# Patient Record
Sex: FEMALE | Race: WHITE | HISPANIC OR LATINO | Employment: FULL TIME | URBAN - METROPOLITAN AREA
[De-identification: names, ages, dates, MRNs, and addresses within clinical notes are randomized per-mention and may not be internally consistent; named-entity substitution may affect disease eponyms.]

---

## 2017-02-05 ENCOUNTER — OFFICE VISIT (OUTPATIENT)
Dept: URGENT CARE | Facility: PHYSICIAN GROUP | Age: 57
End: 2017-02-05
Payer: COMMERCIAL

## 2017-02-05 VITALS
DIASTOLIC BLOOD PRESSURE: 78 MMHG | TEMPERATURE: 99.1 F | BODY MASS INDEX: 31.41 KG/M2 | SYSTOLIC BLOOD PRESSURE: 122 MMHG | WEIGHT: 160 LBS | HEART RATE: 90 BPM | HEIGHT: 60 IN | OXYGEN SATURATION: 95 % | RESPIRATION RATE: 18 BRPM

## 2017-02-05 DIAGNOSIS — J20.9 ACUTE BRONCHITIS WITH BRONCHOSPASM: ICD-10-CM

## 2017-02-05 PROCEDURE — 99214 OFFICE O/P EST MOD 30 MIN: CPT | Performed by: NURSE PRACTITIONER

## 2017-02-05 RX ORDER — METHYLPREDNISOLONE 4 MG/1
4 TABLET ORAL DAILY
Qty: 1 KIT | Refills: 0 | Status: SHIPPED | OUTPATIENT
Start: 2017-02-05 | End: 2019-05-10

## 2017-02-05 RX ORDER — ALBUTEROL SULFATE 90 UG/1
2 AEROSOL, METERED RESPIRATORY (INHALATION) EVERY 6 HOURS PRN
Qty: 8.5 G | Refills: 0 | Status: SHIPPED | OUTPATIENT
Start: 2017-02-05 | End: 2021-08-05

## 2017-02-05 RX ORDER — AZITHROMYCIN 250 MG/1
TABLET, FILM COATED ORAL
Qty: 6 TAB | Refills: 0 | Status: SHIPPED | OUTPATIENT
Start: 2017-02-05 | End: 2019-05-10

## 2017-02-05 RX ORDER — CODEINE PHOSPHATE AND GUAIFENESIN 10; 100 MG/5ML; MG/5ML
5 SOLUTION ORAL EVERY 6 HOURS PRN
Qty: 120 ML | Refills: 0 | Status: SHIPPED | OUTPATIENT
Start: 2017-02-05 | End: 2019-05-10

## 2017-02-05 NOTE — MR AVS SNAPSHOT
Lauren Mandel   2017 2:35 PM   Office Visit   MRN: [id number]    Department:  [facility]   Dept Phone:  [phone]    Description:  Female : 1960   Provider:  STAN Mallory           Reason for Visit     Cough had cough, fever, sore [id number] ago, now just has cough, wheezing, SOB, chest congestion and slight fever remaining      Allergies as of 2017     Allergen Noted Reactions    Pcn [Penicillins] 2010       Sulfa Drugs 2010         You were diagnosed with     Acute bronchitis with bronchospasm   [[id number]]         Vital Signs     Blood Pressure Pulse Temperature Respirations Height Weight    122/78 mmHg 90 37.3 °C (99.1 °F) 18 1.524 m (5') 72.576 kg (160 lb)    Body Mass Index Oxygen Saturation                31.25 kg/m2 95%          Basic Information     Date Of Birth Sex Race Ethnicity Preferred Language    1960 Female White Non- English      Health Maintenance        Date Due Completion Dates    IMM DTaP/Tdap/Td Vaccine (1 - Tdap) 1977 ---    PAP SMEAR 1979 ---    COLONOSCOPY 2008 ---    MAMMOGRAM 2016, 2013, 2012, 2011, 2010, 2010, 3/20/2009, 3/20/2009, 2006, 2004    IMM INFLUENZA (1) 2016 ---            Current Immunizations     No immunizations on file.      Below and/or attached are the medications your provider expects you to take. Review all of your home medications and newly ordered medications with your provider and/or pharmacist. Follow medication instructions as directed by your provider and/or pharmacist. Please keep your medication list with you and share with your provider. Update the information when medications are discontinued, doses are changed, or new medications (including over-the-counter products) are added; and carry medication information at all times in the event of emergency situations     Allergies:  PCN - (reactions not documented)     SULFA DRUGS -  (reactions not documented)               Medications  Valid as of: February 05, 2017 -  2:58 PM    Generic Name Brand Name Tablet Size Instructions for use    Albuterol Sulfate (Aero Soln) albuterol 108 (90 BASE) MCG/ACT Inhale 2 Puffs by mouth every 6 hours as needed for Shortness of Breath.        Azithromycin (Tab) ZITHROMAX 250 MG Take two tabs on day one followed by one tab on days 2-5.        CycloSPORINE (Emulsion) RESTASIS 0.05 % Place 1 Drop in both eyes 2 times a day.        Estradiol (PATCH BIWEEKLY) Estradiol 0.025 MG/24HR Apply 1 Patch to skin as directed 2 times a week.        Guaifenesin-Codeine (Solution) ROBITUSSIN -10 mg/5mL Take 5 mL by mouth every 6 hours as needed.        MethylPREDNISolone (Tablet Therapy Pack) MEDROL DOSEPAK 4 MG Take 1 Tab by mouth every day.        Omega-3 Fatty Acids   Take  by mouth.        .                 Medicines prescribed today were sent to:     [id number] - [place] - [address]    [address] [place] [address]    Phone: [phone] Fax: [phone]    Open 24 Hours?: No      Medication refill instructions:       If your prescription bottle indicates you have medication refills left, it is not necessary to call your provider’s office. Please contact your pharmacy and they will refill your medication.    If your prescription bottle indicates you do not have any refills left, you may request refills at any time through one of the following ways: The online Agitar system (except Urgent Care), by calling your provider’s office, or by asking your pharmacy to contact your provider’s office with a refill request. Medication refills are processed only during regular business hours and may not be available until the next business day. Your provider may request additional information or to have a follow-up visit with you prior to refilling your medication.   *Please Note: Medication refills are assigned a new Rx number when refilled electronically. Your  pharmacy may indicate that no refills were authorized even though a new prescription for the same medication is available at the pharmacy. Please request the medicine by name with the pharmacy before contacting your provider for a refill.           Babyage Access Code: [email or web address]  Expires: 3/7/2017  2:33 PM    Babyage  A secure, online tool to manage your health information     [employer]’s Babyage® is a secure, online tool that connects you to your personalized health information from the privacy of your home -- day or night - making it very easy for you to manage your healthcare. Once the activation process is completed, you can even access your medical information using the Babyage juan luis, which is available for free in the Apple Juan Luis store or Google Play store.     Babyage provides the following levels of access (as shown below):   My Chart Features   [facility]  Specialists [facility]  Urgent  Care [facility]  Primary Care  Doctor   Email your healthcare team securely and privately 24/7 X X X    Manage appointments: schedule your next appointment; view details of past/upcoming appointments X      Request prescription refills. X      View recent personal medical records, including lab and immunizations X X X X   View health record, including health history, allergies, medications X X X X   Read reports about your outpatient visits, procedures, consult and ER notes X X X X   See your discharge summary, which is a recap of your hospital and/or ER visit that includes your diagnosis, lab results, and care plan. X X       How to register for Babyage:  1. Go to  [email or web address].  2. Click on the Sign Up Now box, which takes you to the New Member Sign Up page. You will need to provide the following information:  a. Enter your Babyage Access Code exactly as it appears at the top of this page. (You will not need to use this code after you’ve completed the sign-up process. If you do not  sign up before the expiration date, you must request a new code.)   b. Enter your date of birth.   c. Enter your home email address.   d. Click Submit, and follow the next screen’s instructions.  3. Create a LABOMAR ID. This will be your LABOMAR login ID and cannot be changed, so think of one that is secure and easy to remember.  4. Create a Booster.[email or web address] password. You can change your password at any time.  5. Enter your Password Reset Question and Answer. This can be used at a later time if you forget your password.   6. Enter your e-mail address. This allows you to receive e-mail notifications when new information is available in LABOMAR.  7. Click Sign Up. You can now view your health information.    For assistance activating your LABOMAR account, call [phone]

## 2017-02-05 NOTE — PROGRESS NOTES
Chief Complaint   Patient presents with   • Cough     had cough, fever, sore [id number] ago, now just has cough, wheezing, SOB, chest congestion and slight fever remaining       HISTORY OF PRESENT ILLNESS: Patient is a 56 y.o. female who presents today due to symptoms that started six days ago. Pt reports a productive cough without blood in sputum. Reports associated mild sore throat, nasal congestion, sinus pressure, fever, wheezing, and body aches. Denies chest pain, shortness of breath. Denies h/o asthma/copd/CAP. No immunocompromise. Has tried OTC cold medications without significant relief of symptoms. No recent ABX use. No other aggravating or alleviating factors.     There are no active problems to display for this patient.      Allergies:Pcn and Sulfa drugs    Current Outpatient Prescriptions Ordered in [facility]   Medication Sig Dispense Refill   • cyclosporin (RESTASIS) 0.05 % ophthalmic emulsion Place 1 Drop in both eyes 2 times a day.     • Omega-3 Fatty Acids (OMEGA 3 PO) Take  by mouth.     • Estradiol (VIVELLE-DOT) 0.025 MG/24HR PTTW Apply 1 Patch to skin as directed 2 times a week.       No current Epic-ordered facility-administered medications on file.       No past medical history on file.    Social History   Substance Use Topics   • Smoking status: Never Smoker    • Smokeless tobacco: Not on file   • Alcohol Use: No       No family status information on file.   No family history on file.    ROS:  Review of Systems   Constitutional: Positive for subjective fever, chills, fatigue. Negative for weight loss and malaise.  HENT: Positive for congestion and sore throat. Negative for ear pain, nosebleeds, and neck pain.    Eyes: Negative for vision changes.   Cardiovascular: Negative for chest pain, palpitations, orthopnea and leg swelling.   Respiratory: Positive for cough and sputum production, wheezing. Negative for shortness of breath.  Gastrointestinal: Negative for abdominal pain, nausea, vomiting or  diarrhea.   Skin: Negative for rash, diaphoresis.     Exam:  Blood pressure 122/78, pulse 90, temperature 37.3 °C (99.1 °F), resp. rate 18, height 1.524 m (5'), weight 72.576 kg (160 lb), SpO2 95 %.  General: well-nourished, well-developed female in NAD  Head: normocephalic, atraumatic  Eyes: PERRLA, EOM within normal limits, no conjunctival injection, no scleral icterus, visual fields and acuity grossly intact.  Ears: normal shape and symmetry, no tenderness, no discharge. External canals are without any significant edema or erythema. Tympanic membranes are without any inflammation, no effusion. Gross auditory acuity is intact.  Nose: symmetrical without tenderness, mild discharge, erythema present bilateral nares.  Mouth/Throat: reasonable hygiene, no exudates or tonsillar enlargement. Erythema present.   Neck: no masses, range of motion within normal limits, no tracheal deviation.  Lymph: mild cervical adenopathy. No supraclavicular adenopathy.   Neuro: alert and oriented. Cranial nerves 1-12 grossly intact.   Cardiovascular: regular rate and rhythm without murmurs, rubs, or gallops. No edema.   Pulmonary: no distress. Chest is symmetrical with respiration. Rhonchi RLL. Expiratory wheezing noted.   Musculoskeletal: appropriate muscle tone, gait is stable.  Skin: warm, dry, intact, no clubbing, no cyanosis.   Psych: appropriate mood, affect, judgement.         Assessment/Plan:  1. Acute bronchitis with bronchospasm  azithromycin (ZITHROMAX) 250 MG Tab    guaifenesin-codeine (ROBITUSSIN AC) Solution oral solution    albuterol 108 (90 BASE) MCG/ACT Aero Soln inhalation aerosol    MethylPREDNISolone (MEDROL DOSEPAK) 4 MG Tablet Therapy Pack           ZPack as directed. Medrol pack, take with food. Albuterol for wheezing, as needed. Robitussin for cough, sedative effects of medication discussed with patient.   Rest, increase fluids, hand and respiratory hygiene. May take OTC medications as directed for symptom relief.  Honey for cough.   Supportive care, differential diagnoses, and indications for immediate follow-up discussed with patient.   Pathogenesis of diagnosis discussed including typical length and natural progression.  Instructed to return to clinic or nearest emergency department for any change in condition, further concerns, or worsening of symptoms.  Patient states understanding of the plan of care and discharge instructions.  Instructed to make an appointment with their primary care provider in the next 3-7 days if not significantly improving and for further care.         Please note that this dictation was created using voice recognition software. I have made every reasonable attempt to correct obvious errors, but I expect that there are errors of grammar and possibly content that I did not discover before finalizing the note.      SHAHRAM Rizo.

## 2017-07-27 ENCOUNTER — HOSPITAL ENCOUNTER (OUTPATIENT)
Dept: LAB | Facility: MEDICAL CENTER | Age: 57
End: 2017-07-27
Attending: FAMILY MEDICINE
Payer: COMMERCIAL

## 2017-07-27 LAB
25(OH)D3 SERPL-MCNC: 11 NG/ML (ref 30–100)
ALBUMIN SERPL BCP-MCNC: 4.5 G/DL (ref 3.2–4.9)
ALBUMIN/GLOB SERPL: 1.2 G/DL
ALP SERPL-CCNC: 108 U/L (ref 30–99)
ALT SERPL-CCNC: 20 U/L (ref 2–50)
ANION GAP SERPL CALC-SCNC: 10 MMOL/L (ref 0–11.9)
AST SERPL-CCNC: 19 U/L (ref 12–45)
BASOPHILS # BLD AUTO: 0.8 % (ref 0–1.8)
BASOPHILS # BLD: 0.06 K/UL (ref 0–0.12)
BILIRUB SERPL-MCNC: 0.6 MG/DL (ref 0.1–1.5)
BUN SERPL-MCNC: 11 MG/DL (ref 8–22)
CALCIUM SERPL-MCNC: 10 MG/DL (ref 8.5–10.5)
CHLORIDE SERPL-SCNC: 103 MMOL/L (ref 96–112)
CHOLEST SERPL-MCNC: 187 MG/DL (ref 100–199)
CO2 SERPL-SCNC: 26 MMOL/L (ref 20–33)
CREAT SERPL-MCNC: 0.61 MG/DL (ref 0.5–1.4)
CREAT UR-MCNC: 22.9 MG/DL
EOSINOPHIL # BLD AUTO: 0.15 K/UL (ref 0–0.51)
EOSINOPHIL NFR BLD: 2 % (ref 0–6.9)
ERYTHROCYTE [DISTWIDTH] IN BLOOD BY AUTOMATED COUNT: 44.3 FL (ref 35.9–50)
EST. AVERAGE GLUCOSE BLD GHB EST-MCNC: 128 MG/DL
GFR SERPL CREATININE-BSD FRML MDRD: >60 ML/MIN/1.73 M 2
GLOBULIN SER CALC-MCNC: 3.9 G/DL (ref 1.9–3.5)
GLUCOSE SERPL-MCNC: 93 MG/DL (ref 65–99)
HBA1C MFR BLD: 6.1 % (ref 0–5.6)
HCT VFR BLD AUTO: 46.3 % (ref 37–47)
HDLC SERPL-MCNC: 57 MG/DL
HGB BLD-MCNC: 15.3 G/DL (ref 12–16)
IMM GRANULOCYTES # BLD AUTO: 0.02 K/UL (ref 0–0.11)
IMM GRANULOCYTES NFR BLD AUTO: 0.3 % (ref 0–0.9)
LDLC SERPL CALC-MCNC: 106 MG/DL
LYMPHOCYTES # BLD AUTO: 1.97 K/UL (ref 1–4.8)
LYMPHOCYTES NFR BLD: 26.9 % (ref 22–41)
MCH RBC QN AUTO: 29.5 PG (ref 27–33)
MCHC RBC AUTO-ENTMCNC: 33 G/DL (ref 33.6–35)
MCV RBC AUTO: 89.2 FL (ref 81.4–97.8)
MICROALBUMIN UR-MCNC: <0.7 MG/DL
MICROALBUMIN/CREAT UR: NORMAL MG/G (ref 0–30)
MONOCYTES # BLD AUTO: 0.46 K/UL (ref 0–0.85)
MONOCYTES NFR BLD AUTO: 6.3 % (ref 0–13.4)
NEUTROPHILS # BLD AUTO: 4.67 K/UL (ref 2–7.15)
NEUTROPHILS NFR BLD: 63.7 % (ref 44–72)
NRBC # BLD AUTO: 0 K/UL
NRBC BLD AUTO-RTO: 0 /100 WBC
PLATELET # BLD AUTO: 276 K/UL (ref 164–446)
PMV BLD AUTO: 10.9 FL (ref 9–12.9)
POTASSIUM SERPL-SCNC: 4.2 MMOL/L (ref 3.6–5.5)
PROT SERPL-MCNC: 8.4 G/DL (ref 6–8.2)
RBC # BLD AUTO: 5.19 M/UL (ref 4.2–5.4)
SODIUM SERPL-SCNC: 139 MMOL/L (ref 135–145)
TRIGL SERPL-MCNC: 120 MG/DL (ref 0–149)
TSH SERPL DL<=0.005 MIU/L-ACNC: 1.44 UIU/ML (ref 0.3–3.7)
WBC # BLD AUTO: 7.3 K/UL (ref 4.8–10.8)

## 2017-07-27 PROCEDURE — 82306 VITAMIN D 25 HYDROXY: CPT

## 2017-07-27 PROCEDURE — 84443 ASSAY THYROID STIM HORMONE: CPT

## 2017-07-27 PROCEDURE — 82043 UR ALBUMIN QUANTITATIVE: CPT

## 2017-07-27 PROCEDURE — 82570 ASSAY OF URINE CREATININE: CPT

## 2017-07-27 PROCEDURE — 83036 HEMOGLOBIN GLYCOSYLATED A1C: CPT

## 2017-07-27 PROCEDURE — 80061 LIPID PANEL: CPT

## 2017-07-27 PROCEDURE — 85025 COMPLETE CBC W/AUTO DIFF WBC: CPT

## 2017-07-27 PROCEDURE — 36415 COLL VENOUS BLD VENIPUNCTURE: CPT

## 2017-07-27 PROCEDURE — 80053 COMPREHEN METABOLIC PANEL: CPT

## 2017-08-18 ENCOUNTER — HOSPITAL ENCOUNTER (OUTPATIENT)
Dept: RADIOLOGY | Facility: MEDICAL CENTER | Age: 57
End: 2017-08-18
Attending: FAMILY MEDICINE
Payer: COMMERCIAL

## 2017-08-18 DIAGNOSIS — Z30.09 SCREENING AND EVALUATION FOR FEMALE STERILIZATION: ICD-10-CM

## 2017-08-18 PROCEDURE — G0202 SCR MAMMO BI INCL CAD: HCPCS

## 2018-05-08 ENCOUNTER — OFFICE VISIT (OUTPATIENT)
Dept: URGENT CARE | Facility: PHYSICIAN GROUP | Age: 58
End: 2018-05-08
Payer: COMMERCIAL

## 2018-05-08 VITALS
RESPIRATION RATE: 16 BRPM | BODY MASS INDEX: 31.8 KG/M2 | SYSTOLIC BLOOD PRESSURE: 122 MMHG | HEART RATE: 80 BPM | HEIGHT: 60 IN | WEIGHT: 162 LBS | DIASTOLIC BLOOD PRESSURE: 90 MMHG | OXYGEN SATURATION: 95 % | TEMPERATURE: 97.8 F

## 2018-05-08 DIAGNOSIS — J02.0 STREP THROAT: ICD-10-CM

## 2018-05-08 DIAGNOSIS — J30.9 ALLERGIC RHINITIS, UNSPECIFIED SEASONALITY, UNSPECIFIED TRIGGER: ICD-10-CM

## 2018-05-08 DIAGNOSIS — J02.9 SORE THROAT: ICD-10-CM

## 2018-05-08 LAB
INT CON NEG: NORMAL
INT CON POS: NORMAL
S PYO AG THROAT QL: POSITIVE

## 2018-05-08 PROCEDURE — 87880 STREP A ASSAY W/OPTIC: CPT | Performed by: EMERGENCY MEDICINE

## 2018-05-08 PROCEDURE — 99203 OFFICE O/P NEW LOW 30 MIN: CPT | Performed by: EMERGENCY MEDICINE

## 2018-05-08 RX ORDER — CEFDINIR 300 MG/1
300 CAPSULE ORAL 2 TIMES DAILY
Qty: 20 CAP | Refills: 0 | Status: SHIPPED | OUTPATIENT
Start: 2018-05-08 | End: 2018-05-18

## 2018-05-08 ASSESSMENT — ENCOUNTER SYMPTOMS
SWOLLEN GLANDS: 1
MYALGIAS: 0
TROUBLE SWALLOWING: 0
DIARRHEA: 1
SINUS PAIN: 0
BLOOD IN STOOL: 0
HEADACHES: 0
CHILLS: 1
COUGH: 0
VOMITING: 0
HOARSE VOICE: 0
ABDOMINAL PAIN: 0
NAUSEA: 0
SHORTNESS OF BREATH: 0

## 2018-05-08 ASSESSMENT — PAIN SCALES - GENERAL: PAINLEVEL: NO PAIN

## 2018-05-08 NOTE — PATIENT INSTRUCTIONS
Use an oral probiotic daily, such as Culturelle, Align, or yogurt to reduce gastrointestinal symptoms.  Strep Throat  Strep throat is a bacterial infection of the throat. Your health care provider may call the infection tonsillitis or pharyngitis, depending on whether there is swelling in the tonsils or at the back of the throat. Strep throat is most common during the cold months of the year in children who are 5-15 years of age, but it can happen during any season in people of any age. This infection is spread from person to person (contagious) through coughing, sneezing, or close contact.  What are the causes?  Strep throat is caused by the bacteria called Streptococcus pyogenes.  What increases the risk?  This condition is more likely to develop in:  · People who spend time in crowded places where the infection can spread easily.  · People who have close contact with someone who has strep throat.  What are the signs or symptoms?  Symptoms of this condition include:  · Fever or chills.  · Redness, swelling, or pain in the tonsils or throat.  · Pain or difficulty when swallowing.  · White or yellow spots on the tonsils or throat.  · Swollen, tender glands in the neck or under the jaw.  · Red rash all over the body (rare).  How is this diagnosed?  This condition is diagnosed by performing a rapid strep test or by taking a swab of your throat (throat culture test). Results from a rapid strep test are usually ready in a few minutes, but throat culture test results are available after one or two days.  How is this treated?  This condition is treated with antibiotic medicine.  Follow these instructions at home:  Medicines  · Take over-the-counter and prescription medicines only as told by your health care provider.  · Take your antibiotic as told by your health care provider. Do not stop taking the antibiotic even if you start to feel better.  · Have family members who also have a sore throat or fever tested for strep  throat. They may need antibiotics if they have the strep infection.  Eating and drinking  · Do not share food, drinking cups, or personal items that could cause the infection to spread to other people.  · If swallowing is difficult, try eating soft foods until your sore throat feels better.  · Drink enough fluid to keep your urine clear or pale yellow.  General instructions  · Gargle with a salt-water mixture 3-4 times per day or as needed. To make a salt-water mixture, completely dissolve ½-1 tsp of salt in 1 cup of warm water.  · Make sure that all household members wash their hands well.  · Get plenty of rest.  · Stay home from school or work until you have been taking antibiotics for 24 hours.  · Keep all follow-up visits as told by your health care provider. This is important.  Contact a health care provider if:  · The glands in your neck continue to get bigger.  · You develop a rash, cough, or earache.  · You cough up a thick liquid that is green, yellow-brown, or bloody.  · You have pain or discomfort that does not get better with medicine.  · Your problems seem to be getting worse rather than better.  · You have a fever.  Get help right away if:  · You have new symptoms, such as vomiting, severe headache, stiff or painful neck, chest pain, or shortness of breath.  · You have severe throat pain, drooling, or changes in your voice.  · You have swelling of the neck, or the skin on the neck becomes red and tender.  · You have signs of dehydration, such as fatigue, dry mouth, and decreased urination.  · You become increasingly sleepy, or you cannot wake up completely.  · Your joints become red or painful.  This information is not intended to replace advice given to you by your health care provider. Make sure you discuss any questions you have with your health care provider.  Document Released: 12/15/2001 Document Revised: 08/16/2017 Document Reviewed: 04/11/2016  Elsevier Interactive Patient Education © 2017  Elsevier Inc.

## 2018-05-08 NOTE — PROGRESS NOTES
Subjective:      Lauren Mandel is a 57 y.o. female who presents with Pharyngitis (x 1 day)            Pharyngitis    This is a new problem. The current episode started yesterday. The problem has been gradually worsening. Neither side of throat is experiencing more pain than the other. There has been no fever. The pain is moderate. Associated symptoms include congestion, diarrhea and swollen glands. Pertinent negatives include no abdominal pain, coughing, ear discharge, ear pain, headaches, hoarse voice, plugged ear sensation, shortness of breath, trouble swallowing or vomiting. She has had no exposure to strep. She has tried cool liquids for the symptoms. The treatment provided mild relief.   Notes recent exacerbation of AR.    Review of Systems   Constitutional: Positive for chills and malaise/fatigue.   HENT: Positive for congestion. Negative for ear discharge, ear pain, hearing loss, hoarse voice, sinus pain and trouble swallowing.    Respiratory: Negative for cough and shortness of breath.    Cardiovascular: Negative for chest pain.   Gastrointestinal: Positive for diarrhea. Negative for abdominal pain, blood in stool, nausea and vomiting.   Musculoskeletal: Negative for myalgias.   Skin: Negative for rash.   Neurological: Negative for headaches.   Endo/Heme/Allergies: Positive for environmental allergies.     PMH:  has no past medical history of Breast cancer (HCC).  MEDS:   Current Outpatient Prescriptions:   •  cefdinir (OMNICEF) 300 MG Cap, Take 1 Cap by mouth 2 times a day for 10 days., Disp: 20 Cap, Rfl: 0  •  azithromycin (ZITHROMAX) 250 MG Tab, Take two tabs on day one followed by one tab on days 2-5., Disp: 6 Tab, Rfl: 0  •  guaifenesin-codeine (ROBITUSSIN AC) Solution oral solution, Take 5 mL by mouth every 6 hours as needed., Disp: 120 mL, Rfl: 0  •  albuterol 108 (90 BASE) MCG/ACT Aero Soln inhalation aerosol, Inhale 2 Puffs by mouth every 6 hours as needed for Shortness of Breath., Disp: 8.5 g,  Rfl: 0  •  MethylPREDNISolone (MEDROL DOSEPAK) 4 MG Tablet Therapy Pack, Take 1 Tab by mouth every day., Disp: 1 Kit, Rfl: 0  •  cyclosporin (RESTASIS) 0.05 % ophthalmic emulsion, Place 1 Drop in both eyes 2 times a day., Disp: , Rfl:   •  Omega-3 Fatty Acids (OMEGA 3 PO), Take  by mouth., Disp: , Rfl:   •  Estradiol (VIVELLE-DOT) 0.025 MG/24HR PTTW, Apply 1 Patch to skin as directed 2 times a week., Disp: , Rfl:   ALLERGIES:   Allergies   Allergen Reactions   • Pcn [Penicillins]    • Sulfa Drugs      SURGHX:   Past Surgical History:   Procedure Laterality Date   • HYSTERECTOMY LAPAROSCOPY       SOCHX:  reports that she has never smoked. She does not have any smokeless tobacco history on file. She reports that she does not drink alcohol or use drugs.  FH: family history is not on file.       Objective:     /90   Pulse 80   Temp 36.6 °C (97.8 °F)   Resp 16   Ht 1.524 m (5')   Wt 73.5 kg (162 lb)   SpO2 95%   BMI 31.64 kg/m²      Physical Exam   Constitutional: She appears well-developed and well-nourished. She is cooperative.  Non-toxic appearance. No distress.   HENT:   Right Ear: Tympanic membrane and ear canal normal.   Left Ear: Tympanic membrane and ear canal normal.   Nose: Mucosal edema and rhinorrhea present.   Mouth/Throat: Uvula is midline and mucous membranes are normal. No trismus in the jaw. No uvula swelling. Posterior oropharyngeal erythema present. No oropharyngeal exudate, posterior oropharyngeal edema or tonsillar abscesses. No tonsillar exudate.   Eyes: Conjunctivae are normal.   Neck: Trachea normal. Neck supple.   Cardiovascular: Normal rate, regular rhythm and normal heart sounds.    Pulmonary/Chest: Effort normal and breath sounds normal.   Lymphadenopathy:     She has cervical adenopathy.        Right cervical: Superficial cervical adenopathy present. No posterior cervical adenopathy present.       Left cervical: Superficial cervical adenopathy present. No posterior cervical  adenopathy present.   Neurological: She is alert.   Skin: Skin is warm and dry.   Psychiatric: She has a normal mood and affect.          Childhood allergy to PCN; unknown reaction.     Assessment/Plan:     1. Strep throat  Recommended supportive care measures, including rest, increasing oral fluid intake and use of over-the-counter medications for relief of symptoms.  - cefdinir (OMNICEF) 300 MG Cap; Take 1 Cap by mouth 2 times a day for 10 days.  Dispense: 20 Cap; Refill: 0    2. Allergic rhinitis, unspecified seasonality, unspecified trigger  Recommended nasal saline irrigation daily, OTC inhaled nasal steroid daily, OTC nonsedating antihistamine when necessary as tolerated.    3. Sore throat  positive- POCT Rapid Strep A

## 2018-11-05 ENCOUNTER — HOSPITAL ENCOUNTER (OUTPATIENT)
Dept: LAB | Facility: MEDICAL CENTER | Age: 58
End: 2018-11-05
Attending: FAMILY MEDICINE
Payer: COMMERCIAL

## 2018-11-05 LAB
APPEARANCE UR: CLEAR
BASOPHILS # BLD AUTO: 0.6 % (ref 0–1.8)
BASOPHILS # BLD: 0.04 K/UL (ref 0–0.12)
BILIRUB UR QL STRIP.AUTO: NEGATIVE
COLOR UR: YELLOW
CREAT UR-MCNC: 75.9 MG/DL
EOSINOPHIL # BLD AUTO: 0.08 K/UL (ref 0–0.51)
EOSINOPHIL NFR BLD: 1.2 % (ref 0–6.9)
ERYTHROCYTE [DISTWIDTH] IN BLOOD BY AUTOMATED COUNT: 44.4 FL (ref 35.9–50)
ERYTHROCYTE [SEDIMENTATION RATE] IN BLOOD BY WESTERGREN METHOD: 20 MM/HOUR (ref 0–30)
EST. AVERAGE GLUCOSE BLD GHB EST-MCNC: 128 MG/DL
GLUCOSE UR STRIP.AUTO-MCNC: NEGATIVE MG/DL
HBA1C MFR BLD: 6.1 % (ref 0–5.6)
HCT VFR BLD AUTO: 43.8 % (ref 37–47)
HGB BLD-MCNC: 14.7 G/DL (ref 12–16)
IMM GRANULOCYTES # BLD AUTO: 0.02 K/UL (ref 0–0.11)
IMM GRANULOCYTES NFR BLD AUTO: 0.3 % (ref 0–0.9)
KETONES UR STRIP.AUTO-MCNC: NEGATIVE MG/DL
LEUKOCYTE ESTERASE UR QL STRIP.AUTO: NEGATIVE
LYMPHOCYTES # BLD AUTO: 1.66 K/UL (ref 1–4.8)
LYMPHOCYTES NFR BLD: 25.5 % (ref 22–41)
MCH RBC QN AUTO: 30.1 PG (ref 27–33)
MCHC RBC AUTO-ENTMCNC: 33.6 G/DL (ref 33.6–35)
MCV RBC AUTO: 89.6 FL (ref 81.4–97.8)
MICRO URNS: NORMAL
MICROALBUMIN UR-MCNC: <0.7 MG/DL
MICROALBUMIN/CREAT UR: NORMAL MG/G (ref 0–30)
MONOCYTES # BLD AUTO: 0.29 K/UL (ref 0–0.85)
MONOCYTES NFR BLD AUTO: 4.5 % (ref 0–13.4)
NEUTROPHILS # BLD AUTO: 4.42 K/UL (ref 2–7.15)
NEUTROPHILS NFR BLD: 67.9 % (ref 44–72)
NITRITE UR QL STRIP.AUTO: NEGATIVE
NRBC # BLD AUTO: 0 K/UL
NRBC BLD-RTO: 0 /100 WBC
PH UR STRIP.AUTO: 6.5 [PH]
PLATELET # BLD AUTO: 310 K/UL (ref 164–446)
PMV BLD AUTO: 10.8 FL (ref 9–12.9)
PROT UR QL STRIP: NEGATIVE MG/DL
RBC # BLD AUTO: 4.89 M/UL (ref 4.2–5.4)
RBC UR QL AUTO: NEGATIVE
SP GR UR STRIP.AUTO: 1.01
TSH SERPL DL<=0.005 MIU/L-ACNC: 1.33 UIU/ML (ref 0.38–5.33)
UROBILINOGEN UR STRIP.AUTO-MCNC: 0.2 MG/DL
WBC # BLD AUTO: 6.5 K/UL (ref 4.8–10.8)

## 2018-11-05 PROCEDURE — 84443 ASSAY THYROID STIM HORMONE: CPT

## 2018-11-05 PROCEDURE — 36415 COLL VENOUS BLD VENIPUNCTURE: CPT

## 2018-11-05 PROCEDURE — 82570 ASSAY OF URINE CREATININE: CPT

## 2018-11-05 PROCEDURE — 82043 UR ALBUMIN QUANTITATIVE: CPT

## 2018-11-05 PROCEDURE — 85025 COMPLETE CBC W/AUTO DIFF WBC: CPT

## 2018-11-05 PROCEDURE — 83036 HEMOGLOBIN GLYCOSYLATED A1C: CPT

## 2018-11-05 PROCEDURE — 85652 RBC SED RATE AUTOMATED: CPT

## 2018-11-05 PROCEDURE — 80053 COMPREHEN METABOLIC PANEL: CPT

## 2018-11-05 PROCEDURE — 81003 URINALYSIS AUTO W/O SCOPE: CPT

## 2018-11-06 LAB
ALBUMIN SERPL BCP-MCNC: 4.4 G/DL (ref 3.2–4.9)
ALBUMIN/GLOB SERPL: 1.4 G/DL
ALP SERPL-CCNC: 94 U/L (ref 30–99)
ALT SERPL-CCNC: 20 U/L (ref 2–50)
ANION GAP SERPL CALC-SCNC: 8 MMOL/L (ref 0–11.9)
AST SERPL-CCNC: 19 U/L (ref 12–45)
BILIRUB SERPL-MCNC: 0.4 MG/DL (ref 0.1–1.5)
BUN SERPL-MCNC: 13 MG/DL (ref 8–22)
CALCIUM SERPL-MCNC: 9.8 MG/DL (ref 8.5–10.5)
CHLORIDE SERPL-SCNC: 105 MMOL/L (ref 96–112)
CO2 SERPL-SCNC: 27 MMOL/L (ref 20–33)
CREAT SERPL-MCNC: 0.52 MG/DL (ref 0.5–1.4)
FASTING STATUS PATIENT QL REPORTED: NORMAL
GLOBULIN SER CALC-MCNC: 3.2 G/DL (ref 1.9–3.5)
GLUCOSE SERPL-MCNC: 89 MG/DL (ref 65–99)
POTASSIUM SERPL-SCNC: 3.7 MMOL/L (ref 3.6–5.5)
PROT SERPL-MCNC: 7.6 G/DL (ref 6–8.2)
SODIUM SERPL-SCNC: 140 MMOL/L (ref 135–145)

## 2018-12-17 ENCOUNTER — OFFICE VISIT (OUTPATIENT)
Dept: URGENT CARE | Facility: PHYSICIAN GROUP | Age: 58
End: 2018-12-17
Payer: COMMERCIAL

## 2018-12-17 VITALS
DIASTOLIC BLOOD PRESSURE: 72 MMHG | SYSTOLIC BLOOD PRESSURE: 130 MMHG | WEIGHT: 157.2 LBS | BODY MASS INDEX: 30.86 KG/M2 | TEMPERATURE: 97.9 F | HEART RATE: 92 BPM | OXYGEN SATURATION: 94 % | HEIGHT: 60 IN

## 2018-12-17 DIAGNOSIS — J02.9 VIRAL PHARYNGITIS: ICD-10-CM

## 2018-12-17 LAB
FLUAV+FLUBV AG SPEC QL IA: NEGATIVE
INT CON NEG: NORMAL
INT CON NEG: NORMAL
INT CON POS: NORMAL
INT CON POS: NORMAL
S PYO AG THROAT QL: NEGATIVE

## 2018-12-17 PROCEDURE — 99214 OFFICE O/P EST MOD 30 MIN: CPT | Performed by: FAMILY MEDICINE

## 2018-12-17 PROCEDURE — 87804 INFLUENZA ASSAY W/OPTIC: CPT | Performed by: FAMILY MEDICINE

## 2018-12-17 PROCEDURE — 87880 STREP A ASSAY W/OPTIC: CPT | Performed by: FAMILY MEDICINE

## 2018-12-17 RX ORDER — BENZONATATE 200 MG/1
200 CAPSULE ORAL 3 TIMES DAILY PRN
Qty: 45 CAP | Refills: 0 | Status: SHIPPED | OUTPATIENT
Start: 2018-12-17 | End: 2019-05-10

## 2018-12-17 RX ORDER — IBUPROFEN 800 MG/1
800 TABLET ORAL EVERY 8 HOURS PRN
Qty: 30 TAB | Refills: 0 | Status: SHIPPED | OUTPATIENT
Start: 2018-12-17

## 2018-12-17 NOTE — PROGRESS NOTES
Subjective:     CC:  presents with Pharyngitis            Pharyngitis   This is a new problem. The current episode started in the past 2 days. The problem has been unchanged. There has been no fever. The pain is moderate. Associated symptoms includes:  Dry cough, nasal congestion.        Pertinent negatives include no abdominal pain,  diarrhea, headaches, shortness of breath or vomiting. no exposure to strep or mono.   has tried acetaminophen for the symptoms. The treatment provided mild relief.     Social History   Substance Use Topics   • Smoking status: Never Smoker   • Smokeless tobacco: Never Used   • Alcohol use No      Past medical history was unremarkable and not pertinent to current issue      Review of Systems   Constitutional: Positive for malaise/fatigue. Negative for fever and weight loss.   HENT: Positive for nasal  congestion.    Respiratory: Negative for  sputum production and shortness of breath.    Cardiovascular: Negative for chest pain.   Gastrointestinal: Negative for nausea, vomiting, abdominal pain and diarrhea.   Genitourinary: Negative.    Neurological: Negative for dizziness and headaches.   All other systems reviewed and are negative.         Objective:   Blood pressure 130/72, pulse 92, temperature 36.6 °C (97.9 °F), temperature source Temporal, height 1.524 m (5'), weight 71.3 kg (157 lb 3.2 oz), SpO2 94 %.        Physical Exam   Constitutional:   oriented to person, place, and time.  appears well-developed and well-nourished. No distress.   HENT:   Head: Normocephalic and atraumatic.   Right Ear: External ear normal.   Left Ear: External ear normal.   Nose: Mucosal edema present. Right sinus exhibits no maxillary sinus tenderness and no frontal sinus tenderness. Left sinus exhibits no maxillary sinus tenderness and no frontal sinus tenderness.   Mouth/Throat: no posterior oropharyngeal exudate.   There is posterior oropharyngeal erythema present. No posterior oropharyngeal edema.    Tonsils not visualized     Eyes: Conjunctivae and EOM are normal. Pupils are equal, round, and reactive to light. Right eye exhibits no discharge. Left eye exhibits no discharge. No scleral icterus.   Neck: Normal range of motion. Neck supple. No JVD present. No tracheal deviation present. No thyromegaly present.   Cardiovascular: Normal rate, regular rhythm, normal heart sounds and intact distal pulses.  Exam reveals no friction rub.    No murmur heard.  Pulmonary/Chest: Effort normal and breath sounds normal. No respiratory distress.   no wheezes.   no rales.    Musculoskeletal:  exhibits no edema.   Lymphadenopathy: there is no cervical LAD  Neurological:   alert and oriented to person, place, and time.   Skin: Skin is warm and dry. No erythema.   Psychiatric:   normal mood and affect.   Nursing note and vitals reviewed.             Assessment/Plan:     1. Viral pharyngitis  Rapid strep, influenza negative.        - benzonatate (TESSALON) 200 MG capsule; Take 1 Cap by mouth 3 times a day as needed for Cough.  Dispense: 45 Cap; Refill: 0  - ibuprofen (MOTRIN) 800 MG Tab; Take 1 Tab by mouth every 8 hours as needed.  Dispense: 30 Tab; Refill: 0

## 2019-04-12 ENCOUNTER — HOSPITAL ENCOUNTER (OUTPATIENT)
Facility: MEDICAL CENTER | Age: 59
End: 2019-04-12
Attending: FAMILY MEDICINE
Payer: COMMERCIAL

## 2019-04-12 ENCOUNTER — OFFICE VISIT (OUTPATIENT)
Dept: URGENT CARE | Facility: PHYSICIAN GROUP | Age: 59
End: 2019-04-12
Payer: COMMERCIAL

## 2019-04-12 VITALS
RESPIRATION RATE: 12 BRPM | HEART RATE: 69 BPM | BODY MASS INDEX: 30.43 KG/M2 | OXYGEN SATURATION: 98 % | DIASTOLIC BLOOD PRESSURE: 80 MMHG | TEMPERATURE: 97.5 F | WEIGHT: 155 LBS | SYSTOLIC BLOOD PRESSURE: 122 MMHG | HEIGHT: 60 IN

## 2019-04-12 DIAGNOSIS — R30.0 DYSURIA: ICD-10-CM

## 2019-04-12 LAB
APPEARANCE UR: CLEAR
BILIRUB UR STRIP-MCNC: NEGATIVE MG/DL
COLOR UR AUTO: YELLOW
GLUCOSE UR STRIP.AUTO-MCNC: NEGATIVE MG/DL
KETONES UR STRIP.AUTO-MCNC: NEGATIVE MG/DL
LEUKOCYTE ESTERASE UR QL STRIP.AUTO: NEGATIVE
NITRITE UR QL STRIP.AUTO: NEGATIVE
PH UR STRIP.AUTO: 7 [PH] (ref 5–8)
PROT UR QL STRIP: NEGATIVE MG/DL
RBC UR QL AUTO: NEGATIVE
SP GR UR STRIP.AUTO: 1.01
UROBILINOGEN UR STRIP-MCNC: 1 MG/DL

## 2019-04-12 PROCEDURE — 87186 SC STD MICRODIL/AGAR DIL: CPT

## 2019-04-12 PROCEDURE — 99213 OFFICE O/P EST LOW 20 MIN: CPT | Performed by: FAMILY MEDICINE

## 2019-04-12 PROCEDURE — 87077 CULTURE AEROBIC IDENTIFY: CPT

## 2019-04-12 PROCEDURE — 87086 URINE CULTURE/COLONY COUNT: CPT

## 2019-04-12 PROCEDURE — 81002 URINALYSIS NONAUTO W/O SCOPE: CPT | Performed by: FAMILY MEDICINE

## 2019-04-12 NOTE — PROGRESS NOTES
Subjective:   Lauren Santiago a 58 y.o. female who presents for Urinary Frequency (lower back pain /headache )    Urinary Frequency   This is a new problem. The current episode started yesterday. The problem occurs constantly. The problem has been rapidly worsening. Pertinent negatives include no chills or fever.     Review of Systems   Constitutional: Negative for chills and fever.   Genitourinary: Positive for dysuria and frequency.     Allergies   Allergen Reactions   • Pcn [Penicillins]    • Sulfa Drugs       Objective:   /80   Pulse 69   Temp 36.4 °C (97.5 °F) (Temporal)   Resp 12   Ht 1.524 m (5')   Wt 70.3 kg (155 lb)   SpO2 98%   BMI 30.27 kg/m²   Physical Exam   Constitutional: She is oriented to person, place, and time. She appears well-developed and well-nourished. No distress.   HENT:   Head: Normocephalic and atraumatic.   Eyes: Pupils are equal, round, and reactive to light. Conjunctivae and EOM are normal.   Cardiovascular: Normal rate and regular rhythm.    No murmur heard.  Pulmonary/Chest: Effort normal and breath sounds normal. No respiratory distress.   Abdominal: Soft. Bowel sounds are normal. She exhibits no distension. There is no tenderness. There is no CVA tenderness.   Neurological: She is alert and oriented to person, place, and time. She has normal reflexes. No sensory deficit.   Skin: Skin is warm and dry.   Psychiatric: She has a normal mood and affect.     Assessment/Plan:   Assessment    1. Dysuria  - POCT Urinalysis  - URINE CULTURE(NEW); Future  - cefdinir (OMNICEF) 300 MG Cap; Take 1 Cap by mouth 2 times a day for 5 days.  Dispense: 10 Cap; Refill: 0      Differential diagnosis, natural history, supportive care, and indications for immediate follow-up discussed.  Return if symptoms worsen or fail to improve.

## 2019-04-12 NOTE — PATIENT INSTRUCTIONS
Dysuria  Introduction  Dysuria is pain or discomfort while urinating. The pain or discomfort may be felt in the tube that carries urine out of the bladder (urethra) or in the surrounding tissue of the genitals. The pain may also be felt in the groin area, lower abdomen, and lower back. You may have to urinate frequently or have the sudden feeling that you have to urinate (urgency). Dysuria can affect both men and women, but is more common in women.  Dysuria can be caused by many different things, including:  · Urinary tract infection in women.  · Infection of the kidney or bladder.  · Kidney stones or bladder stones.  · Certain sexually transmitted infections (STIs), such as chlamydia.  · Dehydration.  · Inflammation of the vagina.  · Use of certain medicines.  · Use of certain soaps or scented products that cause irritation.  Follow these instructions at home:  Watch your dysuria for any changes. The following actions may help to reduce any discomfort you are feeling:  · Drink enough fluid to keep your urine clear or pale yellow.  · Empty your bladder often. Avoid holding urine for long periods of time.  · After a bowel movement or urination, women should cleanse from front to back, using each tissue only once.  · Empty your bladder after sexual intercourse.  · Take medicines only as directed by your health care provider.  · If you were prescribed an antibiotic medicine, finish it all even if you start to feel better.  · Avoid caffeine, tea, and alcohol. They can irritate the bladder and make dysuria worse. In men, alcohol may irritate the prostate.  · Keep all follow-up visits as directed by your health care provider. This is important.  · If you had any tests done to find the cause of dysuria, it is your responsibility to obtain your test results. Ask the lab or department performing the test when and how you will get your results. Talk with your health care provider if you have any questions about your  results.  Contact a health care provider if:  · You develop pain in your back or sides.  · You have a fever.  · You have nausea or vomiting.  · You have blood in your urine.  · You are not urinating as often as you usually do.  Get help right away if:  · You pain is severe and not relieved with medicines.  · You are unable to hold down any fluids.  · You or someone else notices a change in your mental function.  · You have a rapid heartbeat at rest.  · You have shaking or chills.  · You feel extremely weak.  This information is not intended to replace advice given to you by your health care provider. Make sure you discuss any questions you have with your health care provider.  Document Released: 09/15/2005 Document Revised: 05/25/2017 Document Reviewed: 08/13/2015  © 2017 [employer]

## 2019-04-13 DIAGNOSIS — R30.0 DYSURIA: ICD-10-CM

## 2019-04-15 LAB
BACTERIA UR CULT: ABNORMAL
BACTERIA UR CULT: ABNORMAL
SIGNIFICANT IND 70042: ABNORMAL
SITE SITE: ABNORMAL
SOURCE SOURCE: ABNORMAL

## 2019-04-16 RX ORDER — CEFDINIR 300 MG/1
300 CAPSULE ORAL 2 TIMES DAILY
Qty: 10 CAP | Refills: 0 | Status: SHIPPED | OUTPATIENT
Start: 2019-04-16 | End: 2019-04-21

## 2019-04-16 ASSESSMENT — ENCOUNTER SYMPTOMS
CHILLS: 0
FEVER: 0

## 2019-05-10 ENCOUNTER — OFFICE VISIT (OUTPATIENT)
Dept: URGENT CARE | Facility: PHYSICIAN GROUP | Age: 59
End: 2019-05-10
Payer: COMMERCIAL

## 2019-05-10 ENCOUNTER — HOSPITAL ENCOUNTER (OUTPATIENT)
Facility: MEDICAL CENTER | Age: 59
End: 2019-05-10
Attending: PHYSICIAN ASSISTANT
Payer: COMMERCIAL

## 2019-05-10 VITALS
DIASTOLIC BLOOD PRESSURE: 76 MMHG | SYSTOLIC BLOOD PRESSURE: 128 MMHG | RESPIRATION RATE: 16 BRPM | OXYGEN SATURATION: 98 % | HEART RATE: 78 BPM | TEMPERATURE: 99.2 F | BODY MASS INDEX: 30.43 KG/M2 | WEIGHT: 155 LBS | HEIGHT: 60 IN

## 2019-05-10 DIAGNOSIS — K52.9 GASTROENTERITIS: ICD-10-CM

## 2019-05-10 DIAGNOSIS — R11.0 NAUSEA: ICD-10-CM

## 2019-05-10 DIAGNOSIS — K52.9 GASTROENTERITIS: Primary | ICD-10-CM

## 2019-05-10 LAB
APPEARANCE UR: NORMAL
BILIRUB UR STRIP-MCNC: NORMAL MG/DL
COLOR UR AUTO: NORMAL
GLUCOSE UR STRIP.AUTO-MCNC: NORMAL MG/DL
KETONES UR STRIP.AUTO-MCNC: NORMAL MG/DL
LEUKOCYTE ESTERASE UR QL STRIP.AUTO: NORMAL
NITRITE UR QL STRIP.AUTO: NORMAL
PH UR STRIP.AUTO: 5.5 [PH] (ref 5–8)
PROT UR QL STRIP: NORMAL MG/DL
RBC UR QL AUTO: NORMAL
SP GR UR STRIP.AUTO: 1.03
UROBILINOGEN UR STRIP-MCNC: 0.2 MG/DL

## 2019-05-10 PROCEDURE — 81002 URINALYSIS NONAUTO W/O SCOPE: CPT | Performed by: PHYSICIAN ASSISTANT

## 2019-05-10 PROCEDURE — 87086 URINE CULTURE/COLONY COUNT: CPT

## 2019-05-10 PROCEDURE — 99214 OFFICE O/P EST MOD 30 MIN: CPT | Performed by: PHYSICIAN ASSISTANT

## 2019-05-10 RX ORDER — NITROFURANTOIN 25; 75 MG/1; MG/1
CAPSULE ORAL
Refills: 0 | COMMUNITY
Start: 2019-02-22 | End: 2020-11-07

## 2019-05-10 RX ORDER — ONDANSETRON 4 MG/1
4 TABLET, FILM COATED ORAL EVERY 4 HOURS PRN
Qty: 20 TAB | Refills: 0 | Status: SHIPPED | OUTPATIENT
Start: 2019-05-10 | End: 2019-05-15

## 2019-05-10 RX ORDER — LIFITEGRAST 50 MG/ML
SOLUTION/ DROPS OPHTHALMIC
Refills: 3 | COMMUNITY
Start: 2019-05-02

## 2019-05-10 RX ORDER — CYCLOSPORINE 0.5 MG/ML
EMULSION OPHTHALMIC
COMMUNITY
Start: 2017-07-05 | End: 2020-11-07

## 2019-05-10 ASSESSMENT — ENCOUNTER SYMPTOMS
CHILLS: 0
CONSTIPATION: 0
SORE THROAT: 0
SHORTNESS OF BREATH: 0
FATIGUE: 1
VOMITING: 0
FEVER: 0
NAUSEA: 1
ABDOMINAL PAIN: 1
FLANK PAIN: 0
CHANGE IN BOWEL HABIT: 1
DIARRHEA: 0
COUGH: 0

## 2019-05-10 NOTE — PATIENT INSTRUCTIONS
Clear liquid diet for 12 hours then transition to solids       Viral Gastroenteritis, Adult  Introduction  Viral gastroenteritis is also known as the stomach flu. This condition is caused by certain germs (viruses). These germs can be passed from person to person very easily (are very contagious). This condition can cause sudden watery poop (diarrhea), fever, and throwing up (vomiting).  Having watery poop and throwing up can make you feel weak and cause you to get dehydrated. Dehydration can make you tired and thirsty, make you have a dry mouth, and make it so you pee (urinate) less often. Older adults and people with other diseases or a weak defense system (immune system) are at higher risk for dehydration. It is important to replace the fluids that you lose from having watery poop and throwing up.  Follow these instructions at home:  Follow instructions from your doctor about how to care for yourself at home.  Eating and drinking  Follow these instructions as told by your doctor:  · Take an oral rehydration solution (ORS). This is a drink that is sold at pharmacies and stores.  · Drink clear fluids in small amounts as you are able, such as:  ¨ Water.  ¨ Ice chips.  ¨ Diluted fruit juice.  ¨ Low-calorie sports drinks.  · Eat bland, easy-to-digest foods in small amounts as you are able, such as:  ¨ Bananas.  ¨ Applesauce.  ¨ Rice.  ¨ Low-fat (lean) meats.  ¨ Toast.  ¨ Crackers.  · Avoid fluids that have a lot of sugar or caffeine in them.  · Avoid alcohol.  · Avoid spicy or fatty foods.  General instructions  · Drink enough fluid to keep your pee (urine) clear or pale yellow.  · Wash your hands often. If you cannot use soap and water, use hand .  · Make sure that all people in your home wash their hands well and often.  · Rest at home while you get better.  · Take over-the-counter and prescription medicines only as told by your doctor.  · Watch your condition for any changes.  · Take a warm bath to help  with any burning or pain from having watery poop.  · Keep all follow-up visits as told by your doctor. This is important.  Contact a doctor if:  · You cannot keep fluids down.  · Your symptoms get worse.  · You have new symptoms.  · You feel light-headed or dizzy.  · You have muscle cramps.  Get help right away if:  · You have chest pain.  · You feel very weak or you pass out (faint).  · You see blood in your throw-up.  · Your throw-up looks like coffee grounds.  · You have bloody or black poop (stools) or poop that look like tar.  · You have a very bad headache, a stiff neck, or both.  · You have a rash.  · You have very bad pain, cramping, or bloating in your belly (abdomen).  · You have trouble breathing.  · You are breathing very quickly.  · Your heart is beating very quickly.  · Your skin feels cold and clammy.  · You feel confused.  · You have pain when you pee.  · You have signs of dehydration, such as:  ¨ Dark pee, hardly any pee, or no pee.  ¨ Cracked lips.  ¨ Dry mouth.  ¨ Sunken eyes.  ¨ Sleepiness.  ¨ Weakness.  This information is not intended to replace advice given to you by your health care provider. Make sure you discuss any questions you have with your health care provider.  Document Released: 06/05/2009 Document Revised: 07/07/2017 Document Reviewed: 08/23/2016  © 2017 [employer]      Food Choices to Help Relieve Diarrhea, Adult  When you have diarrhea, the foods you eat and your eating habits are very important. Choosing the right foods and drinks can help relieve diarrhea. Also, because diarrhea can last up to 7 days, you need to replace lost fluids and electrolytes (such as sodium, potassium, and chloride) in order to help prevent dehydration.   WHAT GENERAL GUIDELINES DO I NEED TO FOLLOW?  · Slowly drink 1 cup (8 oz) of fluid for each episode of diarrhea. If you are getting enough fluid, your urine will be clear or pale yellow.  · Eat starchy foods. Some good choices include white rice, white  toast, pasta, low-fiber cereal, baked potatoes (without the skin), saltine crackers, and bagels.  · Avoid large servings of any cooked vegetables.  · Limit fruit to two servings per day. A serving is ½ cup or 1 small piece.  · Choose foods with less than 2 g of fiber per serving.  · Limit fats to less than 8 tsp (38 g) per day.  · Avoid fried foods.  · Eat foods that have probiotics in them. Probiotics can be found in certain dairy products.  · Avoid foods and beverages that may increase the speed at which food moves through the stomach and intestines (gastrointestinal tract). Things to avoid include:  ¨ High-fiber foods, such as dried fruit, raw fruits and vegetables, nuts, seeds, and whole grain foods.  ¨ Spicy foods and high-fat foods.  ¨ Foods and beverages sweetened with high-fructose corn syrup, honey, or sugar alcohols such as xylitol, sorbitol, and mannitol.  WHAT FOODS ARE RECOMMENDED?  Grains  White rice. White, Arabic, or belkis breads (fresh or toasted), including plain rolls, buns, or bagels. White pasta. Saltine, soda, or corazon crackers. Pretzels. Low-fiber cereal. Cooked cereals made with water (such as cornmeal, farina, or cream cereals). Plain muffins. Matzo. Taylor toast. Zwieback.   Vegetables  Potatoes (without the skin). Strained tomato and vegetable juices. Most well-cooked and canned vegetables without seeds. Tender lettuce.  Fruits  Cooked or canned applesauce, apricots, cherries, fruit cocktail, grapefruit, peaches, pears, or plums. Fresh bananas, apples without skin, cherries, grapes, cantaloupe, grapefruit, peaches, oranges, or plums.   Meat and Other Protein Products  Baked or boiled chicken. Eggs. Tofu. Fish. Seafood. Smooth peanut butter. Ground or well-cooked tender beef, ham, veal, lamb, pork, or poultry.   Dairy  Plain yogurt, kefir, and unsweetened liquid yogurt. Lactose-free milk, buttermilk, or soy milk. Plain hard cheese.  Beverages  Sport drinks. Clear broths. Diluted fruit juices  (except prune). Regular, caffeine-free sodas such as ginger ale. Water. Decaffeinated teas. Oral rehydration solutions. Sugar-free beverages not sweetened with sugar alcohols.  Other  Bouillon, broth, or soups made from recommended foods.   The items listed above may not be a complete list of recommended foods or beverages. Contact your dietitian for more options.  WHAT FOODS ARE NOT RECOMMENDED?  Grains  Whole grain, whole wheat, bran, or rye breads, rolls, pastas, crackers, and cereals. Wild or brown rice. Cereals that contain more than 2 g of fiber per serving. Corn tortillas or taco shells. Cooked or dry oatmeal. Granola. Popcorn.  Vegetables  Raw vegetables. Cabbage, broccoli, [place] sprouts, artichokes, baked beans, beet greens, corn, kale, legumes, peas, sweet potatoes, and yams. Potato skins. Cooked spinach and cabbage.  Fruits  Dried fruit, including raisins and dates. Raw fruits. Stewed or dried prunes. Fresh apples with skin, apricots, mangoes, pears, raspberries, and strawberries.   Meat and Other Protein Products  Watts peanut butter. Nuts and seeds. Beans and lentils. Wiggins.   Dairy  High-fat cheeses. Milk, chocolate milk, and beverages made with milk, such as milk shakes. Cream. Ice cream.  Sweets and Desserts  Sweet rolls, doughnuts, and sweet breads. Pancakes and waffles.  Fats and Oils  Butter. Cream sauces. Margarine. Salad oils. Plain salad dressings. Olives. Avocados.   Beverages  Caffeinated beverages (such as coffee, tea, soda, or energy drinks). Alcoholic beverages. Fruit juices with pulp. Prune juice. Soft drinks sweetened with high-fructose corn syrup or sugar alcohols.  Other  Coconut. Hot sauce. Chili powder. Mayonnaise. Gravy. Cream-based or milk-based soups.   The items listed above may not be a complete list of foods and beverages to avoid. Contact your dietitian for more information.  WHAT SHOULD I DO IF I BECOME DEHYDRATED?  Diarrhea can sometimes lead to dehydration. Signs of  dehydration include dark urine and dry mouth and skin. If you think you are dehydrated, you should rehydrate with an oral rehydration solution. These solutions can be purchased at pharmacies, retail stores, or online.   Drink ½-1 cup (120-240 mL) of oral rehydration solution each time you have an episode of diarrhea. If drinking this amount makes your diarrhea worse, try drinking smaller amounts more often. For example, drink 1-3 tsp (5-15 mL) every 5-10 minutes.   A general rule for staying hydrated is to drink 1½-2 L of fluid per day. Talk to your health care provider about the specific amount you should be drinking each day. Drink enough fluids to keep your urine clear or pale yellow.     This information is not intended to replace advice given to you by your health care provider. Make sure you discuss any questions you have with your health care provider.     Document Released: 03/09/2005 Document Revised: 01/08/2016 Document Reviewed: 11/10/2014  [employer] Interactive Patient Education ©2016 [employer]

## 2019-05-10 NOTE — PROGRESS NOTES
Subjective:   Lauren Mandel is a 58 y.o. female who presents for Diarrhea (began yesterday but since sunday stomach ache, nausea, bloating )        Nausea   This is a new problem. Episode onset: 5 days. Associated symptoms include abdominal pain, a change in bowel habit (diarrhea yesterday 5-6 episode ), fatigue and nausea. Pertinent negatives include no chills, congestion, coughing, fever, sore throat or vomiting. Treatments tried: OTC probiotic  The treatment provided moderate relief.     The pt was recently tx for UTI on 4/12/19. She completed for cefdinir BID x 5 days.     Hx of gluten sensitivity, she was attending a bbq on Sunday and admits to eating corn and other food she does not routinely eat.     Review of Systems   Constitutional: Positive for fatigue and malaise/fatigue. Negative for chills and fever.   HENT: Negative for congestion and sore throat.    Respiratory: Negative for cough and shortness of breath.    Gastrointestinal: Positive for abdominal pain, change in bowel habit (diarrhea yesterday 5-6 episode ) and nausea. Negative for constipation, diarrhea and vomiting.   Genitourinary: Positive for frequency. Negative for dysuria, flank pain, hematuria and urgency.   All other systems reviewed and are negative.      PMH:  has no past medical history of Breast cancer (HCC).  MEDS:   Current Outpatient Prescriptions:   •  Cholecalciferol (D3-50) 00270 UNIT Cap, D3-50 49050 UNIT CAPS, Disp: , Rfl:   •  cyclosporin (RESTASIS MULTIDOSE) 0.05 % ophthalmic emulsion, RESTASIS MULTIDOSE 0.05 % EMUL, Disp: , Rfl:   •  XIIDRA 5 % Solution, INSTILL 1 DROP IN OU BID, Disp: , Rfl: 3  •  ondansetron (ZOFRAN) 4 MG Tab tablet, Take 1 Tab by mouth every four hours as needed for Nausea/Vomiting for up to 5 days., Disp: 20 Tab, Rfl: 0  •  Omega-3 Fatty Acids (OMEGA 3 PO), Take  by mouth., Disp: , Rfl:   •  nitrofurantoin monohyd macro (MACROBID) 100 MG Cap, TK 1 C PO BID FOR 5 DAYS FOR UTI, Disp: , Rfl: 0  •   ibuprofen (MOTRIN) 800 MG Tab, Take 1 Tab by mouth every 8 hours as needed., Disp: 30 Tab, Rfl: 0  •  albuterol 108 (90 BASE) MCG/ACT Aero Soln inhalation aerosol, Inhale 2 Puffs by mouth every 6 hours as needed for Shortness of Breath., Disp: 8.5 g, Rfl: 0  •  cyclosporin (RESTASIS) 0.05 % ophthalmic emulsion, Place 1 Drop in both eyes 2 times a day., Disp: , Rfl:   •  Estradiol (VIVELLE-DOT) 0.025 MG/24HR PTTW, Apply 1 Patch to skin as directed 2 times a week., Disp: , Rfl:   ALLERGIES:   Allergies   Allergen Reactions   • Pcn [Penicillins]    • Sulfa Drugs      SURGHX:   Past Surgical History:   Procedure Laterality Date   • HYSTERECTOMY LAPAROSCOPY       SOCHX:  reports that she has never smoked. She has never used smokeless tobacco. She reports that she does not drink alcohol or use drugs.  History reviewed. No pertinent family history.     Objective:   /76 (BP Location: Right arm, Patient Position: Sitting, BP Cuff Size: Adult)   Pulse 78   Temp 37.3 °C (99.2 °F) (Temporal)   Resp 16   Ht 1.524 m (5')   Wt 70.3 kg (155 lb)   SpO2 98%   BMI 30.27 kg/m²     Physical Exam   Constitutional: She is oriented to person, place, and time. She appears well-developed and well-nourished. No distress.   HENT:   Head: Normocephalic and atraumatic.   Nose: Nose normal.   Eyes: Pupils are equal, round, and reactive to light. Conjunctivae are normal.   Neck: Normal range of motion. Neck supple. No tracheal deviation present.   Cardiovascular: Normal rate and regular rhythm.    Pulmonary/Chest: Effort normal and breath sounds normal.   Abdominal: Soft. Normal appearance and bowel sounds are normal. She exhibits no distension. There is no tenderness. There is no rigidity, no rebound, no guarding, no CVA tenderness, no tenderness at McBurney's point and negative Wood's sign.   Neurological: She is alert and oriented to person, place, and time.   Skin: Skin is warm and dry. Capillary refill takes less than 2  seconds.   Psychiatric: She has a normal mood and affect. Her behavior is normal.   Vitals reviewed.    Lab Results   Component Value Date/Time    POCCOLOR Dark Yellow 05/10/2019 02:04 PM    POCAPPEAR Cloudy 05/10/2019 02:04 PM    POCLEUKEST Neg 05/10/2019 02:04 PM    POCNITRITE Neg 05/10/2019 02:04 PM    POCUROBILIGE 0.2 05/10/2019 02:04 PM    POCPROTEIN TR 05/10/2019 02:04 PM    POCURPH 5.5 05/10/2019 02:04 PM    POCBLOOD MOD 05/10/2019 02:04 PM    POCSPGRV 1.030 05/10/2019 02:04 PM    POCKETONES TR 05/10/2019 02:04 PM    POCBILIRUBIN Neg 05/10/2019 02:04 PM    POCGLUCUA Neg 05/10/2019 02:04 PM            Assessment/Plan:     1. Gastroenteritis  ondansetron (ZOFRAN) 4 MG Tab tablet    REFERRAL TO FOLLOW-UP WITH PRIMARY CARE    URINE CULTURE(NEW)   2. Nausea  POCT Urinalysis     Differential diagnosis, natural history, supportive care discussed. Pt directed to start clear liquid diet then transition to BRAT diet. Continue to push fluids, consider hydration salts/Gatorade. Gastroenteritis and BRAT diet educational handout provided.     The pt will be notified of final urine culture results. If sx persist the pt will contact me and I will place order for stool cultures.     Follow-up with primary care provider, referral placed.  If symptoms worsen or persist patient can return to clinic for reevaluation.  Red flags and STRICT emergency room precautions discussed. All side effects of medication discussed including allergic response, GI upset, tendon injury, etc. Patient verbalized understanding of information.    Please note that this dictation was created using voice recognition software. I have made every reasonable attempt to correct obvious errors, but I expect that there are errors of grammar and possibly content that I did not discover before finalizing the note.

## 2019-05-12 LAB
BACTERIA UR CULT: NORMAL
SIGNIFICANT IND 70042: NORMAL
SITE SITE: NORMAL
SOURCE SOURCE: NORMAL

## 2019-05-28 ENCOUNTER — OFFICE VISIT (OUTPATIENT)
Dept: URGENT CARE | Facility: PHYSICIAN GROUP | Age: 59
End: 2019-05-28
Payer: COMMERCIAL

## 2019-05-28 VITALS
HEART RATE: 74 BPM | BODY MASS INDEX: 31.02 KG/M2 | DIASTOLIC BLOOD PRESSURE: 70 MMHG | WEIGHT: 158 LBS | SYSTOLIC BLOOD PRESSURE: 126 MMHG | RESPIRATION RATE: 16 BRPM | OXYGEN SATURATION: 98 % | HEIGHT: 60 IN | TEMPERATURE: 98.9 F

## 2019-05-28 DIAGNOSIS — J02.9 VIRAL PHARYNGITIS: ICD-10-CM

## 2019-05-28 LAB
INT CON NEG: NEGATIVE
INT CON POS: POSITIVE
S PYO AG THROAT QL: NORMAL

## 2019-05-28 PROCEDURE — 99214 OFFICE O/P EST MOD 30 MIN: CPT | Performed by: PHYSICIAN ASSISTANT

## 2019-05-28 PROCEDURE — 87880 STREP A ASSAY W/OPTIC: CPT | Performed by: PHYSICIAN ASSISTANT

## 2019-05-28 RX ORDER — DIPHENHYDRAMINE HYDROCHLORIDE AND LIDOCAINE HYDROCHLORIDE AND ALUMINUM HYDROXIDE AND MAGNESIUM HYDRO
5 KIT EVERY 6 HOURS PRN
Qty: 100 ML | Refills: 0 | Status: SHIPPED | OUTPATIENT
Start: 2019-05-28 | End: 2020-11-07

## 2019-05-28 ASSESSMENT — ENCOUNTER SYMPTOMS
SHORTNESS OF BREATH: 0
FEVER: 0
TROUBLE SWALLOWING: 1
NAUSEA: 0
ABDOMINAL PAIN: 0
COUGH: 0
VOMITING: 0
SPUTUM PRODUCTION: 0
DIARRHEA: 0
HEADACHES: 0
DIZZINESS: 0
SWOLLEN GLANDS: 0
SORE THROAT: 1
MUSCULOSKELETAL NEGATIVE: 1
CHILLS: 0

## 2019-05-29 NOTE — PROGRESS NOTES
Subjective:      Lauren Mandel is a 58 y.o. female who presents with Sore Throat (Hard to swallow, congestion, headache X 2 days)            Pharyngitis    This is a new problem. The current episode started in the past 7 days (2 days). The problem has been unchanged. There has been no fever. The pain is at a severity of 2/10. The pain is mild. Associated symptoms include trouble swallowing. Pertinent negatives include no abdominal pain, congestion, coughing, diarrhea, ear pain, headaches, shortness of breath, swollen glands or vomiting. She has had no exposure to strep or mono. She has tried nothing for the symptoms.     Patient presents to urgent care reporting a 2 day history of sore throat and nasal congestion. No fevers, chills, body aches, cough, chest pain, or SOB. She reports her  and son are both having similar symptoms. No history of tonsillectomy.     Review of Systems   Constitutional: Negative for chills and fever.   HENT: Positive for sore throat and trouble swallowing. Negative for congestion and ear pain.    Respiratory: Negative for cough, sputum production and shortness of breath.    Cardiovascular: Negative for chest pain.   Gastrointestinal: Negative for abdominal pain, diarrhea, nausea and vomiting.   Genitourinary: Negative.    Musculoskeletal: Negative.    Skin: Negative for rash.   Neurological: Negative for dizziness and headaches.        Objective:     /70   Pulse 74   Temp 37.2 °C (98.9 °F)   Resp 16   Ht 1.524 m (5')   Wt 71.7 kg (158 lb)   SpO2 98%   BMI 30.86 kg/m²      Physical Exam   Constitutional: She is oriented to person, place, and time. She appears well-developed and well-nourished. No distress.   HENT:   Head: Normocephalic and atraumatic.   Right Ear: Hearing, tympanic membrane, external ear and ear canal normal.   Left Ear: Hearing, tympanic membrane, external ear and ear canal normal.   Nose: Nose normal.   Mouth/Throat: Posterior oropharyngeal erythema  present. No oropharyngeal exudate or posterior oropharyngeal edema. No tonsillar exudate.   Mild posterior oropharyngeal erythema without enlarged tonsils or exudates noted.    Eyes: Pupils are equal, round, and reactive to light. Conjunctivae are normal. Right eye exhibits no discharge. Left eye exhibits no discharge.   Neck: Normal range of motion.   Cardiovascular: Normal rate, regular rhythm and normal heart sounds.    No murmur heard.  Pulmonary/Chest: Effort normal and breath sounds normal. No respiratory distress. She has no wheezes.   Musculoskeletal: Normal range of motion.   Lymphadenopathy:     She has no cervical adenopathy.   Neurological: She is alert and oriented to person, place, and time.   Skin: Skin is warm and dry. She is not diaphoretic.   Psychiatric: She has a normal mood and affect. Her behavior is normal.   Nursing note and vitals reviewed.         PMH:  has no past medical history of Breast cancer (HCC).  MEDS:   Current Outpatient Prescriptions:   •  DPH-Lido-AlHydr-MgHydr-Simeth (MAGIC MOUTHWASH BLM) Suspension, Take 5 mL by mouth every 6 hours as needed., Disp: 100 mL, Rfl: 0  •  Cholecalciferol (D3-50) 27688 UNIT Cap, D3-50 63273 UNIT CAPS, Disp: , Rfl:   •  cyclosporin (RESTASIS MULTIDOSE) 0.05 % ophthalmic emulsion, RESTASIS MULTIDOSE 0.05 % EMUL, Disp: , Rfl:   •  XIIDRA 5 % Solution, INSTILL 1 DROP IN OU BID, Disp: , Rfl: 3  •  nitrofurantoin monohyd macro (MACROBID) 100 MG Cap, TK 1 C PO BID FOR 5 DAYS FOR UTI, Disp: , Rfl: 0  •  ibuprofen (MOTRIN) 800 MG Tab, Take 1 Tab by mouth every 8 hours as needed., Disp: 30 Tab, Rfl: 0  •  albuterol 108 (90 BASE) MCG/ACT Aero Soln inhalation aerosol, Inhale 2 Puffs by mouth every 6 hours as needed for Shortness of Breath., Disp: 8.5 g, Rfl: 0  •  cyclosporin (RESTASIS) 0.05 % ophthalmic emulsion, Place 1 Drop in both eyes 2 times a day., Disp: , Rfl:   •  Omega-3 Fatty Acids (OMEGA 3 PO), Take  by mouth., Disp: , Rfl:   •  Estradiol  (VIVELLE-DOT) 0.025 MG/24HR PTTW, Apply 1 Patch to skin as directed 2 times a week., Disp: , Rfl:   ALLERGIES:   Allergies   Allergen Reactions   • Pcn [Penicillins]    • Sulfa Drugs      SURGHX:   Past Surgical History:   Procedure Laterality Date   • HYSTERECTOMY LAPAROSCOPY       SOCHX:  reports that she has never smoked. She has never used smokeless tobacco. She reports that she does not drink alcohol or use drugs.  FH: family history is not on file.       Assessment/Plan:     1. Viral pharyngitis  - POCT Rapid Strep A: NEGATIVE  - DPH-Lido-AlHydr-MgHydr-Simeth (MAGIC MOUTHWASH BLM) Suspension; Take 5 mL by mouth every 6 hours as needed.  Dispense: 100 mL; Refill: 0    Advised patient symptoms are most likely viral in etiology, recommend supportive care. Increased fluids and rest. Warm salt water gargles and magic mouthwash as needed for symptomatic relief. Call or return to office if symptoms persist or worsen. The patient demonstrated a good understanding and agreed with the treatment plan.

## 2020-11-07 ENCOUNTER — OFFICE VISIT (OUTPATIENT)
Dept: URGENT CARE | Facility: CLINIC | Age: 60
End: 2020-11-07
Payer: COMMERCIAL

## 2020-11-07 ENCOUNTER — HOSPITAL ENCOUNTER (OUTPATIENT)
Facility: MEDICAL CENTER | Age: 60
End: 2020-11-07
Attending: PHYSICIAN ASSISTANT
Payer: COMMERCIAL

## 2020-11-07 VITALS
BODY MASS INDEX: 31.41 KG/M2 | WEIGHT: 160 LBS | HEIGHT: 60 IN | SYSTOLIC BLOOD PRESSURE: 132 MMHG | DIASTOLIC BLOOD PRESSURE: 96 MMHG | TEMPERATURE: 98.2 F | OXYGEN SATURATION: 96 % | HEART RATE: 83 BPM | RESPIRATION RATE: 16 BRPM

## 2020-11-07 DIAGNOSIS — J01.40 ACUTE NON-RECURRENT PANSINUSITIS: Primary | ICD-10-CM

## 2020-11-07 DIAGNOSIS — R51.9 SINUS HEADACHE: ICD-10-CM

## 2020-11-07 DIAGNOSIS — R09.82 POST-NASAL DRIP: ICD-10-CM

## 2020-11-07 PROCEDURE — U0003 INFECTIOUS AGENT DETECTION BY NUCLEIC ACID (DNA OR RNA); SEVERE ACUTE RESPIRATORY SYNDROME CORONAVIRUS 2 (SARS-COV-2) (CORONAVIRUS DISEASE [COVID-19]), AMPLIFIED PROBE TECHNIQUE, MAKING USE OF HIGH THROUGHPUT TECHNOLOGIES AS DESCRIBED BY CMS-2020-01-R: HCPCS

## 2020-11-07 PROCEDURE — 99214 OFFICE O/P EST MOD 30 MIN: CPT | Performed by: PHYSICIAN ASSISTANT

## 2020-11-07 RX ORDER — AZITHROMYCIN 250 MG/1
TABLET, FILM COATED ORAL
Qty: 6 TAB | Refills: 0 | Status: SHIPPED | OUTPATIENT
Start: 2020-11-07 | End: 2021-08-05

## 2020-11-07 ASSESSMENT — ENCOUNTER SYMPTOMS: HEADACHES: 1

## 2020-11-07 NOTE — PROGRESS NOTES
Subjective:      Lauren Mandel is a 60 y.o. female who presents with Sinus Problem (x1 month ) and Headache    Medications:    • albuterol Aers  • Cholecalciferol Caps  • cyclosporin  • ibuprofen Tabs  • OMEGA 3 PO  • Xiidra Soln    Allergies: Pcn [penicillins] and Sulfa drugs    Problem List: Lauren Mandel does not have a problem list on file.    Surgical History:  Past Surgical History:   Procedure Laterality Date   • HYSTERECTOMY LAPAROSCOPY         Past Social Hx: Lauren Mandel  reports that she has never smoked. She has never used smokeless tobacco. She reports that she does not drink alcohol or use drugs.     Past Family Hx:  Lauren Mandel family history is not on file. Not pertinent to today's visit.       Problem list, medications, and allergies reviewed by myself today in Epic.           Patient presents with:  Sinus Problem: x1 month   Headache    PT co sinus congestion, face pain and pressure above eyes, cheeks that has been getting progressively worse for the last month.  Pt has a persistent sinus headache and post nasal drip, but denies fever, chills, sore throat, ear pain, cough, chest pain or shortness of breath.  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  Pt states she thinks she has a sinus infection , but feels she should get a COVID test too since her symptoms are respiratory in nature.      Pt is taking OTC allergy medicine and using flonase without change in symptoms.       Sinus Problem  This is a new problem. The current episode started 1 to 4 weeks ago. The problem has been gradually worsening since onset. There has been no fever. Associated symptoms include congestion and headaches. Pertinent negatives include no chills, coughing, shortness of breath or sore throat. Past treatments include acetaminophen (allergy medicine, flonase). The treatment provided no relief.       Review of Systems   Constitutional: Negative for  chills and fever.   HENT: Positive for congestion and sinus pain. Negative for sore throat.    Respiratory: Negative for cough, sputum production and shortness of breath.    Cardiovascular: Negative for chest pain.   Gastrointestinal: Negative.    Neurological: Positive for headaches.   All other systems reviewed and are negative.         Objective:     /96   Pulse 83   Temp 36.8 °C (98.2 °F) (Temporal)   Resp 16   Ht 1.524 m (5')   Wt 72.6 kg (160 lb)   SpO2 96%   BMI 31.25 kg/m²      Physical Exam  Vitals signs and nursing note reviewed.   Constitutional:       General: She is not in acute distress.     Appearance: Normal appearance. She is well-developed and normal weight.   HENT:      Head: Normocephalic and atraumatic.      Right Ear: Tympanic membrane normal.      Left Ear: Tympanic membrane normal.      Nose: Mucosal edema and congestion present.      Right Turbinates: Enlarged.      Left Turbinates: Enlarged.      Right Sinus: Maxillary sinus tenderness and frontal sinus tenderness present.      Left Sinus: Maxillary sinus tenderness and frontal sinus tenderness present.      Mouth/Throat:      Lips: Pink.      Mouth: Mucous membranes are moist.      Pharynx: Uvula midline. Posterior oropharyngeal erythema present. No oropharyngeal exudate.   Eyes:      Extraocular Movements: Extraocular movements intact.      Conjunctiva/sclera: Conjunctivae normal.      Pupils: Pupils are equal, round, and reactive to light.   Neck:      Musculoskeletal: Normal range of motion and neck supple.   Cardiovascular:      Rate and Rhythm: Normal rate and regular rhythm.      Pulses: Normal pulses.      Heart sounds: Normal heart sounds.   Pulmonary:      Effort: Pulmonary effort is normal. No respiratory distress.      Breath sounds: Normal breath sounds.   Abdominal:      Palpations: Abdomen is soft.   Musculoskeletal: Normal range of motion.   Lymphadenopathy:      Cervical: No cervical adenopathy.   Skin:      General: Skin is warm and dry.      Capillary Refill: Capillary refill takes less than 2 seconds.   Neurological:      General: No focal deficit present.      Mental Status: She is alert and oriented to person, place, and time.      Gait: Gait normal.   Psychiatric:         Mood and Affect: Mood normal.               Assessment/Plan:          1. Acute non-recurrent pansinusitis  azithromycin (ZITHROMAX) 250 MG Tab    COVID/SARS COV-2 PCR   2. Post-nasal drip  azithromycin (ZITHROMAX) 250 MG Tab    COVID/SARS COV-2 PCR   3. Sinus headache  azithromycin (ZITHROMAX) 250 MG Tab    COVID/SARS COV-2 PCR     PT to begin medications today as prescribed.    PT can continue OTC medications, increase fluids and rest until symptoms improve.     Will release COVID results to [facility].  Pt should self quarantine until results are known.     PT should follow up with PCP in 1-2 days for re-evaluation if symptoms have not improved.  Discussed red flags and reasons to return to UC or ED.  Pt and/or family verbalized understanding of diagnosis and follow up instructions and was offered informational handout on diagnosis.  PT discharged.

## 2020-11-07 NOTE — LETTER
November 7, 2020        Lauren Mandel  [address] [place] [address]      To Whom it May Concern,     Your employee was seen in our clinic today.  A concern for COVID-19 has been identified and testing is in progress.     We are asking you to excuse absences while following self-isolation protocol per Center for Disease Control (CDC) guidelines.  Your employee will be able to access test results through our electronic delivery system called [employer].     If the results of testing are negative, and once there has been no fever (temperature >100.4 F) for at least 72 hours without treatment, and no vomiting or diarrhea for at least 48 hours, then return to work is approved.     If the results of testing are positive then your employee will be contacted by the [facility] or [facility] department for further instructions on duration of self-isolation and return to work protocol. In general, this will also follow the CDC guidelines with a minimum of 10 days from the onset of symptoms and without fever, vomiting, or diarrhea as above.     In general, repeat testing is not necessary and not offered through our [facility] care.     This is the only note that will be provided from [facility] for this visit.  Your employee will require an appointment with a primary care provider if FMLA or disability forms are required.     Sincerely,                       Yojana Nix P.A.-C.    Electronically Signed

## 2020-11-09 DIAGNOSIS — R51.9 SINUS HEADACHE: ICD-10-CM

## 2020-11-09 DIAGNOSIS — J01.40 ACUTE NON-RECURRENT PANSINUSITIS: ICD-10-CM

## 2020-11-09 DIAGNOSIS — R09.82 POST-NASAL DRIP: ICD-10-CM

## 2020-11-09 LAB
COVID ORDER STATUS COVID19: NORMAL
SARS-COV-2 RNA RESP QL NAA+PROBE: NOTDETECTED
SPECIMEN SOURCE: NORMAL

## 2020-11-15 ASSESSMENT — ENCOUNTER SYMPTOMS
SPUTUM PRODUCTION: 0
GASTROINTESTINAL NEGATIVE: 1
SINUS PAIN: 1
SHORTNESS OF BREATH: 0
COUGH: 0
SORE THROAT: 0
CHILLS: 0
FEVER: 0

## 2020-11-15 NOTE — PATIENT INSTRUCTIONS
INSTRUCTIONS FOR COVID-19 OR ANY OTHER INFECTIOUS RESPIRATORY ILLNESSES    The Centers for Disease Control and Prevention (CDC) states that early indications for COVID-19 include cough, shortness of breath, difficulty breathing, or at least two of the following symptoms: chills, shaking with chills, muscle pain, headache, sore throat, and loss of taste or smell. Symptoms can range from mild to severe and may appear up to two weeks after exposure to the virus.    The practice of self-isolation and quarantine helps protect the public and your family by  preventing exposure to people who have or may have a contagious disease. Please follow the prevention steps below as based on CDC guidelines:    WHEN TO STOP ISOLATION: Persons with COVID-19 or any other infectious respiratory illness who have symptoms and were advised to care for themselves at home may discontinue home isolation under the following conditions:  · At least 24 hours have passed since recovery defined as resolution of fever without the use of fever-reducing medications; AND,  · Improvement in respiratory symptoms (e.g., cough, shortness of breath); AND,  · At least 10 days have passed since symptoms first appeared and have had no subsequent illness.    MONITOR YOUR SYMPTOMS: If your illness is worsening, seek prompt medical attention. If you have a medical emergency and need to call 911, notify the dispatch personnel that you have, or are being evaluated for confirmed or suspected COVID-19 or another infectious respiratory illness. Wear a facemask if possible.    ACTIVITY RESTRICTION: restrict activities outside your home, except for getting medical care. Do not go to work, school, or public areas. Avoid using public transportation, ride-sharing, or taxis.    SCHEDULED MEDICAL APPOINTMENTS: Notify your provider that you have, or are being evaluated for, confirmed or suspected COVID-19 or another infectious respiratory. This will help the healthcare  provider’s office safely take care of you and keep other people from getting exposed or infected.    FACEMASKS, when to wear: Anytime you are away from your home or around other people or pets. If you are unable to wear one, maintain a minimum of 6 feet distancing from others.    LIVING ENVIRONMENT: Stay in a separate room from other people and pets. If possible, use a separate bathroom, have someone else care for your pets and avoid sharing household items. Any items used should be washed thoroughly with soap and water. Clean all “high-touch” surfaces every day. Use a household cleaning spray or wipe, according to the label instructions. High touch surfaces include (but are not limited to) counters, tabletops, doorknobs, bathroom fixtures, toilets, phones, keyboards, tablets, and bedside tables.     HAND WASHING: Frequently wash hands with soap and water for at least 20 seconds,  especially after blowing your nose, coughing, or sneezing; going to the bathroom; before and after interacting with pets; and before and after eating or preparing food. If hands are visibly dirty use soap and water. If soap and water are not available, use an alcohol-based hand  with at least 60% alcohol. Avoid touching your eyes, nose, and mouth with unwashed hands. Cover your coughs and sneezes with a tissue. Throw used tissues in a lined trash can. Immediately wash your hands.    ACTIVE/FACILITATED SELF-MONITORING: Follow instructions provided by your local health department or health professionals, as appropriate. When working with your local health department check their available hours.    [facility]   Phone Number   [facility] [phone]   [facility] [place] [phone]   [place] [address]   [place] [phone]     IF YOU HAVE CONFIRMED POSITIVE COVID-19:    Those who have completely recovered from COVID-19 may have immune-boosting antibodies in their plasma--called “convalescent plasma”--that could be  used to treat critically ill COVID19 patients.    [facility] is excited to begin working with [employer] on collecting convalescent plasma from  people who have recovered from COVID-19 as part of a program to treat patients infected with the virus. This FDA-approved “emergency investigational new drug” is a special blood product containing antibodies that may give patients an extra boost to fight the virus.    To be eligible to donate convalescent plasma, you must have a prior COVID-19 diagnosis documented by a laboratory test (or a positive test result for SARS-CoV-2 antibodies) and meet additional eligibility requirements.    If you are interested in donating convalescent plasma or have any additional questions, please contact the [facility] Convalescent Plasma  at [phone] or via e-mail at [email or web address].

## 2020-12-02 ENCOUNTER — HOSPITAL ENCOUNTER (OUTPATIENT)
Facility: MEDICAL CENTER | Age: 60
End: 2020-12-02
Attending: UROLOGY
Payer: COMMERCIAL

## 2020-12-02 PROCEDURE — 87086 URINE CULTURE/COLONY COUNT: CPT

## 2020-12-05 LAB
BACTERIA UR CULT: NORMAL
SIGNIFICANT IND 70042: NORMAL
SITE SITE: NORMAL
SOURCE SOURCE: NORMAL

## 2021-01-26 ENCOUNTER — HOSPITAL ENCOUNTER (OUTPATIENT)
Facility: MEDICAL CENTER | Age: 61
End: 2021-01-26
Attending: UROLOGY
Payer: COMMERCIAL

## 2021-01-26 PROCEDURE — 87086 URINE CULTURE/COLONY COUNT: CPT

## 2021-01-29 LAB
BACTERIA UR CULT: NORMAL
SIGNIFICANT IND 70042: NORMAL
SITE SITE: NORMAL
SOURCE SOURCE: NORMAL

## 2021-02-05 ENCOUNTER — HOSPITAL ENCOUNTER (EMERGENCY)
Facility: MEDICAL CENTER | Age: 61
End: 2021-02-05
Payer: COMMERCIAL

## 2021-02-05 ENCOUNTER — HOSPITAL ENCOUNTER (EMERGENCY)
Facility: MEDICAL CENTER | Age: 61
End: 2021-02-05
Attending: EMERGENCY MEDICINE | Admitting: EMERGENCY MEDICINE
Payer: COMMERCIAL

## 2021-02-05 ENCOUNTER — APPOINTMENT (OUTPATIENT)
Dept: RADIOLOGY | Facility: MEDICAL CENTER | Age: 61
End: 2021-02-05
Attending: EMERGENCY MEDICINE
Payer: COMMERCIAL

## 2021-02-05 VITALS
OXYGEN SATURATION: 98 % | SYSTOLIC BLOOD PRESSURE: 149 MMHG | RESPIRATION RATE: 18 BRPM | HEART RATE: 76 BPM | WEIGHT: 160.94 LBS | DIASTOLIC BLOOD PRESSURE: 87 MMHG | BODY MASS INDEX: 31.43 KG/M2 | TEMPERATURE: 98.5 F

## 2021-02-05 DIAGNOSIS — S20.211A CONTUSION OF RIB ON RIGHT SIDE, INITIAL ENCOUNTER: ICD-10-CM

## 2021-02-05 PROCEDURE — A9270 NON-COVERED ITEM OR SERVICE: HCPCS | Performed by: EMERGENCY MEDICINE

## 2021-02-05 PROCEDURE — 700102 HCHG RX REV CODE 250 W/ 637 OVERRIDE(OP): Performed by: EMERGENCY MEDICINE

## 2021-02-05 PROCEDURE — 700111 HCHG RX REV CODE 636 W/ 250 OVERRIDE (IP): Performed by: EMERGENCY MEDICINE

## 2021-02-05 PROCEDURE — 72131 CT LUMBAR SPINE W/O DYE: CPT

## 2021-02-05 PROCEDURE — 72128 CT CHEST SPINE W/O DYE: CPT

## 2021-02-05 PROCEDURE — 99284 EMERGENCY DEPT VISIT MOD MDM: CPT

## 2021-02-05 RX ORDER — HYDROCODONE BITARTRATE AND ACETAMINOPHEN 10; 325 MG/1; MG/1
1 TABLET ORAL ONCE
Status: COMPLETED | OUTPATIENT
Start: 2021-02-05 | End: 2021-02-05

## 2021-02-05 RX ORDER — NAPROXEN 500 MG/1
500 TABLET ORAL 2 TIMES DAILY WITH MEALS
Qty: 30 TAB | Refills: 0 | Status: SHIPPED | OUTPATIENT
Start: 2021-02-05 | End: 2021-08-05

## 2021-02-05 RX ORDER — LIDOCAINE 50 MG/G
1 PATCH TOPICAL EVERY 24 HOURS
Qty: 10 PATCH | Refills: 0 | Status: SHIPPED | OUTPATIENT
Start: 2021-02-05 | End: 2021-08-05

## 2021-02-05 RX ORDER — ONDANSETRON 4 MG/1
4 TABLET, ORALLY DISINTEGRATING ORAL ONCE
Status: COMPLETED | OUTPATIENT
Start: 2021-02-05 | End: 2021-02-05

## 2021-02-05 RX ADMIN — HYDROCODONE BITARTRATE AND ACETAMINOPHEN 1 TABLET: 10; 325 TABLET ORAL at 11:39

## 2021-02-05 RX ADMIN — ONDANSETRON 4 MG: 4 TABLET, ORALLY DISINTEGRATING ORAL at 12:52

## 2021-02-05 NOTE — ED PROVIDER NOTES
ED Provider Note    ED Provider Note    Primary care provider: Raiza Castillo M.D.  Means of arrival: Walk-in  History obtained from: Patient    CHIEF COMPLAINT  Chief Complaint   Patient presents with   • Rib Injury     Seen at 11:22 AM.   HPI  Lauren Mandel is a 60 y.o. female who presents to the Emergency Department moderate severe upper back pain and lower back pain after a fall.  The patient states that she was in the shower this morning when she slipped backwards, striking the mid back on the soap dish and falling.  She had her daughter assist her getting out of the tub.  She was able to ambulate afterwards.  She went to her chiropractic clinic today where they did x-rays.  She is sent here for further evaluation.    The patient is sent with a CD of the images, however we cannot load the images on our system.  The interpretation is a fracture of the right posterior eighth and ninth ribs.    The patient denies any headache, head trauma, neck pain, numbness, weakness, bleeding diathesis, chest pain, shortness of breath, abdominal pain.    REVIEW OF SYSTEMS  See HPI,   Remainder of ROS negative.     PAST MEDICAL HISTORY   Chronic back pain, uses chiropractics for this.    SURGICAL HISTORY   has a past surgical history that includes hysterectomy laparoscopy.    SOCIAL HISTORY  Social History     Tobacco Use   • Smoking status: Never Smoker   • Smokeless tobacco: Never Used   Substance Use Topics   • Alcohol use: No   • Drug use: No      Social History     Substance and Sexual Activity   Drug Use No       FAMILY HISTORY  History reviewed. No pertinent family history.    CURRENT MEDICATIONS  Reviewed.  See Encounter Summary.     ALLERGIES  Allergies   Allergen Reactions   • Pcn [Penicillins]    • Sulfa Drugs        PHYSICAL EXAM  VITAL SIGNS: /90   Pulse 74   Temp 36.9 °C (98.4 °F) (Temporal)   Resp 18   Wt 73 kg (160 lb 15 oz)   SpO2 97%   BMI 31.43 kg/m²   Constitutional: Awake, alert in no  apparent distress.  HENT: Normocephalic, Bilateral external ears normal. Nose normal.   Eyes: Conjunctiva normal, non-icteric, EOMI.    Thorax & Lungs: Easy unlabored respirations, Clear to ascultation bilaterally.  Slight redness to the right posterior ribs just lateral to midline, at the eighth and ninth rib.  There is tenderness throughout the thoracic and lumbar spine without any obvious ecchymosis or step-off.  Cardiovascular: Regular rate, Regular rhythm, No murmurs, rubs or gallops. Bilateral pulses symmetrical.   Abdomen:  Soft, nontender, nondistended, normal active bowel sounds.   :    Skin: Visualized skin is  Dry, No erythema, No rash.   Musculoskeletal:   No cyanosis, clubbing or edema. No leg asymmetry.   Neurologic: Alert, Grossly non-focal.  Normal speech, stance and gait.  Sensation intact light touch throughout.  Psychiatric: Normal affect, Normal mood  Lymphatic:  No cervical LAD    RADIOLOGY  CT-LSPINE W/O PLUS RECONS   Final Result      1.  No lumbar spine fracture or subluxation.   2.  Mild facet hypertrophy at L5-S1.      CT-TSPINE W/O PLUS RECONS   Final Result      1.  No thoracic spine fracture or subluxation.   2.  Mild multilevel degenerative changes.            COURSE & MEDICAL DECISION MAKING  Pertinent Labs & Imaging studies reviewed. (See chart for details)    Differential diagnoses include but are not limited to: Rib fracture, thoracic or lumbar fracture.    11:22 AM - Medical record reviewed, no recent ER visits.    Decision Making:  This is a 60 y.o. year old female who presents with neck pain and thoracic rib pain after a fall.  The patient apparently had x-rays of the thoracic spine as well as a rib series.  The images reportedly revealed 8 and ninth rib fracture.  Unfortunately we are unable to load the images today.  The patient does not have any chest pain or shortness of breath.  Do not suspect pneumothorax.  Management for rib fractures are supportive, I did not feel that  a repeat rib or chest x-ray is clinically indicated based on her well appearance.  Because she did have diffuse thoracic back pain and direct trauma I obtained a CT of the T and L-spine which were negative for any fracture.  The CT  is negative for pneumothorax, the thoracic series also does obtain a good view of the posterior ribs and lung parenchyma as well which is again negative for pneumothorax and no obvious fractures either.    The patient will be treated supportively.  She had many questions about this, overall it seems unlikely that she actually had a fracture given that we did not appreciate 1 on the CT.  She has an incentive spirometer, I will place her on lidocaine patches and anti-inflammatories as well.  We discussed the possibility of developing a pneumonia following a contusion.  If she notes any fevers, shortness of breath I recommend repeat evaluation.    Discharge Medications:  New Prescriptions    LIDOCAINE (LIDODERM) 5 % PATCH    Place 1 Patch on the skin every 24 hours.    NAPROXEN (NAPROSYN) 500 MG TAB    Take 1 Tab by mouth 2 times a day, with meals.       The patient was discharged home (see d/c instructions) was told to return immediately for any signs or symptoms listed, or any worsening at all.  The patient verbally agreed to the discharge precautions and follow-up plan which is documented in EPIC.    The patient's blood pressure is elevated today. >120/80. I have referred them to primary care for follow up.       FINAL IMPRESSION  1. Contusion of rib on right side, initial encounter

## 2021-02-05 NOTE — ED TRIAGE NOTES
Earlier this AM, while in the shower, reaching for a towel pt fell injuring her right ribcage.   Chief Complaint   Patient presents with   • Rib Injury     /90   Pulse 74   Temp 36.9 °C (98.4 °F) (Temporal)   Resp 18   Wt 73 kg (160 lb 15 oz)   SpO2 97%   BMI 31.43 kg/m²

## 2021-02-15 ENCOUNTER — HOSPITAL ENCOUNTER (OUTPATIENT)
Dept: RADIOLOGY | Facility: MEDICAL CENTER | Age: 61
End: 2021-02-15
Payer: COMMERCIAL

## 2021-03-15 DIAGNOSIS — Z23 NEED FOR VACCINATION: ICD-10-CM

## 2021-03-30 ENCOUNTER — IMMUNIZATION (OUTPATIENT)
Dept: FAMILY PLANNING/WOMEN'S HEALTH CLINIC | Facility: IMMUNIZATION CENTER | Age: 61
End: 2021-03-30
Attending: INTERNAL MEDICINE
Payer: COMMERCIAL

## 2021-03-30 DIAGNOSIS — Z23 NEED FOR VACCINATION: ICD-10-CM

## 2021-03-30 DIAGNOSIS — Z23 ENCOUNTER FOR VACCINATION: Primary | ICD-10-CM

## 2021-03-30 PROCEDURE — 0001A PFIZER SARS-COV-2 VACCINE: CPT

## 2021-03-30 PROCEDURE — 91300 PFIZER SARS-COV-2 VACCINE: CPT

## 2021-04-23 ENCOUNTER — IMMUNIZATION (OUTPATIENT)
Dept: FAMILY PLANNING/WOMEN'S HEALTH CLINIC | Facility: IMMUNIZATION CENTER | Age: 61
End: 2021-04-23
Attending: INTERNAL MEDICINE
Payer: COMMERCIAL

## 2021-04-23 DIAGNOSIS — Z23 ENCOUNTER FOR VACCINATION: Primary | ICD-10-CM

## 2021-04-23 PROCEDURE — 0002A PFIZER SARS-COV-2 VACCINE: CPT | Performed by: NURSE PRACTITIONER

## 2021-04-23 PROCEDURE — 91300 PFIZER SARS-COV-2 VACCINE: CPT | Performed by: NURSE PRACTITIONER

## 2021-08-05 ENCOUNTER — OFFICE VISIT (OUTPATIENT)
Dept: URGENT CARE | Facility: PHYSICIAN GROUP | Age: 61
End: 2021-08-05
Payer: COMMERCIAL

## 2021-08-05 ENCOUNTER — HOSPITAL ENCOUNTER (OUTPATIENT)
Facility: MEDICAL CENTER | Age: 61
End: 2021-08-05
Attending: PHYSICIAN ASSISTANT
Payer: COMMERCIAL

## 2021-08-05 VITALS
HEART RATE: 60 BPM | BODY MASS INDEX: 31.41 KG/M2 | SYSTOLIC BLOOD PRESSURE: 140 MMHG | RESPIRATION RATE: 18 BRPM | WEIGHT: 160 LBS | HEIGHT: 60 IN | TEMPERATURE: 97.1 F | DIASTOLIC BLOOD PRESSURE: 90 MMHG | OXYGEN SATURATION: 98 %

## 2021-08-05 DIAGNOSIS — J98.8 RTI (RESPIRATORY TRACT INFECTION): ICD-10-CM

## 2021-08-05 PROCEDURE — 99214 OFFICE O/P EST MOD 30 MIN: CPT | Performed by: PHYSICIAN ASSISTANT

## 2021-08-05 PROCEDURE — U0003 INFECTIOUS AGENT DETECTION BY NUCLEIC ACID (DNA OR RNA); SEVERE ACUTE RESPIRATORY SYNDROME CORONAVIRUS 2 (SARS-COV-2) (CORONAVIRUS DISEASE [COVID-19]), AMPLIFIED PROBE TECHNIQUE, MAKING USE OF HIGH THROUGHPUT TECHNOLOGIES AS DESCRIBED BY CMS-2020-01-R: HCPCS

## 2021-08-05 RX ORDER — BENZONATATE 100 MG/1
100 CAPSULE ORAL 3 TIMES DAILY PRN
Qty: 60 CAPSULE | Refills: 0 | Status: SHIPPED | OUTPATIENT
Start: 2021-08-05 | End: 2022-02-08

## 2021-08-05 RX ORDER — CODEINE PHOSPHATE AND GUAIFENESIN 10; 100 MG/5ML; MG/5ML
5 SOLUTION ORAL EVERY 6 HOURS PRN
Qty: 100 ML | Refills: 0 | Status: SHIPPED | OUTPATIENT
Start: 2021-08-05 | End: 2021-08-10

## 2021-08-05 ASSESSMENT — ENCOUNTER SYMPTOMS
PALPITATIONS: 0
FEVER: 0
WHEEZING: 0
HEADACHES: 1
MYALGIAS: 1
SHORTNESS OF BREATH: 1
SPUTUM PRODUCTION: 1
COUGH: 1

## 2021-08-06 DIAGNOSIS — J98.8 RTI (RESPIRATORY TRACT INFECTION): ICD-10-CM

## 2021-08-06 LAB — COVID ORDER STATUS COVID19: NORMAL

## 2021-08-06 NOTE — PROGRESS NOTES
Subjective:   Lauren Mandel is a 61 y.o. female who presents today with   Chief Complaint   Patient presents with   • Sore Throat     couch, light headed,not breathing good,light yellow/clear mucus, no tast, no appetite,headaches,x4days      Cough  This is a new problem. Episode onset: 4 days. The problem has been unchanged. The problem occurs every few minutes. The cough is productive of sputum. Associated symptoms include headaches, myalgias, nasal congestion and shortness of breath. Pertinent negatives include no chest pain, fever or wheezing. She has tried nothing for the symptoms. The treatment provided no relief.     Patient has fatigue along with cough and loss of taste along with some headaches.  She states that family members at home have also been sick over the last couple weeks and she had a Covid test about 2 or 3 weeks ago that was negative but her symptoms seem to start within the last 4 or 5 days.  I personally donned proper PPE throughout visit today.   Patient is vaccinated.  PMH:  has no past medical history of Breast cancer (HCC).  MEDS:   Current Outpatient Medications:   •  Acetaminophen (TYLENOL) 325 MG Cap, Tylenol, Disp: , Rfl:   •  guaifenesin-codeine (ROBITUSSIN AC) Solution oral solution, Take 5 mL by mouth every 6 hours as needed for Cough for up to 5 days., Disp: 100 mL, Rfl: 0  •  benzonatate (TESSALON) 100 MG Cap, Take 1 capsule by mouth 3 times a day as needed for Cough., Disp: 60 capsule, Rfl: 0  •  XIIDRA 5 % Solution, INSTILL 1 DROP IN OU BID, Disp: , Rfl: 3  •  ibuprofen (MOTRIN) 800 MG Tab, Take 1 Tab by mouth every 8 hours as needed., Disp: 30 Tab, Rfl: 0  •  cyclosporin (RESTASIS) 0.05 % ophthalmic emulsion, Place 1 Drop in both eyes 2 times a day., Disp: , Rfl:   •  Omega-3 Fatty Acids (OMEGA 3 PO), Take  by mouth., Disp: , Rfl:   ALLERGIES:   Allergies   Allergen Reactions   • Lac Bovis    • Pcn [Penicillins]    • Sulfa Drugs      SURGHX:   Past Surgical History:    Procedure Laterality Date   • HYSTERECTOMY LAPAROSCOPY       SOCHX:  reports that she has never smoked. She has never used smokeless tobacco. She reports that she does not drink alcohol and does not use drugs.  FH: Reviewed with patient, not pertinent to this visit.     Review of Systems   Constitutional: Negative for fever.   Respiratory: Positive for cough, sputum production and shortness of breath. Negative for wheezing.    Cardiovascular: Negative for chest pain and palpitations.   Musculoskeletal: Positive for myalgias.   Neurological: Positive for headaches.      Objective:   /90 (BP Location: Right arm, Patient Position: Sitting, BP Cuff Size: Adult)   Pulse 60   Temp 36.2 °C (97.1 °F) (Temporal)   Resp 18   Ht 1.524 m (5')   Wt 72.6 kg (160 lb)   SpO2 98%   BMI 31.25 kg/m²   Physical Exam  Vitals and nursing note reviewed.   Constitutional:       General: She is not in acute distress.     Appearance: Normal appearance. She is well-developed. She is not ill-appearing or toxic-appearing.   HENT:      Head: Normocephalic and atraumatic.      Right Ear: Hearing normal.      Left Ear: Hearing normal.      Nose: Congestion present.      Mouth/Throat:      Pharynx: No oropharyngeal exudate or posterior oropharyngeal erythema.   Eyes:      Conjunctiva/sclera: Conjunctivae normal.   Cardiovascular:      Rate and Rhythm: Normal rate and regular rhythm.      Heart sounds: Normal heart sounds.   Pulmonary:      Effort: Pulmonary effort is normal.      Breath sounds: Normal breath sounds. No wheezing, rhonchi or rales.   Musculoskeletal:      Comments: Normal movement in all 4 extremities   Skin:     General: Skin is warm and dry.   Neurological:      Mental Status: She is alert.      Coordination: Coordination normal.   Psychiatric:         Mood and Affect: Mood normal.       Assessment/Plan:   Assessment    1. RTI (respiratory tract infection)  - guaifenesin-codeine (ROBITUSSIN AC) Solution oral  solution; Take 5 mL by mouth every 6 hours as needed for Cough for up to 5 days.  Dispense: 100 mL; Refill: 0  - benzonatate (TESSALON) 100 MG Cap; Take 1 capsule by mouth 3 times a day as needed for Cough.  Dispense: 60 capsule; Refill: 0  - SARS-CoV-2 PCR (24 hour In-House): Collect NP swab in V[facility]; Future    Other orders  - Acetaminophen (TYLENOL) 325 MG Cap; Tylenol  Symptoms and presentation are most likely acute viral illness.  Patient is understanding to only use the cough syrup at night sparingly as prescribed not before driving or working.  Discussed CDC guidelines including self isolation at home.   Patient encouraged to get plenty of rest, use OTC tylenol for pain/fever, and drink plenty of fluids.  No indication for antibiotics at this time.   Differential diagnosis, natural history, supportive care, and indications for immediate follow-up discussed.   Patient given instructions and understanding of medications and treatment.    If not improving in 3-5 days, F/U with PCP or return to  if symptoms worsen.    Patient agreeable to plan.  Greater than 30 minutes were spent reviewing patient's chart, examining and obtaining history from patient, and discussing plan of care.       Please note that this dictation was created using voice recognition software. I have made every reasonable attempt to correct obvious errors, but I expect that there are errors of grammar and possibly content that I did not discover before finalizing the note.    Kingsley Dinh PA-C

## 2021-08-08 LAB
SARS-COV-2 RNA RESP QL NAA+PROBE: NOTDETECTED
SPECIMEN SOURCE: NORMAL

## 2022-02-08 ENCOUNTER — OFFICE VISIT (OUTPATIENT)
Dept: URGENT CARE | Facility: PHYSICIAN GROUP | Age: 62
End: 2022-02-08
Payer: COMMERCIAL

## 2022-02-08 ENCOUNTER — APPOINTMENT (OUTPATIENT)
Dept: RADIOLOGY | Facility: IMAGING CENTER | Age: 62
End: 2022-02-08
Attending: PHYSICIAN ASSISTANT
Payer: COMMERCIAL

## 2022-02-08 VITALS
DIASTOLIC BLOOD PRESSURE: 82 MMHG | RESPIRATION RATE: 20 BRPM | TEMPERATURE: 97.9 F | HEIGHT: 60 IN | OXYGEN SATURATION: 95 % | SYSTOLIC BLOOD PRESSURE: 134 MMHG | WEIGHT: 166 LBS | BODY MASS INDEX: 32.59 KG/M2 | HEART RATE: 94 BPM

## 2022-02-08 DIAGNOSIS — R05.9 COUGH: ICD-10-CM

## 2022-02-08 DIAGNOSIS — J98.8 RESPIRATORY TRACT INFECTION: ICD-10-CM

## 2022-02-08 PROCEDURE — 99213 OFFICE O/P EST LOW 20 MIN: CPT | Performed by: PHYSICIAN ASSISTANT

## 2022-02-08 PROCEDURE — 71046 X-RAY EXAM CHEST 2 VIEWS: CPT | Mod: TC | Performed by: RADIOLOGY

## 2022-02-08 RX ORDER — PREDNISONE 10 MG/1
10 TABLET ORAL DAILY
Qty: 5 TABLET | Refills: 0 | Status: SHIPPED | OUTPATIENT
Start: 2022-02-08 | End: 2022-02-13

## 2022-02-08 RX ORDER — BENZONATATE 100 MG/1
100 CAPSULE ORAL 3 TIMES DAILY PRN
Qty: 60 CAPSULE | Refills: 0 | Status: SHIPPED | OUTPATIENT
Start: 2022-02-08 | End: 2023-02-06

## 2022-02-08 RX ORDER — FLUTICASONE PROPIONATE 50 MCG
SPRAY, SUSPENSION (ML) NASAL
COMMUNITY
Start: 2022-01-07 | End: 2023-02-06

## 2022-02-08 RX ORDER — PREDNISONE 20 MG/1
TABLET ORAL
COMMUNITY
Start: 2022-01-07 | End: 2022-02-08

## 2022-02-08 RX ORDER — DOXYCYCLINE HYCLATE 100 MG
100 TABLET ORAL 2 TIMES DAILY
Qty: 14 TABLET | Refills: 0 | Status: SHIPPED | OUTPATIENT
Start: 2022-02-08 | End: 2022-02-15

## 2022-02-08 ASSESSMENT — ENCOUNTER SYMPTOMS
SHORTNESS OF BREATH: 1
SINUS PAIN: 1
DIARRHEA: 1
FEVER: 0
VOMITING: 0
MYALGIAS: 0
EYE REDNESS: 0
COUGH: 1
HEADACHES: 1
NAUSEA: 0
EYE DISCHARGE: 0
SORE THROAT: 1

## 2022-02-09 NOTE — PROGRESS NOTES
Subjective     Lauren Mandel is a 61 y.o. female who presents with Cough (congestion,headache,sore throat,diarrhea,x1 month)            Cough  This is a new problem. Episode onset: x 1 month ago. The problem has been unchanged. The cough is non-productive. Associated symptoms include headaches, nasal congestion, a sore throat and shortness of breath (The patient reports intermittent shortness of breath secondary to coughing.). Pertinent negatives include no chest pain, ear pain, eye redness, fever, myalgias or rash. The symptoms are aggravated by lying down. She has tried OTC cough suppressant ([place] Cough, as well as OTC Flonase) for the symptoms.     The patient states she tested positive for COVID-19 12/27. The patient reports continued cough and congestion since her COVID-19 infection.  The patient states she was previously prescribed a course of prednisone for a presumed sinus infection after her COVID-19 illness.  The patient reports no improvement of her symptoms after taking the oral steroids.    PMH:  has no past medical history of Breast cancer (HCC).  MEDS:   Current Outpatient Medications:   •  Acetaminophen (TYLENOL) 325 MG Cap, Tylenol, Disp: , Rfl:   •  benzonatate (TESSALON) 100 MG Cap, Take 1 capsule by mouth 3 times a day as needed for Cough., Disp: 60 capsule, Rfl: 0  •  XIIDRA 5 % Solution, INSTILL 1 DROP IN OU BID, Disp: , Rfl: 3  •  ibuprofen (MOTRIN) 800 MG Tab, Take 1 Tab by mouth every 8 hours as needed., Disp: 30 Tab, Rfl: 0  •  cyclosporin (RESTASIS) 0.05 % ophthalmic emulsion, Place 1 Drop in both eyes 2 times a day., Disp: , Rfl:   •  Omega-3 Fatty Acids (OMEGA 3 PO), Take  by mouth., Disp: , Rfl:   •  predniSONE (DELTASONE) 20 MG Tab, TAKE 2 TABLETS BY MOUTH DAILY FOR 5 DAYS (Patient not taking: Reported on 2/8/2022), Disp: , Rfl:   ALLERGIES:   Allergies   Allergen Reactions   • Lac Bovis    • Pcn [Penicillins]    • Sulfa Drugs      SURGHX:   Past Surgical History:    Procedure Laterality Date   • HYSTERECTOMY LAPAROSCOPY       SOCHX:  reports that she has never smoked. She has never used smokeless tobacco. She reports that she does not drink alcohol and does not use drugs.  FH: Family history was reviewed, no pertinent findings to report    Review of Systems   Constitutional: Negative for fever.   HENT: Positive for congestion, sinus pain and sore throat. Negative for ear pain.    Eyes: Negative for discharge and redness.   Respiratory: Positive for cough and shortness of breath (The patient reports intermittent shortness of breath secondary to coughing.).    Cardiovascular: Negative for chest pain and leg swelling.   Gastrointestinal: Positive for diarrhea (The patient states she developed diarrhea x 1 day ago.). Negative for nausea and vomiting.   Musculoskeletal: Negative for myalgias.   Skin: Negative for rash.   Neurological: Positive for headaches.        Objective   /82 (BP Location: Right arm, Patient Position: Sitting, BP Cuff Size: Adult)   Pulse 94   Temp 36.6 °C (97.9 °F) (Temporal)   Resp 20   Ht 1.524 m (5')   Wt 75.3 kg (166 lb)   SpO2 95%   BMI 32.42 kg/m²      Physical Exam  Constitutional:       General: She is not in acute distress.     Appearance: Normal appearance. She is not ill-appearing.   HENT:      Head: Normocephalic and atraumatic.      Right Ear: Tympanic membrane, ear canal and external ear normal.      Left Ear: Tympanic membrane, ear canal and external ear normal.      Nose: Mucosal edema present.      Right Turbinates: Swollen.      Left Turbinates: Swollen.      Mouth/Throat:      Mouth: Mucous membranes are moist.      Pharynx: Oropharynx is clear. Uvula midline. No posterior oropharyngeal erythema.      Tonsils: No tonsillar exudate.   Eyes:      Extraocular Movements: Extraocular movements intact.      Conjunctiva/sclera: Conjunctivae normal.   Cardiovascular:      Rate and Rhythm: Normal rate and regular rhythm.      Heart  sounds: Normal heart sounds.   Pulmonary:      Effort: Pulmonary effort is normal. No respiratory distress.      Breath sounds: Normal breath sounds. No wheezing.   Musculoskeletal:         General: Normal range of motion.      Cervical back: Normal range of motion and neck supple.   Skin:     General: Skin is warm and dry.   Neurological:      Mental Status: She is alert and oriented to person, place, and time.               Progress:   CXR:  COMPARISON:  None.     FINDINGS:  LUNGS: The lungs are clear.     HEART and MEDIASTINUM: normal in size.     Pleura: There are no pleural effusion or pneumothoraces.     Osseous structures: No significant bony abnormality.    IMPRESSION:  Negative two views of the chest.        Assessment & Plan      1. Respiratory tract infection  - doxycycline (VIBRAMYCIN) 100 MG Tab; Take 1 Tablet by mouth 2 times a day for 7 days.  Dispense: 14 Tablet; Refill: 0  - predniSONE (DELTASONE) 10 MG Tab; Take 1 Tablet by mouth every day for 5 days.  Dispense: 5 Tablet; Refill: 0    2. Cough  - DX-CHEST-2 VIEWS; Future  - benzonatate (TESSALON) 100 MG Cap; Take 1 Capsule by mouth 3 times a day as needed for Cough.  Dispense: 60 Capsule; Refill: 0    The patient's presenting symptoms and physical examinations are consistent with a respiratory tract infection with associated cough.  The patient has been experiencing continued respiratory symptoms since being diagnosed with COVID-19 in late December 2021.  The patient's physical exam today in clinic was normal.  The patient's lungs are clear to auscultation without wheezing, and her pulse ox was within normal limits.  The patient appears in no acute distress.  Patient's vital signs are stable and within normal limits.  She is afebrile today in clinic.  A chest x-ray is obtained to further evaluate the patient's ongoing symptoms.  The patient's chest x-ray was negative.  Given the prolonged duration of the patient's symptoms I am concerned about a  possible secondary bacterial infection.  Will prescribe the patient doxycycline for presumed bacterial infection.  We will also prescribe the patient a short course of prednisone for her ongoing symptoms.  Additionally, will prescribe patient Tessalon for symptomatic relief of her cough.  Advised the patient to monitor worsening signs and or symptoms.  Recommend OTC medications and supportive care for symptomatic management.  Recommend patient follow-up with her PCP as needed.  Discussed return precautions with the patient, and she verbalized understanding.    Differential diagnoses, supportive care, and indications for immediate follow-up discussed with patient.   Instructed to return to clinic or nearest emergency department for any change in condition, further concerns, or worsening of symptoms.    OTC Tylenol or Motrin for fever/discomfort.  OTC cough/cold medication for symptomatic relief  OTC antihistamines for symptomatic relief  OTC Flonase for symptomatic relief  OTC Supportive Care for Congestion - saline nasal spray or neti pot  Drink plenty of fluids  Follow-up with PCP  Return to clinic or go to the ED if symptoms worsen or fail to improve, or if the patient should develop worsening/increasing cough, congestion, ear pain, sore throat, shortness of breath, wheezing, chest pain, fever/chills, and/or any concerning symptoms.    Discussed plan with the patient, and she agrees to the above.     I personally reviewed prior external notes and test results pertinent to today's visit.  I have independently reviewed and interpreted all diagnostics ordered during this urgent care visit.     Time spent evaluating this patient was at least 30 minutes and includes preparing for visit, obtaining history, exam and evaluation, ordering labs/tests/procedures/medications, independent interpretation, and counseling/education.    Please note that this dictation was created using voice recognition software. I have made every  reasonable attempt to correct obvious errors, but I expect that there may be errors of grammar and possibly content that I did not discover before finalizing the note.     This note was electronically signed by Vilma Munoz PA-C

## 2022-02-23 ENCOUNTER — OFFICE VISIT (OUTPATIENT)
Dept: MEDICAL GROUP | Facility: CLINIC | Age: 62
End: 2022-02-23
Payer: COMMERCIAL

## 2022-02-23 VITALS
WEIGHT: 168 LBS | SYSTOLIC BLOOD PRESSURE: 138 MMHG | DIASTOLIC BLOOD PRESSURE: 80 MMHG | HEIGHT: 60 IN | TEMPERATURE: 98.6 F | BODY MASS INDEX: 32.98 KG/M2 | OXYGEN SATURATION: 96 % | HEART RATE: 67 BPM

## 2022-02-23 DIAGNOSIS — U09.9 LONG COVID: ICD-10-CM

## 2022-02-23 DIAGNOSIS — J32.9 RECURRENT SINUSITIS: ICD-10-CM

## 2022-02-23 DIAGNOSIS — F41.9 ANXIETY: ICD-10-CM

## 2022-02-23 DIAGNOSIS — R53.83 FATIGUE, UNSPECIFIED TYPE: ICD-10-CM

## 2022-02-23 DIAGNOSIS — Z00.00 PREVENTATIVE HEALTH CARE: ICD-10-CM

## 2022-02-23 PROCEDURE — 99396 PREV VISIT EST AGE 40-64: CPT

## 2022-02-23 PROCEDURE — 99215 OFFICE O/P EST HI 40 MIN: CPT | Mod: GC,25

## 2022-02-23 RX ORDER — FLUTICASONE PROPIONATE 50 MCG
1 SPRAY, SUSPENSION (ML) NASAL DAILY
Qty: 16 G | Refills: 0 | Status: SHIPPED | OUTPATIENT
Start: 2022-02-23 | End: 2022-02-24

## 2022-02-23 ASSESSMENT — PATIENT HEALTH QUESTIONNAIRE - PHQ9
5. POOR APPETITE OR OVEREATING: 1 - SEVERAL DAYS
CLINICAL INTERPRETATION OF PHQ2 SCORE: 2
SUM OF ALL RESPONSES TO PHQ QUESTIONS 1-9: 7

## 2022-02-24 RX ORDER — FLUTICASONE PROPIONATE 50 MCG
SPRAY, SUSPENSION (ML) NASAL
Qty: 48 G | Refills: 4 | Status: SHIPPED | OUTPATIENT
Start: 2022-02-24 | End: 2022-07-14

## 2022-02-24 NOTE — PROGRESS NOTES
Subjective:     CC: Long covid, preventative    HPI:   Lauren presents today with    Problem   Long Covid   Preventative Health Care    Last colonoscopy 5-7 years ago in 2015. Need to look up.    Last mammo 2017.    No Hep C or HIV since childbirth    Last A1C in 2018 of 6.1%     Recurrent Sinusitis       Health Maintenance: Completed    ROS:  Review of Systems   All other systems reviewed and are negative.      Objective:     Exam:  /80 (BP Location: Right arm, Patient Position: Sitting)   Pulse 67   Temp 37 °C (98.6 °F)   Ht 1.524 m (5')   Wt 76.2 kg (168 lb)   SpO2 96%   BMI 32.81 kg/m²  Body mass index is 32.81 kg/m².    Physical Exam  Vitals reviewed.   Constitutional:       Appearance: Normal appearance.   HENT:      Head: Normocephalic and atraumatic.      Right Ear: External ear normal.      Left Ear: External ear normal.      Nose: Nose normal.      Mouth/Throat:      Mouth: Mucous membranes are moist.      Pharynx: Oropharynx is clear.   Eyes:      Extraocular Movements: Extraocular movements intact.      Conjunctiva/sclera: Conjunctivae normal.      Pupils: Pupils are equal, round, and reactive to light.   Cardiovascular:      Rate and Rhythm: Normal rate and regular rhythm.      Pulses: Normal pulses.      Heart sounds: Normal heart sounds.   Pulmonary:      Effort: Pulmonary effort is normal.      Breath sounds: Normal breath sounds.   Abdominal:      General: Abdomen is flat.      Palpations: Abdomen is soft.   Musculoskeletal:         General: Normal range of motion.      Cervical back: Normal range of motion.   Skin:     General: Skin is warm and dry.   Neurological:      General: No focal deficit present.      Mental Status: She is alert and oriented to person, place, and time. Mental status is at baseline.   Psychiatric:         Mood and Affect: Mood normal.         Behavior: Behavior normal.         Thought Content: Thought content normal.         Judgment: Judgment normal.         A  chaperone was offered to the patient during today's exam. Patient declined chaperone.    Labs: Ordered    Assessment & Plan:     61 y.o. female with the following -     Problem List Items Addressed This Visit     Long COVID     Still doesn't have taste and smell back. Had covid back in December 2021.  Still has extensive coughing that is slowly improving. Recurrent sinus infections.  He is also reporting some fatigue that started with Covid and has not gotten better.    We discussed long Covid and that this is likely 8 given that her symptoms started right around the same time her Covid infection happened she has not yet looked up on Covid support groups but is going to.  I have ordered a number of labs to look for other causes of possible fatigue such as thyroid and CBC.  Also patient is under a lot of stress at home given that her son is living there has he is getting  with his kids.  This coincided with the Covid infection as well.  I have placed a referral for therapy given the stress that she is under.  She is amenable to this.         Preventative health care     Last preventative healthcare visit was almost 4 years ago.  At that time she was notable for being prediabetic.  Did not go on any medications at that time instead opting for dietary and lifestyle changes.      Screening labs ordered  A1c, TSH, vitamin D level, CMP, CBC, lipids    Imaging ordered:    -Mammogram    Patient believes that her last colonoscopy was 5 years ago and that they said she should follow-up at the [facility]year [facility].  I am unable to find this record at this point in time.  I will look into it further for next follow-up visit to discuss whether or not he needs a colonoscopy.         Relevant Orders    MA-SCREENING MAMMO BILAT W/CAD    HEP C VIRUS ANTIBODY    HIV AG/AB COMBO ASSAY SCREENING    Lipid Profile    Recurrent sinusitis     Patient has been reporting recurrent sinusitis.  Has been to the urgent care multiple times for  this and has received antibiotics.  She states that this is been going on since Covid.  She has not been taking any allergy medicines aside from Aller aid her chiropractor gave her.    Currently she does not have any symptoms.    Flonase 5 days a week with 2 days rest each week.  Daily allergy med i.e. Claritin  Afrin for 48 hours only           Other Visit Diagnoses     Anxiety        Relevant Orders    Referral to Psychology    Fatigue, unspecified type        Relevant Orders    TSH WITH REFLEX TO FT4    CBC WITH DIFFERENTIAL    Comp Metabolic Panel    VITAMIN D,25 HYDROXY          I spent a total of 40 minutes with record review, exam, communication with the patient, communication with other providers, and documentation of this encounter.      Discussed with the patient that she is to follow-up with me in 3 months in order to give her time to get her labs and mammogram done so we can discuss them.  If her diabetes labs come back more elevated we can schedule a follow-up sooner.    No follow-ups on file.    Please note that this dictation was created using voice recognition software. I have made every reasonable attempt to correct obvious errors, but I expect that there are errors of grammar and possibly content that I did not discover before finalizing the note.

## 2022-02-24 NOTE — ASSESSMENT & PLAN NOTE
Last preventative healthcare visit was almost 4 years ago.  At that time she was notable for being prediabetic.  Did not go on any medications at that time instead opting for dietary and lifestyle changes.      Screening labs ordered  A1c, TSH, vitamin D level, CMP, CBC, lipids    Imaging ordered:    -Mammogram    Patient believes that her last colonoscopy was 5 years ago and that they said she should follow-up at the [facility]-year [facility].  I am unable to find this record at this point in time.  I will look into it further for next follow-up visit to discuss whether or not he needs a colonoscopy.

## 2022-02-24 NOTE — ASSESSMENT & PLAN NOTE
Patient has been reporting recurrent sinusitis.  Has been to the urgent care multiple times for this and has received antibiotics.  She states that this is been going on since Covid.  She has not been taking any allergy medicines aside from Aller aid her chiropractor gave her.    Currently she does not have any symptoms.    Flonase 5 days a week with 2 days rest each week.  Daily allergy med i.e. Claritin  Afrin for 48 hours only

## 2022-02-24 NOTE — ASSESSMENT & PLAN NOTE
Still doesn't have taste and smell back. Had covid back in December 2021.  Still has extensive coughing that is slowly improving. Recurrent sinus infections.  He is also reporting some fatigue that started with Covid and has not gotten better.    We discussed long Covid and that this is likely 8 given that her symptoms started right around the same time her Covid infection happened she has not yet looked up on Covid support groups but is going to.  I have ordered a number of labs to look for other causes of possible fatigue such as thyroid and CBC.  Also patient is under a lot of stress at home given that her son is living there has he is getting  with his kids.  This coincided with the Covid infection as well.  I have placed a referral for therapy given the stress that she is under.  She is amenable to this.

## 2022-02-25 ENCOUNTER — HOSPITAL ENCOUNTER (OUTPATIENT)
Dept: LAB | Facility: MEDICAL CENTER | Age: 62
End: 2022-02-25
Payer: COMMERCIAL

## 2022-02-25 DIAGNOSIS — R53.83 FATIGUE, UNSPECIFIED TYPE: ICD-10-CM

## 2022-02-25 DIAGNOSIS — Z00.00 PREVENTATIVE HEALTH CARE: ICD-10-CM

## 2022-02-25 LAB
25(OH)D3 SERPL-MCNC: 24 NG/ML (ref 30–100)
ALBUMIN SERPL BCP-MCNC: 4.3 G/DL (ref 3.2–4.9)
ALBUMIN/GLOB SERPL: 1.3 G/DL
ALP SERPL-CCNC: 106 U/L (ref 30–99)
ALT SERPL-CCNC: 12 U/L (ref 2–50)
ANION GAP SERPL CALC-SCNC: 12 MMOL/L (ref 7–16)
AST SERPL-CCNC: 20 U/L (ref 12–45)
BASOPHILS # BLD AUTO: 0.8 % (ref 0–1.8)
BASOPHILS # BLD: 0.07 K/UL (ref 0–0.12)
BILIRUB SERPL-MCNC: 0.4 MG/DL (ref 0.1–1.5)
BUN SERPL-MCNC: 13 MG/DL (ref 8–22)
CALCIUM SERPL-MCNC: 9.5 MG/DL (ref 8.5–10.5)
CHLORIDE SERPL-SCNC: 104 MMOL/L (ref 96–112)
CHOLEST SERPL-MCNC: 185 MG/DL (ref 100–199)
CO2 SERPL-SCNC: 25 MMOL/L (ref 20–33)
CREAT SERPL-MCNC: 0.51 MG/DL (ref 0.5–1.4)
EOSINOPHIL # BLD AUTO: 0.23 K/UL (ref 0–0.51)
EOSINOPHIL NFR BLD: 2.7 % (ref 0–6.9)
ERYTHROCYTE [DISTWIDTH] IN BLOOD BY AUTOMATED COUNT: 49 FL (ref 35.9–50)
FASTING STATUS PATIENT QL REPORTED: NORMAL
GLOBULIN SER CALC-MCNC: 3.3 G/DL (ref 1.9–3.5)
GLUCOSE SERPL-MCNC: 107 MG/DL (ref 65–99)
HCT VFR BLD AUTO: 45.3 % (ref 37–47)
HCV AB SER QL: NORMAL
HDLC SERPL-MCNC: 51 MG/DL
HGB BLD-MCNC: 14.7 G/DL (ref 12–16)
HIV 1+2 AB+HIV1 P24 AG SERPL QL IA: NORMAL
IMM GRANULOCYTES # BLD AUTO: 0.04 K/UL (ref 0–0.11)
IMM GRANULOCYTES NFR BLD AUTO: 0.5 % (ref 0–0.9)
LDLC SERPL CALC-MCNC: 108 MG/DL
LYMPHOCYTES # BLD AUTO: 1.87 K/UL (ref 1–4.8)
LYMPHOCYTES NFR BLD: 22 % (ref 22–41)
MCH RBC QN AUTO: 29.6 PG (ref 27–33)
MCHC RBC AUTO-ENTMCNC: 32.5 G/DL (ref 33.6–35)
MCV RBC AUTO: 91.3 FL (ref 81.4–97.8)
MONOCYTES # BLD AUTO: 0.49 K/UL (ref 0–0.85)
MONOCYTES NFR BLD AUTO: 5.8 % (ref 0–13.4)
NEUTROPHILS # BLD AUTO: 5.8 K/UL (ref 2–7.15)
NEUTROPHILS NFR BLD: 68.2 % (ref 44–72)
NRBC # BLD AUTO: 0 K/UL
NRBC BLD-RTO: 0 /100 WBC
PLATELET # BLD AUTO: 304 K/UL (ref 164–446)
PMV BLD AUTO: 11 FL (ref 9–12.9)
POTASSIUM SERPL-SCNC: 4.4 MMOL/L (ref 3.6–5.5)
PROT SERPL-MCNC: 7.6 G/DL (ref 6–8.2)
RBC # BLD AUTO: 4.96 M/UL (ref 4.2–5.4)
SODIUM SERPL-SCNC: 141 MMOL/L (ref 135–145)
TRIGL SERPL-MCNC: 130 MG/DL (ref 0–149)
TSH SERPL DL<=0.005 MIU/L-ACNC: 1.47 UIU/ML (ref 0.38–5.33)
WBC # BLD AUTO: 8.5 K/UL (ref 4.8–10.8)

## 2022-02-25 PROCEDURE — 87389 HIV-1 AG W/HIV-1&-2 AB AG IA: CPT

## 2022-02-25 PROCEDURE — 82306 VITAMIN D 25 HYDROXY: CPT

## 2022-02-25 PROCEDURE — 84443 ASSAY THYROID STIM HORMONE: CPT

## 2022-02-25 PROCEDURE — 85025 COMPLETE CBC W/AUTO DIFF WBC: CPT

## 2022-02-25 PROCEDURE — 80053 COMPREHEN METABOLIC PANEL: CPT

## 2022-02-25 PROCEDURE — 80061 LIPID PANEL: CPT

## 2022-02-25 PROCEDURE — 36415 COLL VENOUS BLD VENIPUNCTURE: CPT

## 2022-02-25 PROCEDURE — 86803 HEPATITIS C AB TEST: CPT

## 2022-03-08 ENCOUNTER — HOSPITAL ENCOUNTER (OUTPATIENT)
Dept: RADIOLOGY | Facility: MEDICAL CENTER | Age: 62
End: 2022-03-08
Payer: COMMERCIAL

## 2022-03-08 DIAGNOSIS — Z12.31 VISIT FOR SCREENING MAMMOGRAM: ICD-10-CM

## 2022-03-08 PROCEDURE — 77063 BREAST TOMOSYNTHESIS BI: CPT

## 2022-07-14 ENCOUNTER — OFFICE VISIT (OUTPATIENT)
Dept: URGENT CARE | Facility: PHYSICIAN GROUP | Age: 62
End: 2022-07-14
Payer: COMMERCIAL

## 2022-07-14 ENCOUNTER — HOSPITAL ENCOUNTER (OUTPATIENT)
Facility: MEDICAL CENTER | Age: 62
End: 2022-07-14
Attending: NURSE PRACTITIONER
Payer: COMMERCIAL

## 2022-07-14 VITALS
WEIGHT: 168.2 LBS | HEART RATE: 76 BPM | DIASTOLIC BLOOD PRESSURE: 80 MMHG | BODY MASS INDEX: 33.02 KG/M2 | TEMPERATURE: 97.3 F | HEIGHT: 60 IN | RESPIRATION RATE: 16 BRPM | SYSTOLIC BLOOD PRESSURE: 128 MMHG | OXYGEN SATURATION: 95 %

## 2022-07-14 DIAGNOSIS — R05.9 COUGH IN ADULT: ICD-10-CM

## 2022-07-14 DIAGNOSIS — R68.89 FLU-LIKE SYMPTOMS: ICD-10-CM

## 2022-07-14 DIAGNOSIS — Z20.822 SUSPECTED COVID-19 VIRUS INFECTION: ICD-10-CM

## 2022-07-14 DIAGNOSIS — J02.9 PHARYNGITIS, UNSPECIFIED ETIOLOGY: ICD-10-CM

## 2022-07-14 PROBLEM — M62.50 MUSCLE ATROPHY: Status: ACTIVE | Noted: 2019-06-04

## 2022-07-14 PROBLEM — M25.551 HIP PAIN, RIGHT: Status: ACTIVE | Noted: 2021-02-10

## 2022-07-14 PROBLEM — I10 HTN (HYPERTENSION): Status: ACTIVE | Noted: 2022-07-14

## 2022-07-14 PROBLEM — R19.7 ACUTE DIARRHEA: Status: ACTIVE | Noted: 2018-11-19

## 2022-07-14 PROBLEM — M25.579 ARTHRALGIA OF ANKLE: Status: ACTIVE | Noted: 2017-07-07

## 2022-07-14 PROBLEM — Z90.710 ACQUIRED ABSENCE OF BOTH CERVIX AND UTERUS: Status: ACTIVE | Noted: 2022-07-14

## 2022-07-14 PROBLEM — N64.4 BREAST PAIN, RIGHT: Status: ACTIVE | Noted: 2020-02-25

## 2022-07-14 PROBLEM — K57.90 DIVERTICULOSIS: Status: ACTIVE | Noted: 2022-07-14

## 2022-07-14 PROBLEM — L60.9 NAIL ABNORMALITY: Status: ACTIVE | Noted: 2020-02-25

## 2022-07-14 PROBLEM — H91.90 HEARING LOSS: Status: ACTIVE | Noted: 2022-07-14

## 2022-07-14 PROBLEM — E55.9 VITAMIN D DEFICIENCY: Status: ACTIVE | Noted: 2017-08-03

## 2022-07-14 PROBLEM — E66.3 OVERWEIGHT WITH BODY MASS INDEX (BMI) 25.0-29.9: Status: ACTIVE | Noted: 2017-07-05

## 2022-07-14 PROBLEM — R51.9 HEADACHE: Status: ACTIVE | Noted: 2018-11-05

## 2022-07-14 PROBLEM — R74.8 HIGH ALKALINE PHOSPHATASE: Status: ACTIVE | Noted: 2017-08-03

## 2022-07-14 PROBLEM — L83 ACANTHOSIS NIGRICANS: Status: ACTIVE | Noted: 2017-07-05

## 2022-07-14 PROBLEM — B02.9 SHINGLES: Status: ACTIVE | Noted: 2022-07-14

## 2022-07-14 PROBLEM — R06.02 SHORTNESS OF BREATH: Status: ACTIVE | Noted: 2018-11-05

## 2022-07-14 PROBLEM — N95.2 VAGINAL ATROPHY: Status: ACTIVE | Noted: 2017-07-05

## 2022-07-14 PROBLEM — R07.81 RIB PAIN: Status: ACTIVE | Noted: 2021-02-10

## 2022-07-14 PROBLEM — J01.10 ACUTE FRONTAL SINUSITIS: Status: ACTIVE | Noted: 2021-08-27

## 2022-07-14 PROBLEM — R73.02 IMPAIRED GLUCOSE TOLERANCE: Status: ACTIVE | Noted: 2017-08-03

## 2022-07-14 PROBLEM — M79.671 FOOT PAIN, RIGHT: Status: ACTIVE | Noted: 2017-07-05

## 2022-07-14 PROBLEM — M79.7 FIBROMYALGIA: Status: ACTIVE | Noted: 2022-07-14

## 2022-07-14 PROBLEM — M25.50 PAIN IN UNSPECIFIED JOINT: Status: ACTIVE | Noted: 2022-07-14

## 2022-07-14 PROBLEM — M72.2 PLANTAR FASCIITIS, BILATERAL: Status: ACTIVE | Noted: 2017-07-07

## 2022-07-14 PROBLEM — N39.3 STRESS INCONTINENCE: Status: ACTIVE | Noted: 2019-02-15

## 2022-07-14 PROBLEM — R53.83 FATIGUE: Status: ACTIVE | Noted: 2018-11-05

## 2022-07-14 LAB
EXTERNAL QUALITY CONTROL: NORMAL
INT CON NEG: NEGATIVE
INT CON POS: POSITIVE
S PYO AG THROAT QL: NEGATIVE
SARS-COV+SARS-COV-2 AG RESP QL IA.RAPID: NEGATIVE

## 2022-07-14 PROCEDURE — 99214 OFFICE O/P EST MOD 30 MIN: CPT | Mod: CS | Performed by: NURSE PRACTITIONER

## 2022-07-14 PROCEDURE — 87880 STREP A ASSAY W/OPTIC: CPT | Performed by: NURSE PRACTITIONER

## 2022-07-14 PROCEDURE — 87426 SARSCOV CORONAVIRUS AG IA: CPT | Performed by: NURSE PRACTITIONER

## 2022-07-14 PROCEDURE — 0240U HCHG SARS-COV-2 COVID-19 NFCT DS RESP RNA 3 TRGT MIC: CPT

## 2022-07-14 RX ORDER — DEXAMETHASONE SODIUM PHOSPHATE 10 MG/ML
10 INJECTION INTRAMUSCULAR; INTRAVENOUS ONCE
Status: COMPLETED | OUTPATIENT
Start: 2022-07-14 | End: 2022-07-14

## 2022-07-14 RX ORDER — CODEINE PHOSPHATE AND GUAIFENESIN 10; 100 MG/5ML; MG/5ML
5 SOLUTION ORAL EVERY 4 HOURS PRN
Qty: 100 ML | Refills: 0 | Status: SHIPPED | OUTPATIENT
Start: 2022-07-14 | End: 2022-07-19

## 2022-07-14 RX ADMIN — DEXAMETHASONE SODIUM PHOSPHATE 10 MG: 10 INJECTION INTRAMUSCULAR; INTRAVENOUS at 18:12

## 2022-07-14 ASSESSMENT — ENCOUNTER SYMPTOMS
SPUTUM PRODUCTION: 0
SHORTNESS OF BREATH: 0
HEMOPTYSIS: 0
VOMITING: 0
ABDOMINAL PAIN: 0
NAUSEA: 1
HEADACHES: 1
WHEEZING: 0
COUGH: 1
SORE THROAT: 1
SWOLLEN GLANDS: 1
DIARRHEA: 0
TROUBLE SWALLOWING: 1
CHILLS: 0
FEVER: 0

## 2022-07-14 ASSESSMENT — FIBROSIS 4 INDEX: FIB4 SCORE: 1.22

## 2022-07-15 NOTE — PROGRESS NOTES
Subjective:     Lauren Mandel is a 62 y.o. female who presents for Pharyngitis (Sore throat and cough started yesterday. Throat feels like it's on fire. No fever)      Pharyngitis   This is a new problem. The current episode started in the past 7 days (Lauren is a pleasant 62 year old male who presents to  today with complaints of a sore throat and cough that started 3 days ago). The problem has been unchanged. There has been no fever. The pain is at a severity of 8/10. Associated symptoms include congestion, coughing, ear pain, headaches, swollen glands and trouble swallowing. Pertinent negatives include no abdominal pain, diarrhea, shortness of breath or vomiting. She has tried NSAIDs for the symptoms. The treatment provided mild relief.         Review of Systems   Constitutional: Positive for malaise/fatigue. Negative for chills and fever.   HENT: Positive for congestion, ear pain, sore throat and trouble swallowing.    Respiratory: Positive for cough. Negative for hemoptysis, sputum production, shortness of breath and wheezing.    Gastrointestinal: Positive for nausea. Negative for abdominal pain, diarrhea and vomiting.   Neurological: Positive for headaches.       PMH: No past medical history on file.  ALLERGIES:   Allergies   Allergen Reactions   • Lac Bovis    • Pcn [Penicillins]    • Sulfa Drugs      SURGHX:   Past Surgical History:   Procedure Laterality Date   • HYSTERECTOMY LAPAROSCOPY       SOCHX:   Social History     Socioeconomic History   • Marital status:    Tobacco Use   • Smoking status: Never Smoker   • Smokeless tobacco: Never Used   Vaping Use   • Vaping Use: Never used   Substance and Sexual Activity   • Alcohol use: No   • Drug use: No     FH: No family history on file.      Objective:   /80 (BP Location: Left arm, Patient Position: Sitting, BP Cuff Size: Adult)   Pulse 76   Temp 36.3 °C (97.3 °F) (Temporal)   Resp 16   Ht 1.524 m (5')   Wt 76.3 kg (168 lb 3.2 oz)    SpO2 95%   BMI 32.85 kg/m²     Physical Exam  Vitals and nursing note reviewed.   Constitutional:       General: She is not in acute distress.     Appearance: Normal appearance. She is normal weight. She is ill-appearing.   HENT:      Head: Normocephalic and atraumatic.      Right Ear: Tympanic membrane, ear canal and external ear normal. There is no impacted cerumen.      Left Ear: Tympanic membrane, ear canal and external ear normal. There is no impacted cerumen.      Nose: Congestion present. No rhinorrhea.      Mouth/Throat:      Mouth: Mucous membranes are moist.      Pharynx: Posterior oropharyngeal erythema present. No oropharyngeal exudate.   Eyes:      Extraocular Movements: Extraocular movements intact.      Pupils: Pupils are equal, round, and reactive to light.   Cardiovascular:      Rate and Rhythm: Normal rate and regular rhythm.      Pulses: Normal pulses.      Heart sounds: Normal heart sounds.   Pulmonary:      Effort: Pulmonary effort is normal. No respiratory distress.      Breath sounds: Normal breath sounds. No stridor. No wheezing, rhonchi or rales.   Chest:      Chest wall: No tenderness.   Abdominal:      General: Abdomen is flat. Bowel sounds are normal.      Palpations: Abdomen is soft.      Tenderness: There is no abdominal tenderness. There is no right CVA tenderness or left CVA tenderness.   Musculoskeletal:         General: Normal range of motion.      Cervical back: Normal range of motion and neck supple. Tenderness present.   Lymphadenopathy:      Cervical: No cervical adenopathy.   Skin:     General: Skin is warm and dry.      Capillary Refill: Capillary refill takes less than 2 seconds.   Neurological:      General: No focal deficit present.      Mental Status: She is alert and oriented to person, place, and time. Mental status is at baseline.   Psychiatric:         Mood and Affect: Mood normal.         Behavior: Behavior normal.         Thought Content: Thought content normal.          Judgment: Judgment normal.       poct strep: negative  poct covid: negative  Assessment/Plan:   Assessment    1. Suspected COVID-19 virus infection  POCT SARS-COV Antigen TARIQ (Symptomatic only)    CoV-2 and Flu A/B by PCR (24 hour In-House): Collect NP swab in VTM   2. Pharyngitis, unspecified etiology  POCT Rapid Strep A    dexamethasone (DECADRON) injection (check route below) 10 mg   3. Cough in adult  guaifenesin-codeine (ROBITUSSIN AC) Solution oral solution   4. Flu-like symptoms  CoV-2 and Flu A/B by PCR (24 hour In-House): Collect NP swab in VTM    guaifenesin-codeine (ROBITUSSIN AC) Solution oral solution      reviewed and I do not believe she is at an increased risk for abuse. Side effect of codeine discussed with pt. PCR testing for FLU and COVID.   We discussed supportive measures including humidifier, warm salt water gargles, over-the-counter Cepacol throat lozenges, rest  and increased fluids. Pt was encouraged to seek treatment back in the ER or urgent care for worsening symptoms,  fever greater than 100.5, wheezes or shortness of breath.    AVS handout given and reviewed with patient. Pt educated on red flags and when to seek treatment back in ER or UC.

## 2022-07-16 DIAGNOSIS — Z20.822 SUSPECTED COVID-19 VIRUS INFECTION: ICD-10-CM

## 2022-07-16 DIAGNOSIS — R68.89 FLU-LIKE SYMPTOMS: ICD-10-CM

## 2022-07-16 LAB
FLUAV RNA SPEC QL NAA+PROBE: NEGATIVE
FLUBV RNA SPEC QL NAA+PROBE: NEGATIVE
SARS-COV-2 RNA RESP QL NAA+PROBE: NOTDETECTED
SPECIMEN SOURCE: NORMAL

## 2022-08-03 ENCOUNTER — OFFICE VISIT (OUTPATIENT)
Dept: URGENT CARE | Facility: PHYSICIAN GROUP | Age: 62
End: 2022-08-03
Payer: COMMERCIAL

## 2022-08-03 VITALS
HEART RATE: 91 BPM | OXYGEN SATURATION: 94 % | RESPIRATION RATE: 16 BRPM | BODY MASS INDEX: 32.98 KG/M2 | HEIGHT: 60 IN | TEMPERATURE: 97.4 F | SYSTOLIC BLOOD PRESSURE: 130 MMHG | WEIGHT: 168 LBS | DIASTOLIC BLOOD PRESSURE: 82 MMHG

## 2022-08-03 DIAGNOSIS — G51.0 BELL'S PALSY: ICD-10-CM

## 2022-08-03 DIAGNOSIS — L50.9 HIVES: ICD-10-CM

## 2022-08-03 DIAGNOSIS — T78.40XA ALLERGIC REACTION, INITIAL ENCOUNTER: ICD-10-CM

## 2022-08-03 PROCEDURE — 99213 OFFICE O/P EST LOW 20 MIN: CPT | Performed by: NURSE PRACTITIONER

## 2022-08-03 RX ORDER — DEXAMETHASONE SODIUM PHOSPHATE 4 MG/ML
4 INJECTION, SOLUTION INTRA-ARTICULAR; INTRALESIONAL; INTRAMUSCULAR; INTRAVENOUS; SOFT TISSUE ONCE
Status: DISCONTINUED | OUTPATIENT
Start: 2022-08-03 | End: 2022-08-03

## 2022-08-03 RX ORDER — METHYLPREDNISOLONE 4 MG/1
TABLET ORAL
Qty: 21 TABLET | Refills: 0 | Status: SHIPPED | OUTPATIENT
Start: 2022-08-03 | End: 2023-02-06

## 2022-08-03 RX ORDER — DEXAMETHASONE SODIUM PHOSPHATE 10 MG/ML
4 INJECTION INTRAMUSCULAR; INTRAVENOUS ONCE
Status: COMPLETED | OUTPATIENT
Start: 2022-08-03 | End: 2022-08-03

## 2022-08-03 RX ADMIN — DEXAMETHASONE SODIUM PHOSPHATE 4 MG: 10 INJECTION INTRAMUSCULAR; INTRAVENOUS at 09:50

## 2022-08-03 ASSESSMENT — ENCOUNTER SYMPTOMS
HEADACHES: 0
ORTHOPNEA: 0
CHILLS: 0
PALPITATIONS: 0
VOMITING: 0
WHEEZING: 0
WEAKNESS: 0
TINGLING: 1
MYALGIAS: 0
SHORTNESS OF BREATH: 0
FEVER: 0
CONSTIPATION: 0
EYES NEGATIVE: 1
DIZZINESS: 0
SINUS PAIN: 0
NAUSEA: 0
DIARRHEA: 0
COUGH: 0
SORE THROAT: 1
SENSORY CHANGE: 1
ABDOMINAL PAIN: 0

## 2022-08-03 ASSESSMENT — FIBROSIS 4 INDEX: FIB4 SCORE: 1.22

## 2022-08-03 NOTE — PROGRESS NOTES
Subjective     Lauren Mandel is a 62 y.o. female who presents with Hives (Lip swollen/tingly, face is numb, x1 week, sore throat.)            HPI  States had gotten a few red bumps on feet, then hives started last week on arms and legs. Now has face and lip swelling as of yeserday. Took Benadryl 25 mg at night only x 1 week. Benadryl cream 3x/day. States had new sheets, but has washed them and her  does not have a rash. States started takinga vegan green powder supplement, but stopped this. No recent travel or camping. No swimming or lake visits. Urticaria. Has started to use witch hazel to rash which has helped. States feels like trouble with swallowing on left side of throat. Experiencing sore throat with this. History of dry eye and takes eye supplement x 1 month, this is not a new medication. Has food allergies, but has not been exposed to them a s she is very careful about this. Admits to high stress at home. No others at home have rash, states 9 others are at home.     PMH:  has no past medical history of Breast cancer (HCC).  MEDS:   Current Outpatient Medications:   •  XIIDRA 5 % Solution, , Disp: , Rfl: 3  •  benzonatate (TESSALON) 100 MG Cap, Take 1 Capsule by mouth 3 times a day as needed for Cough. (Patient not taking: No sig reported), Disp: 60 Capsule, Rfl: 0  •  fluticasone (FLONASE) 50 MCG/ACT nasal spray, INSTILL 1 SPRAY IN EACH NOSTRIL ONCE DAILY FOR 2 WEEKS (Patient not taking: No sig reported), Disp: , Rfl:   •  Acetaminophen (TYLENOL) 325 MG Cap, Tylenol (Patient not taking: Reported on 7/14/2022), Disp: , Rfl:   •  ibuprofen (MOTRIN) 800 MG Tab, Take 1 Tab by mouth every 8 hours as needed., Disp: 30 Tab, Rfl: 0  •  cyclosporin (RESTASIS) 0.05 % ophthalmic emulsion, Place 1 Drop in both eyes 2 times a day. (Patient not taking: Reported on 8/3/2022), Disp: , Rfl:   •  Omega-3 Fatty Acids (OMEGA 3 PO), Take  by mouth. (Patient not taking: Reported on 7/14/2022), Disp: , Rfl:   ALLERGIES:    Allergies   Allergen Reactions   • Lac Bovis    • Pcn [Penicillins]    • Sulfa Drugs      SURGHX:   Past Surgical History:   Procedure Laterality Date   • HYSTERECTOMY LAPAROSCOPY       SOCHX:  reports that she has never smoked. She has never used smokeless tobacco. She reports that she does not drink alcohol and does not use drugs.  FH: Family history was reviewed, no pertinent findings to report    Review of Systems   Constitutional: Negative for chills, fever and malaise/fatigue.   HENT: Positive for sore throat. Negative for congestion, ear pain and sinus pain.         Left sided facial swelling.    Eyes: Negative.    Respiratory: Negative for cough, shortness of breath and wheezing.    Cardiovascular: Negative for chest pain, palpitations and orthopnea.   Gastrointestinal: Negative for abdominal pain, constipation, diarrhea, nausea and vomiting.   Musculoskeletal: Negative for myalgias.   Skin: Positive for itching and rash.   Neurological: Positive for tingling and sensory change. Negative for dizziness, weakness and headaches.   Endo/Heme/Allergies: Negative for environmental allergies.   All other systems reviewed and are negative.             Objective     /82 (BP Location: Left arm, Patient Position: Sitting, BP Cuff Size: Adult)   Pulse 91   Temp 36.3 °C (97.4 °F) (Temporal)   Resp 16   Ht 1.524 m (5')   Wt 76.2 kg (168 lb)   SpO2 94%   BMI 32.81 kg/m²      Physical Exam  Vitals reviewed.   Constitutional:       General: She is awake. She is not in acute distress.     Appearance: Normal appearance. She is well-developed. She is not ill-appearing, toxic-appearing or diaphoretic.   HENT:      Head: Normocephalic.      Comments: No rash seen on head or neck regions.      Mouth/Throat:      Mouth: Mucous membranes are dry. No oral lesions or angioedema.      Pharynx: Oropharynx is clear. Uvula midline.        Comments: Left side lower lip swelling.  Eyes:      General: Lids are normal.       Extraocular Movements: Extraocular movements intact.      Conjunctiva/sclera: Conjunctivae normal.      Pupils: Pupils are equal, round, and reactive to light.   Cardiovascular:      Rate and Rhythm: Normal rate and regular rhythm.   Pulmonary:      Effort: Pulmonary effort is normal. No tachypnea, accessory muscle usage or respiratory distress.      Breath sounds: Normal breath sounds and air entry. No decreased breath sounds, wheezing, rhonchi or rales.   Musculoskeletal:         General: Normal range of motion.      Cervical back: Normal range of motion and neck supple.   Skin:     General: Skin is warm and dry.      Findings: Erythema and rash present. Rash is macular and urticarial.             Comments: Large hives on both upper arms and thigh regions, medial aspect.    Neurological:      Mental Status: She is alert and oriented to person, place, and time.      GCS: GCS eye subscore is 4. GCS verbal subscore is 5. GCS motor subscore is 6.      Cranial Nerves: Cranial nerves are intact.      Sensory: Sensation is intact.      Motor: Motor function is intact.      Coordination: Coordination is intact.      Gait: Gait is intact.      Comments: Mild left sided facial droop around mouth.    Psychiatric:         Attention and Perception: Attention normal.         Mood and Affect: Mood normal.         Speech: Speech normal.         Behavior: Behavior normal. Behavior is cooperative.         Thought Content: Thought content normal.         Cognition and Memory: Cognition and memory normal.         Judgment: Judgment normal.                             Assessment & Plan        1. Hives    - methylPREDNISolone (MEDROL DOSEPAK) 4 MG Tablet Therapy Pack; Follow schedule on package instructions.  Dispense: 21 Tablet; Refill: 0  - dexamethasone (DECADRON) injection (check route below) 4 mg    2. Allergic reaction, initial encounter    - methylPREDNISolone (MEDROL DOSEPAK) 4 MG Tablet Therapy Pack; Follow schedule on package  instructions.  Dispense: 21 Tablet; Refill: 0  - dexamethasone (DECADRON) injection (check route below) 4 mg    3. Bell's palsy    - methylPREDNISolone (MEDROL DOSEPAK) 4 MG Tablet Therapy Pack; Follow schedule on package instructions.  Dispense: 21 Tablet; Refill: 0  - dexamethasone (DECADRON) injection (check route below) 4 mg      Most likely stress induced Bell's Palsy but rash may be due to unknown allergic reaction  May continue Benadryl x 2 days for immediate relief of itchiness of rash  May use Zyrtec/Allegra or Claritin x 7 days for longer acting antihistamine  May use Pepcid daily x 7 days  May clean area with mild soap, do not scrub, wash with tepid water, pat dry  Apply over the counter hydrocortisone cream 1% twice daily to rash  Monitor for increase in rash size or areas affected, pain, itchiness, redness with blistering, fever, shortness of breath, difficulty with swallowing/breathing/sepaking- re-evaluate immediately, may need to call 911, patient understands this

## 2022-08-04 ENCOUNTER — OFFICE VISIT (OUTPATIENT)
Dept: MEDICAL GROUP | Facility: CLINIC | Age: 62
End: 2022-08-04
Payer: COMMERCIAL

## 2022-08-04 VITALS
BODY MASS INDEX: 32.73 KG/M2 | OXYGEN SATURATION: 98 % | HEIGHT: 60 IN | WEIGHT: 166.7 LBS | SYSTOLIC BLOOD PRESSURE: 124 MMHG | HEART RATE: 78 BPM | DIASTOLIC BLOOD PRESSURE: 80 MMHG

## 2022-08-04 DIAGNOSIS — Z00.00 PREVENTATIVE HEALTH CARE: ICD-10-CM

## 2022-08-04 DIAGNOSIS — U09.9 LONG COVID: ICD-10-CM

## 2022-08-04 DIAGNOSIS — L50.9 HIVES: ICD-10-CM

## 2022-08-04 LAB
HBA1C MFR BLD: 6 % (ref 0–5.6)
INT CON NEG: ABNORMAL
INT CON POS: ABNORMAL

## 2022-08-04 PROCEDURE — 83036 HEMOGLOBIN GLYCOSYLATED A1C: CPT

## 2022-08-04 PROCEDURE — 99214 OFFICE O/P EST MOD 30 MIN: CPT | Mod: GC

## 2022-08-04 RX ORDER — EPINEPHRINE 0.3 MG/.3ML
INJECTION SUBCUTANEOUS
Qty: 2 EACH | Refills: 0 | Status: SHIPPED | OUTPATIENT
Start: 2022-08-04

## 2022-08-04 ASSESSMENT — ENCOUNTER SYMPTOMS
CARDIOVASCULAR NEGATIVE: 1
SPUTUM PRODUCTION: 0
CONSTITUTIONAL NEGATIVE: 1
COUGH: 1
WHEEZING: 0
EYES NEGATIVE: 1
HEMOPTYSIS: 0
SHORTNESS OF BREATH: 0

## 2022-08-04 ASSESSMENT — FIBROSIS 4 INDEX: FIB4 SCORE: 1.22

## 2022-08-04 NOTE — PROGRESS NOTES
Subjective:     CC: Lab follow up.     HPI:   Lauren presents today with    Problem   Hives    Patient has had hives scattered across her body over the past 2 to 3 weeks increasing in frequency and size.  This culminated in a visit to an urgent care yesterday for swelling of her left face and lips.  She reports that the hives are itchy.  She has not changed any habits or added any new foods or soaps or detergents.  She is under a lot of stress.     Long Covid    Chronic cough since COVID.  X-ray in 2022 negative.  Cough constant.  Not worsening.  Lungs clear to auscultation on exam.     Preventative Health Care    - Last colonoscopy in 2012. Normal. Referral placed 2022.    - Mammogram 2022 with 1 yr follow up    - Complete hysterectomy with cervix removed as per patient.    - Hep C and HIV negative    - Last A1C in 2018 of 6.1%. 2022 pending.    - ACVSD risk score of 4.7%. in 2022. No statin recommended.    - Obesity.         Health Maintenance: Completed    ROS:  Review of Systems   Constitutional: Negative.    HENT: Negative.    Eyes: Negative.    Respiratory: Positive for cough. Negative for hemoptysis, sputum production, shortness of breath and wheezing.    Cardiovascular: Negative.    Skin: Negative.    All other systems reviewed and are negative.      Objective:     Exam:  /80 (BP Location: Right arm, Patient Position: Sitting, BP Cuff Size: Adult)   Pulse 78   Ht 1.524 m (5')   Wt 75.6 kg (166 lb 11.2 oz)   SpO2 98%   BMI 32.56 kg/m²  Body mass index is 32.56 kg/m².    Physical Exam  Vitals reviewed.   Constitutional:       Appearance: Normal appearance.   HENT:      Head: Normocephalic and atraumatic.      Right Ear: External ear normal.      Left Ear: External ear normal.      Nose: Nose normal.      Mouth/Throat:      Mouth: Mucous membranes are moist.      Pharynx: Oropharynx is clear.   Eyes:      Extraocular Movements: Extraocular movements intact.      Conjunctiva/sclera: Conjunctivae  normal.      Pupils: Pupils are equal, round, and reactive to light.   Cardiovascular:      Rate and Rhythm: Normal rate and regular rhythm.      Pulses: Normal pulses.      Heart sounds: Normal heart sounds.   Pulmonary:      Effort: Pulmonary effort is normal.      Breath sounds: Normal breath sounds.   Abdominal:      General: Abdomen is flat.      Palpations: Abdomen is soft.   Musculoskeletal:         General: Normal range of motion.      Cervical back: Normal range of motion.   Skin:     General: Skin is warm and dry.   Neurological:      General: No focal deficit present.      Mental Status: She is alert and oriented to person, place, and time. Mental status is at baseline.   Psychiatric:         Mood and Affect: Mood normal.         Behavior: Behavior normal.         Thought Content: Thought content normal.         Judgment: Judgment normal.         A chaperone was offered to the patient during today's exam. Patient declined chaperone.    Labs: Reviewed. A1c Of 6.0    Assessment & Plan:     62 y.o. female with the following -     Problem List Items Addressed This Visit     Long COVID     Patient's cough does not seem to be exacerbated by any particular thing.  He does not necessarily worsen with increased exercise, it is not associated with a specific location, time of year, or hot versus cold temperature.  I believe it is likely secondary to and residual from her COVID-19 infection 6-8 months ago given its onset during her infection and lingering symptoms after.  She does not have any peripheral edema, signs of fluid overload, evidence of inappropriate shortness of breath with exertion, chest pain, palpitations, weight loss, night sweats.    Given her presentation at this point in time I will continue to recommend symptomatic relief.  We will consider further work-up if cough worsens, other symptoms arise, or cough continues to be persistent.  Patient is in agreement with this plan.           Preventative  health care     Given the patient's prediabetic status and the ASCVD risk score 4.7 we discussed the possibility of going on a statin.  The patient was point time would prefer to lose weight.  She believes she can lose the 5 to 10% weight through exercise and diet in order to see the benefits and improvement in her prediabetic status and lipids.     The patient would like to follow-up in 2 to 3 months to discuss obesity.  She may qualify for a GLP-1.           Relevant Orders    POCT  A1C (Completed)    Referral to GI for Colonoscopy    Hives     Her presentation is concerning for an allergic reaction versus hives secondary to stress.  At this point time given her incredibly stressful home life and the minimal amount of swelling seen on photos from the patient's phone I am less concerned about allergies however it is so possibility.  The patient is currently on a steroid pack given to her from urgent care which I advised her to continue.  I will also prescribe her an EpiPen just in case this is an allergic type reaction and is getting worse.  The patient was counseled on ER precautions.  She is in agreement with this plan.                   No follow-ups on file.

## 2022-08-05 NOTE — ASSESSMENT & PLAN NOTE
Patient's cough does not seem to be exacerbated by any particular thing.  He does not necessarily worsen with increased exercise, it is not associated with a specific location, time of year, or hot versus cold temperature.  I believe it is likely secondary to and residual from her COVID-19 infection 6-8 months ago given its onset during her infection and lingering symptoms after.  She does not have any peripheral edema, signs of fluid overload, evidence of inappropriate shortness of breath with exertion, chest pain, palpitations, weight loss, night sweats.    Given her presentation at this point in time I will continue to recommend symptomatic relief.  We will consider further work-up if cough worsens, other symptoms arise, or cough continues to be persistent.  Patient is in agreement with this plan.

## 2022-08-05 NOTE — ASSESSMENT & PLAN NOTE
Given the patient's prediabetic status and the ASCVD risk score 4.7 we discussed the possibility of going on a statin.  The patient was point time would prefer to lose weight.  She believes she can lose the 5 to 10% weight through exercise and diet in order to see the benefits and improvement in her prediabetic status and lipids.     The patient would like to follow-up in 2 to 3 months to discuss obesity.  She may qualify for a GLP-1.

## 2022-08-05 NOTE — ASSESSMENT & PLAN NOTE
Her presentation is concerning for an allergic reaction versus hives secondary to stress.  At this point time given her incredibly stressful home life and the minimal amount of swelling seen on photos from the patient's phone I am less concerned about allergies however it is so possibility.  The patient is currently on a steroid pack given to her from urgent care which I advised her to continue.  I will also prescribe her an EpiPen just in case this is an allergic type reaction and is getting worse.  The patient was counseled on ER precautions.  She is in agreement with this plan.

## 2022-08-25 ENCOUNTER — OFFICE VISIT (OUTPATIENT)
Dept: BEHAVIORAL HEALTH | Facility: CLINIC | Age: 62
End: 2022-08-25
Payer: COMMERCIAL

## 2022-08-25 DIAGNOSIS — F32.0 CURRENT MILD EPISODE OF MAJOR DEPRESSIVE DISORDER WITHOUT PRIOR EPISODE (HCC): ICD-10-CM

## 2022-08-25 PROCEDURE — 90791 PSYCH DIAGNOSTIC EVALUATION: CPT | Performed by: MARRIAGE & FAMILY THERAPIST

## 2022-08-25 NOTE — PROGRESS NOTES
[facility]   Initial Assessment    Name: Lauren Mandel  MRN: [id number]  : 1960  Age: 62 y.o.  Date of assessment: 2022  PCP: Greg Lira M.D.  Persons in attendance: Patient  Total session time: 55 minutes      CHIEF COMPLAINT AND HISTORY OF PRESENTING PROBLEM:  (as stated by Patient):  Lauren Mandel is a 62 y.o., White female referred for assessment by Greg Lira M.D..  Primary presenting issue includes   Chief Complaint   Patient presents with    Depression    Initial  Evaluation   . The patient is a 62 year old  female who comes in to establish outpatient therapy services.  The patient reports having had a distant relationship with her own siblings she now sees that pattern with her children.  She reports this bothers her to a point where she has difficulty sleeping, lack of appetite, feeling sad, and feelings of inappropriate guilt. The patient reports blocks of time from elementary school and childhood memories.       BEHAVIORAL HEALTH TREATMENT HISTORY  Does patient/parent report a history of prior behavioral health treatment for patient? Yes:    Dates Level of Care Facilty/Provider Diagnosis/Problem Medications   8 Years ago OP  Bereavement                                                                     History of untreated behavioral health issues identified? No  Does patient/parent report change in appetite or weight loss/gain?  Eating less  Does patient/parent report physical pain?  Epiotics pain              Indicate if pain is acute or chronic, and location: Left side              Pain scale rating:           FAMILY/SOCIAL HISTORY  Current living situation/household members: The patient lives with her: , daughter, son in law and their two children, and every other week with her son and his three children in the patient's home.  Does patient/parent report a family history of behavioral health issues, diagnoses, or treatment?   History  reviewed. No pertinent family history.       EMPLOYMENT/RESOURCES  Is the patient currently employed? Yes  Does the patient/parent report adequate financial resources? Yes       HISTORY:  Does patient report current or past enlistment? No               [If yes, complete below items]  Does patient report history of exposure to combat? No      SPIRITUAL/CULTURAL/IDENTITY:  What are the patient's/family's spiritual beliefs or practices? Restorationist      ABUSE/NEGLECT/TRAUMA SCREENING  Does patient report feeling “unsafe” in his/her home, or afraid of anyone? No  Does patient report any history of physical, sexual, or emotional abuse?  Think so  Is there evidence of neglect by self? No  Is there evidence of neglect by a caregiver? No                                                                                                          SAFETY ASSESSMENT - SELF  Does patient acknowledge current or past symptoms of dangerousness to self? No  Recent change in frequency/specificity/intensity of suicidal thoughts or self-harm behavior? No  Current access to firearms, medications, or other identified means of suicide/self-harm? Yes  If yes, willing to restrict access to means of suicide/self-harm? Yes      Current Suicide Risk: Not applicable  Crisis Safety Plan completed and copy given to patient: No      SAFETY ASSESSMENT - OTHERS  Recent change in frequency/specificity/intensity of thoughts or threats to harm others? No  If Yes:  Current access to firearms/other identified means of harm?   If yes, willing to restrict access to weapons/means of harm?     Current Homicide Risk:  Not applicable  Crisis Safety Plan completed and copy given to patient? No  Based on information provided during the current assessment, is a mandated “duty to warn” being exercised? No      SUBSTANCE USE/ADDICTION HISTORY  Patient denies use of any substance/addictive behaviors No    If No:  Is there a family history of substance  "use/addiction? Yes  Does patient acknowledge or parent/significant other report use of/dependence on substances? No  Last time patient used alcohol: n/a  Within the past week? No  Last time patient used marijuana: n/a  Within the past month? No  Any other street drugs ever tried even once? No  Any use of prescription medications/pills without a prescription, or for reasons others than originally prescribed?  No  Any other addictive behavior reported (gambling, shopping, sex)? No     Drug History:  Amphetamine:      Cannibis:      Cocaine:      Ecstasy:      Hallucinogen:      Inhalant:       Opiate:      Other:      Sedative:           MENTAL STATUS/OBSERVATIONS              Participation: Active verbal participation, Attentive, and Engaged  Grooming: Casual  Orientation:Alert and Fully Oriented   Behavior: Calm  Eye contact: Good          Mood:Euthymic and sad  Affect:Flexible, Full range, Sad, and Tearful  Thought process: Logical and Goal-directed  Thought content:  Within normal limits  Speech: Rate within normal limits and Volume within normal limits  Perception: Within normal limits  Memory: No gross evidence of memory deficits and Remote:  Limited  Insight: Adequate  Judgment:  Adequate  Other:               Family/couple interaction observations: N/A      Patient's motivation/readiness for change: Childhood block of time missing, primarily elementary school. Forgetful, avfoidant on doing things,     Topics addressed in psychotherapy include: Discussed \"Have a New Kid by Friday\", [email or web address], self care and boundaries.    Care plan completed: No  Does patient express agreement with the above plan? Yes     Diagnosis:  1. Current mild episode of major depressive disorder without prior episode (HCC)        Referral appointment(s) scheduled? No       BRYANT Montero      "

## 2022-09-06 ENCOUNTER — OFFICE VISIT (OUTPATIENT)
Dept: BEHAVIORAL HEALTH | Facility: CLINIC | Age: 62
End: 2022-09-06
Payer: COMMERCIAL

## 2022-09-06 DIAGNOSIS — F32.0 CURRENT MILD EPISODE OF MAJOR DEPRESSIVE DISORDER WITHOUT PRIOR EPISODE (HCC): ICD-10-CM

## 2022-09-06 PROCEDURE — 90837 PSYTX W PT 60 MINUTES: CPT | Performed by: MARRIAGE & FAMILY THERAPIST

## 2022-09-09 NOTE — PROGRESS NOTES
[facility]   Therapy Progress Note        Name: Lauren Mandel  MRN: [id number]  : 1960  Age: 62 y.o.  Date of assessment: 2022  PCP: Greg Lira M.D.  Persons in attendance: Patient  Total session time: 55 minutes      Topics addressed in psychotherapy include: Met with the patient in office for an individual counseling session.  The patient discussed that she had a friend recently die and appears to have feelings regarding her own mortality.  She feels that she has lost friends and that she does not want to have a distant relationship with her children.  Worked with the patient on identifying means to repair those relationships.  Discussed her feelings of inappropriate guilt and the negative automatic thoughts which follow.    This dictation has been created using voice recognition software and/or scribes. The accuracy of the dictation is limited by the abilities of the software and the expertise of the scribes. I expect there may be some errors of grammar and possibly content. I made every attempt to manually correct the errors within my dictation. However, errors related to voice recognition software and/or scribes may still exist and should be interpreted within the appropriate context.    Objective Observations:   Participation:Active verbal participation, Attentive, and Engaged   Grooming:Adequate and Casual   Cognition:Alert and Fully Oriented   Eye Contact:Good   Mood:Anxious   Affect:Flexible and Full range   Thought Process:Logical and Goal-directed   Speech:Rate within normal limits and Volume within normal limits    Current Risk:   Suicide: low   Homicide: low   Self-Harm: low   Relapse: low   Safety Plan Reviewed: not applicable    Care Plan Updated: No    Does patient express agreement with the above plan? Yes     Diagnosis:  1. Current mild episode of major depressive disorder without prior episode (formerly [facility])        Referral appointment(s) scheduled? No       Diana Vasquez,  BRYATN

## 2022-09-19 ENCOUNTER — OFFICE VISIT (OUTPATIENT)
Dept: BEHAVIORAL HEALTH | Facility: CLINIC | Age: 62
End: 2022-09-19
Payer: COMMERCIAL

## 2022-09-19 DIAGNOSIS — F32.0 CURRENT MILD EPISODE OF MAJOR DEPRESSIVE DISORDER WITHOUT PRIOR EPISODE (HCC): ICD-10-CM

## 2022-09-19 PROCEDURE — 90837 PSYTX W PT 60 MINUTES: CPT | Performed by: MARRIAGE & FAMILY THERAPIST

## 2022-09-19 NOTE — PROGRESS NOTES
[facility]   Therapy Progress Note        Name: Lauren Mandel  MRN: [id number]  : 1960  Age: 62 y.o.  Date of assessment: 2022  PCP: Greg Lira M.D.  Persons in attendance: Patient  Total session time: 57 minutes      Topics addressed in psychotherapy include: Met with the patient in office for an individual counseling session.  The patient discussed that since she works at the [employer] in [place], she is been heavily involved with the air races this last week.  She also learned that her nephew's cancer has spread and that he is asking to have everyone around him before he starts his next round of treatment.  She is concerned about his well-being during treatment and is focusing her visit on his after party care plans.  Discuss with patient his need for self care as well as her own.  She reported that her  minimizes her self care.  She also reports that her  does not want to engage in activities.  Worked with the patient on life changes in language regarding self care.    This dictation has been created using voice recognition software and/or scribes. The accuracy of the dictation is limited by the abilities of the software and the expertise of the scribes. I expect there may be some errors of grammar and possibly content. I made every attempt to manually correct the errors within my dictation. However, errors related to voice recognition software and/or scribes may still exist and should be interpreted within the appropriate context.    Objective Observations:   Participation:Active verbal participation, Attentive, and Engaged   Grooming:Casual   Cognition:Alert and Fully Oriented   Eye Contact:Good   Mood:Euthymic   Affect:Flexible and Full range   Thought Process:Logical and Goal-directed   Speech:Rate within normal limits and Volume within normal limits    Current Risk:   Suicide: low   Homicide: low   Self-Harm: low   Relapse: low   Safety Plan Reviewed: not  applicable    Care Plan Updated: No    Does patient express agreement with the above plan? Yes     Diagnosis:  No diagnosis found.    Referral appointment(s) scheduled? No       BRYANT Montero

## 2022-10-06 ENCOUNTER — OFFICE VISIT (OUTPATIENT)
Dept: BEHAVIORAL HEALTH | Facility: CLINIC | Age: 62
End: 2022-10-06
Payer: COMMERCIAL

## 2022-10-06 DIAGNOSIS — F32.0 CURRENT MILD EPISODE OF MAJOR DEPRESSIVE DISORDER WITHOUT PRIOR EPISODE (HCC): ICD-10-CM

## 2022-10-06 PROCEDURE — 90837 PSYTX W PT 60 MINUTES: CPT | Performed by: MARRIAGE & FAMILY THERAPIST

## 2022-10-06 NOTE — PROGRESS NOTES
[facility]   Therapy Progress Note        Name: Lauren Mandel  MRN: [id number]  : 1960  Age: 62 y.o.  Date of assessment: 10/6/2022  PCP: Greg Lira M.D.  Persons in attendance: Patient  Total session time: 58 minutes      Topics addressed in psychotherapy include: Met with the patient in office for an individual counseling session.  The patient discussed that her nephew has been diagnosed with cancer and that she had traveled to [place] to see him.  The patient discuss the importance of family connections.  Worked with the patient on grief.  Discussed the patient's nephews outlook on life and death.  Supported the patient has she found out that her sister is also sick.    This dictation has been created using voice recognition software and/or scribes. The accuracy of the dictation is limited by the abilities of the software and the expertise of the scribes. I expect there may be some errors of grammar and possibly content. I made every attempt to manually correct the errors within my dictation. However, errors related to voice recognition software and/or scribes may still exist and should be interpreted within the appropriate context.    Objective Observations:   Participation:Active verbal participation, Attentive, and Engaged   Grooming:Casual   Cognition:Alert and Fully Oriented   Eye Contact:Good   Mood:Depressed   Affect:Flexible, Full range, and Sad   Thought Process:Logical and Goal-directed   Speech:Rate within normal limits and Volume within normal limits    Current Risk:   Suicide: low   Homicide: low   Self-Harm: low   Relapse: low   Safety Plan Reviewed: not applicable    Care Plan Updated: No    Does patient express agreement with the above plan? Yes     Diagnosis:  1. Current mild episode of major depressive disorder without prior episode (HCC)        Referral appointment(s) scheduled? No       BRYANT Montero

## 2022-10-20 ENCOUNTER — OFFICE VISIT (OUTPATIENT)
Dept: MEDICAL GROUP | Facility: CLINIC | Age: 62
End: 2022-10-20
Payer: COMMERCIAL

## 2022-10-20 VITALS
HEART RATE: 84 BPM | WEIGHT: 163 LBS | TEMPERATURE: 98 F | BODY MASS INDEX: 32 KG/M2 | SYSTOLIC BLOOD PRESSURE: 169 MMHG | HEIGHT: 60 IN | OXYGEN SATURATION: 95 % | DIASTOLIC BLOOD PRESSURE: 96 MMHG

## 2022-10-20 DIAGNOSIS — R73.02 IMPAIRED GLUCOSE TOLERANCE: ICD-10-CM

## 2022-10-20 DIAGNOSIS — Z00.00 PREVENTATIVE HEALTH CARE: ICD-10-CM

## 2022-10-20 PROCEDURE — 99213 OFFICE O/P EST LOW 20 MIN: CPT | Mod: GE

## 2022-10-20 ASSESSMENT — FIBROSIS 4 INDEX: FIB4 SCORE: 1.22

## 2022-10-21 NOTE — PROGRESS NOTES
Subjective:     CC: Follow up    HPI:   Lauren presents today with    Problem   Bmi 31.0-31.9,Adult    - 6 lb weight loss in past 3 months  - Improved diet. Eating more vegetables. Subsituting out white rice. No longer drinks sugary drinks  - No increased activity.     Preventative Health Care    - Last colonoscopy in 2012. Normal. Referral placed 2022.    - Mammogram 2022 with 1 yr follow up    - Complete hysterectomy with cervix removed as per patient.    - Hep C and HIV negative    - A1C    - 4/22 6.1%   - 8/22 6.0%    - ASCVD risk score of 4.7%. in 2022. No statin recommended.    - Obesity.     Impaired Glucose Tolerance    - Last A1C in August of 6.0%         Current Outpatient Medications Ordered in Epic   Medication Sig Dispense Refill    EPINEPHrine (EPIPEN) 0.3 MG/0.3ML Solution Auto-injector solution for injection Inject 0.3 mL into the thigh one time for 1 dose. 2 Each 0    XIIDRA 5 % Solution   3    ibuprofen (MOTRIN) 800 MG Tab Take 1 Tab by mouth every 8 hours as needed. 30 Tab 0    methylPREDNISolone (MEDROL DOSEPAK) 4 MG Tablet Therapy Pack Follow schedule on package instructions. 21 Tablet 0    benzonatate (TESSALON) 100 MG Cap Take 1 Capsule by mouth 3 times a day as needed for Cough. 60 Capsule 0    fluticasone (FLONASE) 50 MCG/ACT nasal spray INSTILL 1 SPRAY IN EACH NOSTRIL ONCE DAILY FOR 2 WEEKS      Acetaminophen (TYLENOL) 325 MG Cap Tylenol      cyclosporin (RESTASIS) 0.05 % ophthalmic emulsion Administer 1 Drop into both eyes 2 times a day.      Omega-3 Fatty Acids (OMEGA 3 PO) Take  by mouth.       No current Epic-ordered facility-administered medications on file.       ROS:  Gen: no fevers/chills, no changes in weight  Eyes: no changes in vision  ENT: no sore throat, no hearing loss, no bloody nose  Pulm: no sob, no cough  CV: no chest pain, no palpitations  GI: no nausea/vomiting, no diarrhea  : no dysuria  MSk: no myalgias  Skin: no rash  Neuro: no headaches, no  numbness/tingling  Heme/Lymph: no easy bruising      Objective:     Exam:  BP (!) 169/96 (BP Location: Right arm, Patient Position: Sitting, BP Cuff Size: Adult)   Pulse 84   Temp 36.7 °C (98 °F)   Ht 1.524 m (5')   Wt 73.9 kg (163 lb)   SpO2 95%   BMI 31.83 kg/m²  Body mass index is 31.83 kg/m².    Gen: Alert and oriented, No apparent distress.  HEENT: PERRL, EOMI, NCAT, uvula midline, no exudates  Neck: Neck is supple without lymphadenopathy.  Lungs: Normal effort, CTA bilaterally, no wheezes, rhonchi, or rales  CV: Regular rate and rhythm. No murmurs, rubs, or gallops.  Abd:  Soft, Non-distended, non-tender  Ext: No clubbing, cyanosis, edema.  Skin: No rashes, no erythema      Labs: Reviewed    Assessment & Plan:     62 y.o. female with the following:     Problem List Items Addressed This Visit       Preventative health care     Pt encourage to continue with weight loss from diet. Encouraged to start increased activity.         Impaired glucose tolerance     Last A1C 2 months ago. Will not repeat today. Pt with 6 pounds weight loss and improved diet.    - Encouraged patient to continue positive steps  - Will recheck A1C at next appointment         BMI 31.0-31.9,adult     - Further advice given on food choices. Increasing nuts/seed intake as snacks  - Brainstormed ways to increase activity. Will try evening walks with .            Pt currently going through difficult time with family. Sister in ICU. Nephew with terminal cancer. Extended family in [place].      No follow-ups on file.

## 2022-10-21 NOTE — ASSESSMENT & PLAN NOTE
- Further advice given on food choices. Increasing nuts/seed intake as snacks  - Brainstormed ways to increase activity. Will try evening walks with .

## 2022-10-21 NOTE — ASSESSMENT & PLAN NOTE
Last A1C 2 months ago. Will not repeat today. Pt with 6 pounds weight loss and improved diet.    - Encouraged patient to continue positive steps  - Will recheck A1C at next appointment

## 2022-10-31 ENCOUNTER — OFFICE VISIT (OUTPATIENT)
Dept: BEHAVIORAL HEALTH | Facility: CLINIC | Age: 62
End: 2022-10-31
Payer: COMMERCIAL

## 2022-10-31 DIAGNOSIS — F33.1 MODERATE EPISODE OF RECURRENT MAJOR DEPRESSIVE DISORDER (HCC): ICD-10-CM

## 2022-10-31 PROCEDURE — 90837 PSYTX W PT 60 MINUTES: CPT | Performed by: MARRIAGE & FAMILY THERAPIST

## 2022-10-31 NOTE — PROGRESS NOTES
[facility]   Therapy Progress Note        Name: Lauren Mandel  MRN: [id number]  : 1960  Age: 62 y.o.  Date of assessment: 10/31/2022  PCP: Greg Lira M.D.  Persons in attendance: Patient  Total session time: 55 minutes      Topics addressed in psychotherapy include: Met with the patient in office for an individual counseling session.  The patient discussed that she is found out her sister has staged 4 cancer.  This is coupled with her belief that her nephew will pass away today from cancer.  When bringing this information up to her , he reportedly said “Do what you need to do”.  The patient feels that he has little empathy for her.  She feels that he is currently “escaping ” from their relationship by working on their daughter's home.  Showed the patient the video of the four horsemen of the [employer] by Nick Brink.  Encourage the patient to externalize the issue with her  has a problem in their relationship rather than any fault of their own.  Requested that the patient show the video to her .  Supported and console the patient in her time of grief.    This dictation has been created using voice recognition software and/or scribes. The accuracy of the dictation is limited by the abilities of the software and the expertise of the scribes. I expect there may be some errors of grammar and possibly content. I made every attempt to manually correct the errors within my dictation. However, errors related to voice recognition software and/or scribes may still exist and should be interpreted within the appropriate context.    Objective Observations:   Participation:Active verbal participation, Attentive, and Engaged   Grooming:Casual   Cognition:Alert and Fully Oriented   Eye Contact:Good   Mood: Sad   Affect:Flexible, Full range, Sad, and Tearful   Thought Process:Logical and Goal-directed   Speech:Rate within normal limits and Volume within normal limits    Current  Risk:   Suicide: low   Homicide: low   Self-Harm: low   Relapse: low   Safety Plan Reviewed: not applicable    Care Plan Updated: No    Does patient express agreement with the above plan? Yes     Diagnosis:  1. Moderate episode of recurrent major depressive disorder (HCC)        Referral appointment(s) scheduled? No       HARSH Montero.

## 2022-11-15 ENCOUNTER — OFFICE VISIT (OUTPATIENT)
Dept: BEHAVIORAL HEALTH | Facility: CLINIC | Age: 62
End: 2022-11-15
Payer: COMMERCIAL

## 2022-11-15 DIAGNOSIS — F32.0 CURRENT MILD EPISODE OF MAJOR DEPRESSIVE DISORDER WITHOUT PRIOR EPISODE (HCC): ICD-10-CM

## 2022-11-15 PROCEDURE — 90837 PSYTX W PT 60 MINUTES: CPT | Performed by: MARRIAGE & FAMILY THERAPIST

## 2022-11-15 NOTE — PROGRESS NOTES
[facility]   Therapy Progress Note        Name: Lauren Mandel  MRN: [id number]  : 1960  Age: 62 y.o.  Date of assessment: 11/15/2022  PCP: Greg Lira M.D.  Persons in attendance: Patient  Total session time: 55 minutes      Topics addressed in psychotherapy include: Met with the patient in office for an individual counseling session.  The patient discussed that her nephew had passed away on  after our last meeting.  Her  and son are not attending services in [facility] with her.  Discussed levels of family support and grieving.  She reported that her sister is in the hospital with cancer and cannot remember the passing of her son nor do family members feel is important to retraumatize her.  Worked on supporting the patient.    This dictation has been created using voice recognition software and/or scribes. The accuracy of the dictation is limited by the abilities of the software and the expertise of the scribes. I expect there may be some errors of grammar and possibly content. I made every attempt to manually correct the errors within my dictation. However, errors related to voice recognition software and/or scribes may still exist and should be interpreted within the appropriate context.    Objective Observations:   Participation:Active verbal participation, Attentive, and Engaged   Grooming:Casual   Cognition:Alert and Fully Oriented   Eye Contact:Good   Mood:Euthymic and Depressed   Affect:Congruent with content and Sad   Thought Process:Logical and Goal-directed   Speech:Rate within normal limits and Volume within normal limits    Current Risk:   Suicide: low   Homicide: low   Self-Harm: low   Relapse: low   Safety Plan Reviewed: not applicable    Care Plan Updated: No    Does patient express agreement with the above plan? Yes     Diagnosis:  1. Current mild episode of major depressive disorder without prior episode (HCC)        Referral appointment(s)  scheduled? No       HARSH Montero.

## 2022-11-29 ENCOUNTER — APPOINTMENT (OUTPATIENT)
Dept: BEHAVIORAL HEALTH | Facility: CLINIC | Age: 62
End: 2022-11-29
Payer: COMMERCIAL

## 2022-12-27 ENCOUNTER — OFFICE VISIT (OUTPATIENT)
Dept: BEHAVIORAL HEALTH | Facility: CLINIC | Age: 62
End: 2022-12-27
Payer: COMMERCIAL

## 2022-12-27 DIAGNOSIS — F32.0 CURRENT MILD EPISODE OF MAJOR DEPRESSIVE DISORDER WITHOUT PRIOR EPISODE (HCC): ICD-10-CM

## 2022-12-27 PROCEDURE — 90837 PSYTX W PT 60 MINUTES: CPT | Performed by: MARRIAGE & FAMILY THERAPIST

## 2022-12-28 NOTE — PROGRESS NOTES
[facility]   Therapy Progress Note        Name: Lauren Mandel  MRN: [id number]  : 1960  Age: 62 y.o.  Date of assessment: 2022  PCP: Greg Lira M.D.  Persons in attendance: Patient  Total session time: 55 minutes      Topics addressed in psychotherapy include: Met with the patient in office for an individual counseling session.  The patient appeared sad and has she discussed trying to care for the spouse and family of her nephew who recently passed away.  She discussed hardships in finding them a living situation in [place].  When asked why she is working so hard, and at a distance to help these family members, she replied because she promised it to her nephew.  Worked with the patient on not overextending herself in multiple situations.  The patient appears to be helpful to a point where she does not manage her own self care.    This dictation has been created using voice recognition software and/or scribes. The accuracy of the dictation is limited by the abilities of the software and the expertise of the scribes. I expect there may be some errors of grammar and possibly content. I made every attempt to manually correct the errors within my dictation. However, errors related to voice recognition software and/or scribes may still exist and should be interpreted within the appropriate context.    Objective Observations:   Participation:Active verbal participation, Attentive, and Engaged   Grooming:Casual   Cognition:Alert and Fully Oriented   Eye Contact:Good   Mood:Depressed   Affect:Flexible, Full range, Congruent with content, and Sad   Thought Process:Logical and Goal-directed   Speech:Rate within normal limits and Volume within normal limits    Current Risk:   Suicide: low   Homicide: low   Self-Harm: low   Relapse: low   Safety Plan Reviewed: not applicable    Care Plan Updated: No    Does patient express agreement with the above plan? Yes     Diagnosis:  1. Current  mild episode of major depressive disorder without prior episode (HCC)        Referral appointment(s) scheduled? No       HARSH Montero.

## 2023-01-10 ENCOUNTER — TELEMEDICINE (OUTPATIENT)
Dept: BEHAVIORAL HEALTH | Facility: CLINIC | Age: 63
End: 2023-01-10
Payer: COMMERCIAL

## 2023-01-10 DIAGNOSIS — F32.0 CURRENT MILD EPISODE OF MAJOR DEPRESSIVE DISORDER WITHOUT PRIOR EPISODE (HCC): ICD-10-CM

## 2023-01-10 PROCEDURE — 90837 PSYTX W PT 60 MINUTES: CPT | Mod: GT | Performed by: MARRIAGE & FAMILY THERAPIST

## 2023-01-10 NOTE — PROGRESS NOTES
"[facility]   Therapy Progress Note      This visit was conducted via Zoom using secure and encrypted videoconferencing technology.  The patient was in a private location in the [facility].  The patient's identity was confirmed and verbal consent was obtained for this virtual visit.  Place of Service: POS 02 - Virtual visits from a location other than the patient's Home    Name: Lauren Mandel  MRN: [id number]  : 1960  Age: 62 y.o.  Date of assessment: 1/10/2023  PCP: Greg Lira M.D.  Persons in attendance: Patient  Total session time: 55 minutes    Objective Observations:   Participation:Active verbal participation, Attentive, and Engaged   Grooming:Casual   Cognition:Alert and Fully Oriented   Eye Contact:Good   Mood:Euthymic   Affect:Flexible and Full range   Thought Process:Logical and Goal-directed   Speech:Rate within normal limits and Volume within normal limits    Current Risk:   Suicide: low   Homicide: low   Self-Harm: low   Relapse: low   Safety Plan Reviewed: not applicable    Topics addressed in psychotherapy include: Patient reports that her sister passed away on . She discussed difficulty in knowing what to say when she gets calls of condolences. Patient discussed that her  isn't talking to her at the moment. She reported that her sister  alone in the hospital and has a hard time understanding that. Discussed that \"girls are crazy and boys are dumb\" Worked on communication skills. Discussed balance in life. Discussed \"The Grieving Brain\". Worked with the patient on grieving    Care Plan Updated: No    Does patient express agreement with the above plan? Yes     Diagnosis:  1. Current mild episode of major depressive disorder without prior episode (HCC)        Referral appointment(s) scheduled? No       HARSH Montero.    "

## 2023-01-23 ENCOUNTER — OFFICE VISIT (OUTPATIENT)
Dept: MEDICAL GROUP | Facility: CLINIC | Age: 63
End: 2023-01-23
Payer: COMMERCIAL

## 2023-01-23 VITALS
BODY MASS INDEX: 32.2 KG/M2 | DIASTOLIC BLOOD PRESSURE: 90 MMHG | HEART RATE: 76 BPM | WEIGHT: 164 LBS | HEIGHT: 60 IN | SYSTOLIC BLOOD PRESSURE: 150 MMHG | TEMPERATURE: 98 F | OXYGEN SATURATION: 97 %

## 2023-01-23 DIAGNOSIS — F32.0 CURRENT MILD EPISODE OF MAJOR DEPRESSIVE DISORDER WITHOUT PRIOR EPISODE (HCC): ICD-10-CM

## 2023-01-23 DIAGNOSIS — R73.02 IMPAIRED GLUCOSE TOLERANCE: ICD-10-CM

## 2023-01-23 LAB
HBA1C MFR BLD: 5.9 % (ref 0–5.6)
INT CON NEG: ABNORMAL
INT CON POS: ABNORMAL

## 2023-01-23 PROCEDURE — 3077F SYST BP >= 140 MM HG: CPT

## 2023-01-23 PROCEDURE — 3080F DIAST BP >= 90 MM HG: CPT

## 2023-01-23 PROCEDURE — 99213 OFFICE O/P EST LOW 20 MIN: CPT | Mod: GE

## 2023-01-23 PROCEDURE — 83036 HEMOGLOBIN GLYCOSYLATED A1C: CPT

## 2023-01-23 RX ORDER — METFORMIN HYDROCHLORIDE 500 MG/1
500 TABLET, EXTENDED RELEASE ORAL DAILY
Qty: 90 TABLET | Refills: 3 | Status: SHIPPED | OUTPATIENT
Start: 2023-01-23 | End: 2024-01-23 | Stop reason: SDUPTHER

## 2023-01-23 ASSESSMENT — PATIENT HEALTH QUESTIONNAIRE - PHQ9
CLINICAL INTERPRETATION OF PHQ2 SCORE: 6
SUM OF ALL RESPONSES TO PHQ QUESTIONS 1-9: 13
5. POOR APPETITE OR OVEREATING: 3 - NEARLY EVERY DAY

## 2023-01-23 ASSESSMENT — FIBROSIS 4 INDEX: FIB4 SCORE: 1.22

## 2023-01-24 ENCOUNTER — OFFICE VISIT (OUTPATIENT)
Dept: BEHAVIORAL HEALTH | Facility: CLINIC | Age: 63
End: 2023-01-24
Payer: COMMERCIAL

## 2023-01-24 DIAGNOSIS — F32.0 CURRENT MILD EPISODE OF MAJOR DEPRESSIVE DISORDER WITHOUT PRIOR EPISODE (HCC): ICD-10-CM

## 2023-01-24 PROCEDURE — 90837 PSYTX W PT 60 MINUTES: CPT | Performed by: MARRIAGE & FAMILY THERAPIST

## 2023-01-24 NOTE — PROGRESS NOTES
[facility]   Therapy Progress Note        Name: Lauren Mandel  MRN: [id number]  : 1960  Age: 62 y.o.  Date of assessment: 2023  PCP: Greg Lira M.D.  Persons in attendance: Patient  Total session time: 58  minutes      Topics addressed in psychotherapy include: Met with the patient in office for an individual counseling session.  The patient reported that her primary care provider wanted her to start taking Zoloft.  She feels this is a good decision help with the moods.  She reported several breakthroughs since our last session.  Most notably her  taking more responsibility for cooking his own meals and chores around the house.  Worked with the patient on validating his efforts.  Patient discussed that her nephews  services had been moved out the month which is also been a relief to her.  Patient discussed having some discussions with her sister, whom she has been distant.  Worked with the patient to discussions regarding various different relationships.  Patient also discussed work relationships.    This dictation has been created using voice recognition software and/or scribes. The accuracy of the dictation is limited by the abilities of the software and the expertise of the scribes. I expect there may be some errors of grammar and possibly content. I made every attempt to manually correct the errors within my dictation. However, errors related to voice recognition software and/or scribes may still exist and should be interpreted within the appropriate context.    Objective Observations:   Participation:Active verbal participation, Attentive, and Engaged   Grooming:Casual   Cognition:Alert and Fully Oriented   Eye Contact:Good   Mood:Euthymic and Happy   Affect:Flexible and Full range   Thought Process:Logical and Goal-directed   Speech:Rate within normal limits and Volume within normal limits    Current Risk:   Suicide: low   Homicide: low   Self-Harm: low   Relapse:  low   Safety Plan Reviewed: not applicable    Care Plan Updated: No    Does patient express agreement with the above plan? Yes     Diagnosis:  1. Current mild episode of major depressive disorder without prior episode (HCC)        Referral appointment(s) scheduled? No       HARSH Montero.

## 2023-01-31 ENCOUNTER — PATIENT MESSAGE (OUTPATIENT)
Dept: MEDICAL GROUP | Facility: CLINIC | Age: 63
End: 2023-01-31
Payer: COMMERCIAL

## 2023-02-06 ENCOUNTER — APPOINTMENT (OUTPATIENT)
Dept: TELEHEALTH | Facility: TELEMEDICINE | Age: 63
End: 2023-02-06
Payer: COMMERCIAL

## 2023-02-06 ENCOUNTER — OFFICE VISIT (OUTPATIENT)
Dept: URGENT CARE | Facility: PHYSICIAN GROUP | Age: 63
End: 2023-02-06
Payer: COMMERCIAL

## 2023-02-06 VITALS
OXYGEN SATURATION: 97 % | HEART RATE: 87 BPM | DIASTOLIC BLOOD PRESSURE: 82 MMHG | TEMPERATURE: 97.9 F | RESPIRATION RATE: 16 BRPM | BODY MASS INDEX: 32.2 KG/M2 | SYSTOLIC BLOOD PRESSURE: 140 MMHG | HEIGHT: 60 IN | WEIGHT: 164 LBS

## 2023-02-06 DIAGNOSIS — J01.90 ACUTE BACTERIAL SINUSITIS: ICD-10-CM

## 2023-02-06 DIAGNOSIS — B96.89 ACUTE BACTERIAL SINUSITIS: ICD-10-CM

## 2023-02-06 PROCEDURE — 99213 OFFICE O/P EST LOW 20 MIN: CPT

## 2023-02-06 RX ORDER — FLUTICASONE PROPIONATE 50 MCG
2 SPRAY, SUSPENSION (ML) NASAL DAILY
Qty: 16 G | Refills: 0 | Status: SHIPPED | OUTPATIENT
Start: 2023-02-06 | End: 2023-03-21 | Stop reason: SDUPTHER

## 2023-02-06 RX ORDER — DOXYCYCLINE HYCLATE 100 MG
100 TABLET ORAL 2 TIMES DAILY
Qty: 14 TABLET | Refills: 0 | Status: SHIPPED | OUTPATIENT
Start: 2023-02-06 | End: 2023-02-13

## 2023-02-06 ASSESSMENT — ENCOUNTER SYMPTOMS
SPUTUM PRODUCTION: 0
CHILLS: 0
FEVER: 0
SHORTNESS OF BREATH: 0
WHEEZING: 0
SINUS PAIN: 1
SORE THROAT: 0
HEMOPTYSIS: 0
COUGH: 0

## 2023-02-06 ASSESSMENT — FIBROSIS 4 INDEX: FIB4 SCORE: 1.22

## 2023-02-06 NOTE — PROGRESS NOTES
Subjective:   Lauren Mandel is a 62 y.o. female who presents for Sinusitis (Sinus headache, sinus pressure, cough, sore throat, x7/8 days )      HPI: This is a 62-year-old female who presents today for evaluation of sinus problem.  Patient reports developing associated sinus pain, pressure, nasal congestion, postnasal drip, bilateral ear pressure, green nasal discharge.  Patient reports symptoms developed 8 days ago after moving boxes in the garage.  She has been taking Motrin, using nasal sprays, and hot fluids without any improvement in symptoms.  She denies fevers.      Review of Systems   Constitutional:  Negative for chills, fever and malaise/fatigue.   HENT:  Positive for congestion, ear pain and sinus pain. Negative for ear discharge and sore throat.    Respiratory:  Negative for cough, hemoptysis, sputum production, shortness of breath and wheezing.      Medications:    Current Outpatient Medications on File Prior to Visit   Medication Sig Dispense Refill    sertraline (ZOLOFT) 50 MG Tab Take 1 Tablet by mouth every day. 90 Tablet 3    metFORMIN ER (GLUCOPHAGE XR) 500 MG TABLET SR 24 HR Take 1 Tablet by mouth every day. 90 Tablet 3    EPINEPHrine (EPIPEN) 0.3 MG/0.3ML Solution Auto-injector solution for injection Inject 0.3 mL into the thigh one time for 1 dose. 2 Each 0    fluticasone (FLONASE) 50 MCG/ACT nasal spray INSTILL 1 SPRAY IN EACH NOSTRIL ONCE DAILY FOR 2 WEEKS      Acetaminophen (TYLENOL) 325 MG Cap Tylenol      XIIDRA 5 % Solution   3    ibuprofen (MOTRIN) 800 MG Tab Take 1 Tab by mouth every 8 hours as needed. 30 Tab 0    cyclosporin (RESTASIS) 0.05 % ophthalmic emulsion Administer 1 Drop into both eyes 2 times a day.      Omega-3 Fatty Acids (OMEGA 3 PO) Take  by mouth.      benzonatate (TESSALON) 100 MG Cap Take 1 Capsule by mouth 3 times a day as needed for Cough. (Patient not taking: Reported on 2/6/2023) 60 Capsule 0     No current facility-administered medications on file prior to  visit.        Allergies:   Lac bovis, Pcn [penicillins], and Sulfa drugs    Problem List:   Patient Active Problem List   Diagnosis    Long COVID    Preventative health care    Recurrent sinusitis    Shingles    Headache    Fibromyalgia    Weight gain    Vitamin D deficiency    Vaginal atrophy    Stress incontinence    Sinus congestion    Shortness of breath    Rib pain    Plantar fasciitis, bilateral    Pain in unspecified joint    Overweight with body mass index (BMI) 25.0-29.9    Nail abnormality    Muscle atrophy    Impaired glucose tolerance    HTN (hypertension)    High alkaline phosphatase    Hearing loss    Hip pain, right    Foot pain, right    Fatigue    Eustachian tube dysfunction, bilateral    Depression    Diverticulosis    Breast pain, right    Atopic dermatitis    Arthralgia of ankle    Acute frontal sinusitis    Acute diarrhea    Acquired absence of both cervix and uterus    Acanthosis nigricans    Hives    Current mild episode of major depressive disorder without prior episode (HCC)    BMI 31.0-31.9,adult        Surgical History:  Past Surgical History:   Procedure Laterality Date    HYSTERECTOMY LAPAROSCOPY         Past Social Hx:   Social History     Tobacco Use    Smoking status: Never    Smokeless tobacco: Never   Vaping Use    Vaping Use: Never used   Substance Use Topics    Alcohol use: No    Drug use: No          Problem list, medications, and allergies reviewed by myself today in Epic.     Objective:     BP (!) 140/82 (BP Location: Left arm, Patient Position: Sitting, BP Cuff Size: Adult)   Pulse 87   Temp 36.6 °C (97.9 °F) (Temporal)   Resp 16   Ht 1.524 m (5')   Wt 74.4 kg (164 lb)   SpO2 97%   BMI 32.03 kg/m²     Physical Exam  Vitals and nursing note reviewed.   Constitutional:       General: She is not in acute distress.     Appearance: Normal appearance. She is normal weight. She is not ill-appearing, toxic-appearing or diaphoretic.   HENT:      Head: Normocephalic and  atraumatic.      Right Ear: Tympanic membrane, ear canal and external ear normal. There is no impacted cerumen.      Left Ear: Tympanic membrane, ear canal and external ear normal. There is no impacted cerumen.      Nose: Congestion present.      Right Sinus: Maxillary sinus tenderness present.      Left Sinus: Maxillary sinus tenderness present.      Mouth/Throat:      Mouth: Mucous membranes are moist.      Pharynx: Oropharynx is clear. No oropharyngeal exudate or posterior oropharyngeal erythema.   Cardiovascular:      Rate and Rhythm: Normal rate and regular rhythm.      Pulses: Normal pulses.      Heart sounds: Normal heart sounds. No murmur heard.    No friction rub. No gallop.   Pulmonary:      Effort: Pulmonary effort is normal. No respiratory distress.      Breath sounds: Normal breath sounds. No stridor. No wheezing, rhonchi or rales.   Chest:      Chest wall: No tenderness.   Musculoskeletal:      Cervical back: Neck supple. No tenderness.   Lymphadenopathy:      Cervical: No cervical adenopathy.   Skin:     General: Skin is warm and dry.      Capillary Refill: Capillary refill takes less than 2 seconds.   Neurological:      General: No focal deficit present.      Mental Status: She is alert and oriented to person, place, and time. Mental status is at baseline.      Cranial Nerves: No cranial nerve deficit.      Motor: No weakness.      Gait: Gait normal.   Psychiatric:         Mood and Affect: Mood normal.         Behavior: Behavior normal.         Thought Content: Thought content normal.         Judgment: Judgment normal.       Assessment/Plan:     Diagnosis and associated orders:   1. Acute bacterial sinusitis  doxycycline (VIBRAMYCIN) 100 MG Tab    fluticasone (FLONASE) 50 MCG/ACT nasal spray             Comments/MDM:   Pt is clinically stable at today's acute urgent care visit.  No acute distress noted. Appropriate for outpatient management at this time.     Acute problem  Doxycycline as  prescribed  Flonase as prescribed  Tylenol and ibuprofen for pain  Humidifier  Intranasal saline irrigations  Return for any new or worsening s/s             Discussed DDx, management options (risks,benefits, and alternatives to planned treatment), natural progression and supportive care.  Expressed understanding and the treatment plan was agreed upon. Questions were encouraged and answered   Return to urgent care prn if new or worsening sx or if there is no improvement in condition prn.    Educated in Red flags and indications to immediately call 911 or present to the Emergency Department.   Advised the patient to follow-up with the primary care physician for recheck, reevaluation, and consideration of further management.    I personally reviewed prior external notes and test results pertinent to today's visit.  I have independently reviewed and interpreted all diagnostics ordered during this urgent care acute visit.       Please note that this dictation was created using voice recognition software. I have made a reasonable attempt to correct obvious errors, but I expect that there are errors of grammar and possibly content that I did not discover before finalizing the note.    This note was electronically signed by FABIOLA Alvarez

## 2023-02-07 ENCOUNTER — OFFICE VISIT (OUTPATIENT)
Dept: BEHAVIORAL HEALTH | Facility: CLINIC | Age: 63
End: 2023-02-07
Payer: COMMERCIAL

## 2023-02-07 DIAGNOSIS — F32.0 CURRENT MILD EPISODE OF MAJOR DEPRESSIVE DISORDER WITHOUT PRIOR EPISODE (HCC): ICD-10-CM

## 2023-02-07 DIAGNOSIS — F43.21 GRIEF: ICD-10-CM

## 2023-02-07 PROCEDURE — 90837 PSYTX W PT 60 MINUTES: CPT | Performed by: MARRIAGE & FAMILY THERAPIST

## 2023-02-08 NOTE — PROGRESS NOTES
[facility]   Therapy Progress Note        Name: Lauren Mandel  MRN: [id number]  : 1960  Age: 62 y.o.  Date of assessment: 2023  PCP: Greg Lira M.D.  Persons in attendance: Patient  Total session time: 58 minutes      Topics addressed in psychotherapy include: Met with the patient in office for an individual counseling session.  The patient discussed planning for her sister's .  Worked with patient on grief reactions and understanding that planning is a part of grieving.  The patient discussed reports sleep habits and her nightly routine which result in her going to bed late, approximately 1:00 AM.  Worked with the patient on reasons for the intricacy of her routine as well as her ability to make some changes.    This dictation has been created using voice recognition software and/or scribes. The accuracy of the dictation is limited by the abilities of the software and the expertise of the scribes. I expect there may be some errors of grammar and possibly content. I made every attempt to manually correct the errors within my dictation. However, errors related to voice recognition software and/or scribes may still exist and should be interpreted within the appropriate context.    Objective Observations:   Participation:Active verbal participation, Attentive, and Engaged   Grooming:Casual   Cognition:Alert and Fully Oriented   Eye Contact:Good   Mood:Euthymic   Affect:Flexible and Full range   Thought Process:Logical and Goal-directed   Speech:Rate within normal limits and Volume within normal limits    Current Risk:   Suicide: low   Homicide: low   Self-Harm: low   Relapse: low   Safety Plan Reviewed: not applicable    Care Plan Updated: No    Does patient express agreement with the above plan? Yes     Diagnosis:  1. Current mild episode of major depressive disorder without prior episode (HCC)    2. Grief        Referral appointment(s) scheduled? No       Diana Vasquez,  BRYANT

## 2023-02-09 PROBLEM — F43.21 GRIEF: Status: ACTIVE | Noted: 2023-02-09

## 2023-03-02 ENCOUNTER — OFFICE VISIT (OUTPATIENT)
Dept: BEHAVIORAL HEALTH | Facility: CLINIC | Age: 63
End: 2023-03-02
Payer: COMMERCIAL

## 2023-03-02 DIAGNOSIS — F33.0 MILD EPISODE OF RECURRENT MAJOR DEPRESSIVE DISORDER (HCC): ICD-10-CM

## 2023-03-02 DIAGNOSIS — F43.21 GRIEF: ICD-10-CM

## 2023-03-02 PROCEDURE — 90837 PSYTX W PT 60 MINUTES: CPT | Performed by: MARRIAGE & FAMILY THERAPIST

## 2023-03-02 NOTE — PROGRESS NOTES
[facility]   Therapy Progress Note        Name: Lauren Mandel  MRN: [id number]  : 1960  Age: 62 y.o.  Date of assessment: 3/2/2023  PCP: Greg Lira M.D.  Persons in attendance: Patient  Total session time: 55 minutes    Topics addressed in psychotherapy include: Met with the patient in office for an individual counseling session.  The patient discussed that she went to [place] recently for her sister's .  She reported that her  and daughter went on the trip with her.  Patient discussed the adventures of the drive which included significant delays due to snow.  The patient did report that she feels closure from the .  Patient discussed having major anxiety on  evenings before work starts on Monday.  She feels she has a “crazy busy schedule”.  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  Patient discussed that her medication (Zoloft) leaves her feeling numb of emotion.    This dictation has been created using voice recognition software and/or scribes. The accuracy of the dictation is limited by the abilities of the software and the expertise of the scribes. I expect there may be some errors of grammar and possibly content. I made every attempt to manually correct the errors within my dictation. However, errors related to voice recognition software and/or scribes may still exist and should be interpreted within the appropriate context.    Objective Observations:   Participation:Active verbal participation, Attentive, and Engaged   Grooming:Casual   Cognition:Alert and Fully Oriented   Eye Contact:Good   Mood:Euthymic   Affect:Flexible and Full range   Thought Process:Logical and Goal-directed   Speech:Rate within normal limits and Volume within normal limits    Current Risk:   Suicide:  low   Homicide: low   Self-Harm: low   Relapse: low   Safety Plan Reviewed: no    Care Plan Updated: No    Does patient express agreement with the above plan? Yes     Diagnosis:  1. Mild episode of recurrent major depressive disorder (HCC)    2. Grief        Referral appointment(s) scheduled? No       HARSH Montero.

## 2023-03-05 DIAGNOSIS — B96.89 ACUTE BACTERIAL SINUSITIS: ICD-10-CM

## 2023-03-05 DIAGNOSIS — J01.90 ACUTE BACTERIAL SINUSITIS: ICD-10-CM

## 2023-03-09 ENCOUNTER — OFFICE VISIT (OUTPATIENT)
Dept: BEHAVIORAL HEALTH | Facility: CLINIC | Age: 63
End: 2023-03-09
Payer: COMMERCIAL

## 2023-03-09 DIAGNOSIS — F32.0 CURRENT MILD EPISODE OF MAJOR DEPRESSIVE DISORDER WITHOUT PRIOR EPISODE (HCC): ICD-10-CM

## 2023-03-09 PROCEDURE — 90837 PSYTX W PT 60 MINUTES: CPT | Performed by: MARRIAGE & FAMILY THERAPIST

## 2023-03-09 NOTE — PROGRESS NOTES
[facility]   Therapy Progress Note        Name: Lauren Mandel  MRN: [id number]  : 1960  Age: 62 y.o.  Date of assessment: 3/9/2023  PCP: Greg Lira M.D.  Persons in attendance: Patient  Total session time: 55 minutes      Topics addressed in psychotherapy include: Met with the patient in office for an individual counseling session.  The patient discussed that she has been working to remain civil with a coworker.  With that her work relationship has improved.  Patient discussed her 95 year old great aunt is struggling with her health.  She discussed that her relationship with her  has improved greatly.  She could not identify reasons why however confirmed to address his effort with him.  Worked with the patient on natural ways to increase serotonin and dopamine through diet, exercise, sunshine, and self care.  The patient still feels that her medications have numbed her emotions.  Continue to emphasize that she needs to discuss that with her prescriber.    This dictation has been created using voice recognition software and/or scribes. The accuracy of the dictation is limited by the abilities of the software and the expertise of the scribes. I expect there may be some errors of grammar and possibly content. I made every attempt to manually correct the errors within my dictation. However, errors related to voice recognition software and/or scribes may still exist and should be interpreted within the appropriate context.    Objective Observations:   Participation:Active verbal participation, Attentive, and Engaged   Grooming:Casual   Cognition:Alert and Fully Oriented   Eye Contact:Good   Mood:Anxious   Affect:Flexible and Full range   Thought Process:Logical and Goal-directed   Speech:Rate within normal limits and Volume within normal limits    Current Risk:   Suicide: low   Homicide: low   Self-Harm: low   Relapse: low   Safety Plan Reviewed: not applicable    Care Plan Updated:  Yes    Does patient express agreement with the above plan? Yes     Diagnosis:  1. Current mild episode of major depressive disorder without prior episode (HCC)        Referral appointment(s) scheduled? No       HARSH Montero.

## 2023-03-21 DIAGNOSIS — J01.90 ACUTE BACTERIAL SINUSITIS: ICD-10-CM

## 2023-03-21 DIAGNOSIS — B96.89 ACUTE BACTERIAL SINUSITIS: ICD-10-CM

## 2023-03-21 RX ORDER — FLUTICASONE PROPIONATE 50 MCG
2 SPRAY, SUSPENSION (ML) NASAL DAILY
Qty: 16 G | Refills: 0 | Status: SHIPPED | OUTPATIENT
Start: 2023-03-21 | End: 2023-03-22

## 2023-03-21 NOTE — TELEPHONE ENCOUNTER
Received request via: Pharmacy    Was the patient seen in the last year in this department? Yes, 01/23/2023    Does the patient have an active prescription (recently filled or refills available) for medication(s) requested? No, last refill was on 02/06/2023    Does the patient have [facility] Plus and need 100 day supply (blood pressure, diabetes and cholesterol meds only)? Patient does not have SCP

## 2023-03-28 ENCOUNTER — TELEMEDICINE (OUTPATIENT)
Dept: MEDICAL GROUP | Facility: CLINIC | Age: 63
End: 2023-03-28
Payer: COMMERCIAL

## 2023-03-28 DIAGNOSIS — F41.9 ANXIETY: ICD-10-CM

## 2023-03-28 PROCEDURE — 99213 OFFICE O/P EST LOW 20 MIN: CPT | Mod: GT,GE

## 2023-03-28 RX ORDER — GABAPENTIN 100 MG/1
100 CAPSULE ORAL
Qty: 7 CAPSULE | Refills: 0 | Status: SHIPPED | OUTPATIENT
Start: 2023-03-28 | End: 2023-04-03

## 2023-03-29 NOTE — PROGRESS NOTES
Virtual Visit: Established Patient   This visit was conducted via Zoom using secure and encrypted videoconferencing technology.   The patient was in their home in the [facility].    The patient's identity was confirmed and verbal consent was obtained for this virtual visit.    Subjective:   CC: No chief complaint on file.    Lauren Mandel is a 62 y.o. female presenting for evaluation and management of:    Anxiety/depression and prediabetes.    Patient states that she is significantly improved since starting Zoloft.  She finds it so far less anxious.  She is not depressed anymore at all.  She does report that she does not feel grief in the same way.  She is not sure if this is a bad thing or a good thing.  Overall she is very happy with the medications and does not want to make any changes.    ROS   As above    Current medicines (including changes today)  Current Outpatient Medications   Medication Sig Dispense Refill    gabapentin (NEURONTIN) 100 MG Cap Take 1 Capsule by mouth at bedtime. 7 Capsule 0    fluticasone (FLONASE) 50 MCG/ACT nasal spray ADMINISTER 2 SPRAYS INTO AFFECTED NOSTRIL(S) EVERY DAY 48 g 3    sertraline (ZOLOFT) 50 MG Tab Take 1 Tablet by mouth every day. 90 Tablet 3    metFORMIN ER (GLUCOPHAGE XR) 500 MG TABLET SR 24 HR Take 1 Tablet by mouth every day. 90 Tablet 3    EPINEPHrine (EPIPEN) 0.3 MG/0.3ML Solution Auto-injector solution for injection Inject 0.3 mL into the thigh one time for 1 dose. 2 Each 0    Acetaminophen (TYLENOL) 325 MG Cap Tylenol      XIIDRA 5 % Solution   3    ibuprofen (MOTRIN) 800 MG Tab Take 1 Tab by mouth every 8 hours as needed. 30 Tab 0    cyclosporin (RESTASIS) 0.05 % ophthalmic emulsion Administer 1 Drop into both eyes 2 times a day.      Omega-3 Fatty Acids (OMEGA 3 PO) Take  by mouth.       No current facility-administered medications for this visit.       Patient Active Problem List    Diagnosis Date Noted    Grief 02/09/2023    BMI 31.0-31.9,adult  10/20/2022    Current mild episode of major depressive disorder without prior episode (HCC) 08/25/2022    Hives 08/04/2022    Shingles 07/14/2022    Fibromyalgia 07/14/2022    Pain in unspecified joint 07/14/2022    HTN (hypertension) 07/14/2022    Hearing loss 07/14/2022    Diverticulosis 07/14/2022    Acquired absence of both cervix and uterus 07/14/2022    Long COVID 02/23/2022    Preventative health care 02/23/2022    Recurrent sinusitis 02/23/2022    Acute frontal sinusitis 08/27/2021    Rib pain 02/10/2021    Hip pain, right 02/10/2021    Nail abnormality 02/25/2020    Breast pain, right 02/25/2020    Muscle atrophy 06/04/2019    Stress incontinence 02/15/2019    Acute diarrhea 11/19/2018    Headache 11/05/2018    Shortness of breath 11/05/2018    Fatigue 11/05/2018    Vitamin D deficiency 08/03/2017    Impaired glucose tolerance 08/03/2017    High alkaline phosphatase 08/03/2017    Plantar fasciitis, bilateral 07/07/2017    Arthralgia of ankle 07/07/2017    Vaginal atrophy 07/05/2017    Overweight with body mass index (BMI) 25.0-29.9 07/05/2017    Foot pain, right 07/05/2017    Acanthosis nigricans 07/05/2017    Atopic dermatitis 09/29/2015    Weight gain 11/13/2014    Eustachian tube dysfunction, bilateral 11/13/2014    Depression 11/13/2014    Sinus congestion 04/10/2014        Objective:   There were no vitals taken for this visit.    Physical Exam:  Constitutional: Alert, no distress, well-groomed.  Skin: No rashes in visible areas.  Eye: Round. Conjunctiva clear, lids normal. No icterus.   ENMT: Lips pink without lesions, good dentition, moist mucous membranes. Phonation normal.  Neck: No masses, no thyromegaly. Moves freely without pain.  Respiratory: Unlabored respiratory effort, no cough or audible wheeze  Psych: Alert and oriented x3, normal affect and mood.     Assessment and Plan:   The following treatment plan was discussed:     We will continue 50 mg Zoloft daily.    Patient has not started  metformin.  We discussed that she can always try metformin and if she does not like it is easy to stop and there are no lasting long-term side effects.  She is amenable to trying to start it.    There are no diagnoses linked to this encounter.    Follow-up: No follow-ups on file.

## 2023-04-06 ENCOUNTER — OFFICE VISIT (OUTPATIENT)
Dept: BEHAVIORAL HEALTH | Facility: CLINIC | Age: 63
End: 2023-04-06
Payer: COMMERCIAL

## 2023-04-06 DIAGNOSIS — F33.0 MILD EPISODE OF RECURRENT MAJOR DEPRESSIVE DISORDER (HCC): ICD-10-CM

## 2023-04-06 PROCEDURE — 90837 PSYTX W PT 60 MINUTES: CPT | Performed by: MARRIAGE & FAMILY THERAPIST

## 2023-04-06 NOTE — PROGRESS NOTES
[facility]   Therapy Progress Note        Name: Lauren Mandel  MRN: [id number]  : 1960  Age: 62 y.o.  Date of assessment: 2023  PCP: Greg Lira M.D.  Persons in attendance: Patient  Total session time: 57 minutes      Topics addressed in psychotherapy include: Met with a patient for an individual counseling session in office.  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  The patient discussed that she has been having dreams and feeling more rested possibly due to her medications.  Discussed how the dreams were probably there but she is now in a deeper more restful sleep.  The patient reported that her aunt passed away last  and that they would be traveling to [facility] once again for a .  Discuss resiliency and introduced an harshal of conversation cards by the [employer].    This dictation has been created using voice recognition software and/or scribes. The accuracy of the dictation is limited by the abilities of the software and the expertise of the scribes. I expect there may be some errors of grammar and possibly content. I made every attempt to manually correct the errors within my dictation. However, errors related to voice recognition software and/or scribes may still exist and should be interpreted within the appropriate context.    Objective Observations:   Participation:Active verbal participation, Attentive, and Engaged   Grooming:Casual   Cognition:Alert and Fully Oriented   Eye Contact:Good   Mood:Euthymic   Affect:Flexible, Full range, and Congruent with content   Thought Process:Logical and Goal-directed   Speech:Rate within normal limits    Current Risk:   Suicide: low   Homicide: low   Self-Harm: low   Relapse: low   Safety Plan Reviewed: not applicable    Care Plan Updated: No    Does patient express agreement with the above plan? Yes     Diagnosis:  1. Mild episode of recurrent major depressive  disorder (HCC)        Referral appointment(s) scheduled? No       HARSH Montero.

## 2023-05-02 ENCOUNTER — OFFICE VISIT (OUTPATIENT)
Dept: BEHAVIORAL HEALTH | Facility: CLINIC | Age: 63
End: 2023-05-02
Payer: COMMERCIAL

## 2023-05-02 DIAGNOSIS — F32.0 CURRENT MILD EPISODE OF MAJOR DEPRESSIVE DISORDER WITHOUT PRIOR EPISODE (HCC): ICD-10-CM

## 2023-05-02 PROCEDURE — 90837 PSYTX W PT 60 MINUTES: CPT | Performed by: MARRIAGE & FAMILY THERAPIST

## 2023-05-02 NOTE — PROGRESS NOTES
[facility]   Therapy Progress Note        Name: Lauren Mandel  MRN: [id number]  : 1960  Age: 62 y.o.  Date of assessment: 2023  PCP: Greg Lira M.D.  Persons in attendance: Patient  Total session time: 55 minutes      Topics addressed in psychotherapy include: Met with the patient in office for an individual counseling session.  The patient reported that the “mean girl” at her job has quit.  This unfortunately it leaves increased work stressors for the patient.  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  Worked with the patient on maintaining a current project and assignment list.  Discussed allowing that list to stay work.  Worked with the patient on planning a getaway at any time up to one year.    This dictation has been created using voice recognition software and/or scribes. The accuracy of the dictation is limited by the abilities of the software and the expertise of the scribes. I expect there may be some errors of grammar and possibly content. I made every attempt to manually correct the errors within my dictation. However, errors related to voice recognition software and/or scribes may still exist and should be interpreted within the appropriate context.    Objective Observations:   Participation:Active verbal participation, Attentive, and Engaged   Grooming:Casual   Cognition:Alert and Fully Oriented   Eye Contact:Good   Mood:Anxious   Affect:Flexible, Full range, and Congruent with content   Thought Process:Logical and Goal-directed   Speech:Rate within normal limits and Volume within normal limits    Current Risk:   Suicide: low   Homicide: low   Self-Harm: low   Relapse: low   Safety Plan Reviewed: not applicable    Care Plan Updated: No    Does patient express agreement with the above plan? Yes     Diagnosis:  1. Current mild episode of major depressive disorder without prior episode (HCC)        Referral appointment(s)  scheduled? No       HARSH Montero.

## 2023-05-04 RX ORDER — GABAPENTIN 100 MG/1
100 CAPSULE ORAL
Qty: 30 CAPSULE | Refills: 0 | Status: SHIPPED | OUTPATIENT
Start: 2023-05-04 | End: 2024-03-22

## 2023-05-07 RX ORDER — FLUTICASONE PROPIONATE 50 MCG
SPRAY, SUSPENSION (ML) NASAL
Qty: 48 G | OUTPATIENT
Start: 2023-05-07

## 2023-05-18 ENCOUNTER — TELEMEDICINE (OUTPATIENT)
Dept: BEHAVIORAL HEALTH | Facility: CLINIC | Age: 63
End: 2023-05-18
Payer: COMMERCIAL

## 2023-05-18 DIAGNOSIS — F32.0 CURRENT MILD EPISODE OF MAJOR DEPRESSIVE DISORDER WITHOUT PRIOR EPISODE (HCC): ICD-10-CM

## 2023-05-18 PROCEDURE — 90837 PSYTX W PT 60 MINUTES: CPT | Mod: GT | Performed by: MARRIAGE & FAMILY THERAPIST

## 2023-05-18 NOTE — PROGRESS NOTES
[facility]   Therapy Progress Note      This visit was conducted via Zoom using secure and encrypted videoconferencing technology.  The patient was in a private location in the [facility].  The patient's identity was confirmed and verbal consent was obtained for this virtual visit.  Place of Service: POS 10 -The patient is at Home during their visit           Name: Lauren Mandel  MRN: [id number]  : 1960  Age: 62 y.o.  Date of assessment: 2023  PCP: Greg Lira M.D.  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 Stressing about finances and window replacement at her home. Patient discussed taking a perspective of wondering what Gods plans are when some things dont go her way. Discussed several examples related to her husbands brother and others incidences.     Care Plan Updated: No    Does patient express agreement with the above plan? Yes     Diagnosis:  1. Current mild episode of major depressive disorder without prior episode (HCC)        Referral appointment(s) scheduled? No       BRYANT Montero

## 2023-05-30 ENCOUNTER — TELEMEDICINE (OUTPATIENT)
Dept: BEHAVIORAL HEALTH | Facility: CLINIC | Age: 63
End: 2023-05-30
Payer: COMMERCIAL

## 2023-05-30 DIAGNOSIS — F32.0 CURRENT MILD EPISODE OF MAJOR DEPRESSIVE DISORDER WITHOUT PRIOR EPISODE (HCC): ICD-10-CM

## 2023-05-30 PROCEDURE — 90834 PSYTX W PT 45 MINUTES: CPT | Mod: GT | Performed by: MARRIAGE & FAMILY THERAPIST

## 2023-05-30 NOTE — PROGRESS NOTES
[facility]   Therapy Progress Note      This visit was conducted via Zoom using secure and encrypted videoconferencing technology.  The patient was in a private location in the [facility].  The patient's identity was confirmed and verbal consent was obtained for this virtual visit.  Place of Service:  POS 02 - Virtual visits from a location other than the patient's Home    Name: Lauren Mandel  MRN: [id number]  : 1960  Age: 62 y.o.  Date of assessment: 2023  PCP: Greg Lira M.D.  Persons in attendance: Patient  Total session time: 45 minutes    Objective Observations:   Participation:Active verbal participation   Grooming:Casual   Cognition:Alert and Fully Oriented   Eye Contact:Good   Mood:Euthymic   Affect:Flexible and Full range   Thought Process:Logical and Goal-directed   Speech:Rate within normal limits and Volume within normal limits    Current Risk:   Suicide: low   Homicide: low   Self-Harm: low   Relapse: low   Safety Plan Reviewed: not applicable    Topics addressed in psychotherapy include: Patient reports that work has been a bit more stressful due to errors made by a temporary employee. Discussed relationship with her 17 year old grandson. Discussed family relationships, specifically the relationship between her children. Discussed stressors with work and the inability to find adequate help. Discussed the need to continue self care and allow work to become secondary in that respect. Patient appears to be making good progress.     Care Plan Updated: No    Does patient express agreement with the above plan? Yes     Diagnosis:  1. Current mild episode of major depressive disorder without prior episode (HCC)        Referral appointment(s) scheduled? No       BRYANT Montero

## 2023-06-03 RX ORDER — FLUTICASONE PROPIONATE 50 MCG
SPRAY, SUSPENSION (ML) NASAL
Qty: 48 G | OUTPATIENT
Start: 2023-06-03

## 2023-06-27 ENCOUNTER — APPOINTMENT (OUTPATIENT)
Dept: BEHAVIORAL HEALTH | Facility: CLINIC | Age: 63
End: 2023-06-27
Payer: COMMERCIAL

## 2023-07-11 ENCOUNTER — OFFICE VISIT (OUTPATIENT)
Dept: BEHAVIORAL HEALTH | Facility: CLINIC | Age: 63
End: 2023-07-11
Payer: COMMERCIAL

## 2023-07-11 DIAGNOSIS — F32.0 CURRENT MILD EPISODE OF MAJOR DEPRESSIVE DISORDER WITHOUT PRIOR EPISODE (HCC): ICD-10-CM

## 2023-07-11 PROCEDURE — 90837 PSYTX W PT 60 MINUTES: CPT | Performed by: MARRIAGE & FAMILY THERAPIST

## 2023-07-17 NOTE — PROGRESS NOTES
[facility]   Therapy Progress Note        Name: Lauren Mandel  MRN: [id number]  : 1960  Age: 63 y.o.  Date of assessment: 2023  PCP: Greg Lira M.D.  Persons in attendance: Patient  Total session time: 55 minutes      Topics addressed in psychotherapy include: Met with the patient in office for an individual counseling session.  The patient reported that she feels overwhelmed by responsibilities at work.  She continues to handle several responsibilities due to a lack of help.  Worked with the patient to reduce those fears and anxieties understanding the limitations of her work capabilities.  Worked with the patient on maintaining those work boundaries.  It appears that the patient is making good progress.    This dictation has been created using voice recognition software and/or scribes. The accuracy of the dictation is limited by the abilities of the software and the expertise of the scribes. I expect there may be some errors of grammar and possibly content. I made every attempt to manually correct the errors within my dictation. However, errors related to voice recognition software and/or scribes may still exist and should be interpreted within the appropriate context.    Objective Observations:   Participation:Active verbal participation, Attentive, and Engaged   Grooming:Casual   Cognition:Alert and Fully Oriented   Eye Contact:Good   Mood:Euthymic   Affect:Flexible and Full range   Thought Process:Logical and Goal-directed   Speech:Rate within normal limits and Volume within normal limits    Current Risk:   Suicide: low   Homicide: low   Self-Harm: low   Relapse: low   Safety Plan Reviewed: not applicable    Care Plan Updated: No    Does patient express agreement with the above plan? Yes     Diagnosis:  1. Current mild episode of major depressive disorder without prior episode (HCC)        Referral appointment(s) scheduled? No       BRYANT Montero

## 2023-07-25 ENCOUNTER — OFFICE VISIT (OUTPATIENT)
Dept: BEHAVIORAL HEALTH | Facility: CLINIC | Age: 63
End: 2023-07-25
Payer: COMMERCIAL

## 2023-07-25 DIAGNOSIS — F32.0 CURRENT MILD EPISODE OF MAJOR DEPRESSIVE DISORDER WITHOUT PRIOR EPISODE (HCC): ICD-10-CM

## 2023-07-25 PROCEDURE — 90837 PSYTX W PT 60 MINUTES: CPT | Performed by: MARRIAGE & FAMILY THERAPIST

## 2023-07-26 NOTE — PROGRESS NOTES
[facility]   Therapy Progress Note        Name: Lauren Mandel  MRN: [id number]  : 1960  Age: 63 y.o.  Date of assessment: 2023  PCP: Greg Lira M.D.  Persons in attendance: Patient  Total session time: 55 minutes      Topics addressed in psychotherapy include: Met with the patient in office for an individual counseling session.  The patient discussed not being invited to a family event.  Discussed differences in family relations and different perceptions of the importance of family relationships.  The patient feels she has a much stronger relational family tie than her .  Discussed various systems.  Worked with the patient on accepting and committing to changing the things which she can control.      This dictation has been created using voice recognition software and/or scribes. The accuracy of the dictation is limited by the abilities of the software and the expertise of the scribes. I expect there may be some errors of grammar and possibly content. I made every attempt to manually correct the errors within my dictation. However, errors related to voice recognition software and/or scribes may still exist and should be interpreted within the appropriate context.    Objective Observations:   Participation:Active verbal participation, Attentive, and Engaged   Grooming:Casual   Cognition:Alert and Fully Oriented   Eye Contact:Good   Mood:Euthymic and Anxious   Affect:Flexible and Full range   Thought Process:Logical and Goal-directed   Speech:Rate within normal limits and Volume within normal limits    Current Risk:   Suicide: low   Homicide: low   Self-Harm: low   Relapse: low   Safety Plan Reviewed: not applicable    Care Plan Updated: No    Does patient express agreement with the above plan? Yes     Diagnosis:  1. Current mild episode of major depressive disorder without prior episode (HCC)        Referral appointment(s) scheduled? No       BRYANT Montero

## 2023-08-15 ENCOUNTER — TELEMEDICINE (OUTPATIENT)
Dept: BEHAVIORAL HEALTH | Facility: CLINIC | Age: 63
End: 2023-08-15
Payer: COMMERCIAL

## 2023-08-15 DIAGNOSIS — F32.0 CURRENT MILD EPISODE OF MAJOR DEPRESSIVE DISORDER WITHOUT PRIOR EPISODE (HCC): ICD-10-CM

## 2023-08-15 PROCEDURE — 90837 PSYTX W PT 60 MINUTES: CPT | Mod: GT | Performed by: MARRIAGE & FAMILY THERAPIST

## 2023-08-15 NOTE — PROGRESS NOTES
[facility]   Therapy Progress Note      This visit was conducted via Zoom using secure and encrypted videoconferencing technology.  The patient was in a private location in the [facility].  The patient's identity was confirmed and verbal consent was obtained for this virtual visit.  Place of Service:      POS 02 - Virtual visits from a location other than the patient's Home    Name: Lauren Mandel  MRN: [id number]  : 1960  Age: 63 y.o.  Date of assessment: 8/15/2023  PCP: Greg Lira M.D.  Persons in attendance: Patient  Total session time: 55 minutes    Objective Observations:   Participation:Active verbal participation, Attentive, and Engaged   Grooming:Casual   Cognition:Alert and Fully Oriented   Eye Contact:Good   Mood:Euthymic   Affect:Flexible and Full range   Thought Process:Logical and Goal-directed   Speech:Rate within normal limits and Volume within normal limits    Current Risk:   Suicide: low   Homicide: low   Self-Harm: low   Relapse: low   Safety Plan Reviewed: not applicable    Topics addressed in psychotherapy include: A friend passed away. Her  was with the friend when he had either a heart attack or stroke. He is hospitalized and brain dead at the moment. The patient and her  are going to say their goodbyes.   Patient reported having a good weekend. Youngest sister drove from [place] to visit. Her sister is considering leaving [place].   Discussed not getting stuck in all the small stuff.   Discussed providing support to others. Discussed the need to be healthier.   Discussed the difficulty in  starting hard things.    Care Plan Updated: No    Does patient express agreement with the above plan? Yes     Diagnosis:  1. Current mild episode of major depressive disorder without prior episode (HCC)        Referral appointment(s) scheduled? No       BRYANT Montero

## 2023-08-26 NOTE — ASSESSMENT & PLAN NOTE
- Continue current dosing of sertraline  - Can consider discontinuing sertraline in future if patient continues to recover

## 2023-08-26 NOTE — PROGRESS NOTES
Subjective:     CC: Follow up    HPI:   Lauren presents today with    Problem   Current Mild Episode of Major Depressive Disorder Without Prior Episode (Hcc)    - Symptoms improving on sertraline  - Has good support system     Impaired Glucose Tolerance    - Last A1C in August of 6.0%, today of 5.9%  - On metformin 500 mg qday  - Had good lifestyle changes with increased activity and better food choices but then with some weight gain and reversal to diet high in carbs/fats 1-2 months prior to appointment         Current Outpatient Medications Ordered in Epic   Medication Sig Dispense Refill    sertraline (ZOLOFT) 50 MG Tab Take 1 Tablet by mouth every day. 90 Tablet 3    metFORMIN ER (GLUCOPHAGE XR) 500 MG TABLET SR 24 HR Take 1 Tablet by mouth every day. 90 Tablet 3    gabapentin (NEURONTIN) 100 MG Cap TAKE 1 CAPSULE BY MOUTH AT BEDTIME 30 Capsule 0    fluticasone (FLONASE) 50 MCG/ACT nasal spray ADMINISTER 2 SPRAYS INTO AFFECTED NOSTRIL(S) EVERY DAY 48 g 3    EPINEPHrine (EPIPEN) 0.3 MG/0.3ML Solution Auto-injector solution for injection Inject 0.3 mL into the thigh one time for 1 dose. 2 Each 0    Acetaminophen (TYLENOL) 325 MG Cap Tylenol      XIIDRA 5 % Solution   3    ibuprofen (MOTRIN) 800 MG Tab Take 1 Tab by mouth every 8 hours as needed. 30 Tab 0    cyclosporin (RESTASIS) 0.05 % ophthalmic emulsion Administer 1 Drop into both eyes 2 times a day.      Omega-3 Fatty Acids (OMEGA 3 PO) Take  by mouth.       No current Epic-ordered facility-administered medications on file.       ROS:  As above      Objective:     Exam:  BP (!) 150/90 (BP Location: Right arm, Patient Position: Sitting, BP Cuff Size: Adult)   Pulse 76   Temp 36.7 °C (98 °F)   Ht 1.524 m (5')   Wt 74.4 kg (164 lb)   SpO2 97%   BMI 32.03 kg/m²  Body mass index is 32.03 kg/m².    Gen: Alert and oriented, No apparent distress.  HEENT: PERRL, EOMI, NCAT, uvula midline, no exudates  Neck: Neck is supple without lymphadenopathy.  Lungs: Normal  effort, CTA bilaterally, no wheezes, rhonchi, or rales  CV: Regular rate and rhythm. No murmurs, rubs, or gallops.  Abd:  Soft, Non-distended, non-tender  Ext: No clubbing, cyanosis, edema.  Skin: No rashes, no erythema    Labs: a1c ordered    Assessment & Plan:     63 y.o. female with the following:     Problem List Items Addressed This Visit       Impaired glucose tolerance     - A1c slightly improved  - Continue metformin  - Continue to encourage patient to make positive dietary changes.         Relevant Orders    POCT  A1C (Completed)    Current mild episode of major depressive disorder without prior episode (HCC)     - Continue current dosing of sertraline  - Can consider discontinuing sertraline in future if patient continues to recover         Relevant Medications    sertraline (ZOLOFT) 50 MG Tab           No follow-ups on file.

## 2023-08-26 NOTE — ASSESSMENT & PLAN NOTE
- A1c slightly improved  - Continue metformin  - Continue to encourage patient to make positive dietary changes.

## 2023-09-19 ENCOUNTER — TELEMEDICINE (OUTPATIENT)
Dept: BEHAVIORAL HEALTH | Facility: CLINIC | Age: 63
End: 2023-09-19
Payer: COMMERCIAL

## 2023-09-19 DIAGNOSIS — F32.0 CURRENT MILD EPISODE OF MAJOR DEPRESSIVE DISORDER WITHOUT PRIOR EPISODE (HCC): ICD-10-CM

## 2023-09-19 PROCEDURE — 90837 PSYTX W PT 60 MINUTES: CPT | Mod: GT | Performed by: MARRIAGE & FAMILY THERAPIST
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
## 2023-11-06 ENCOUNTER — OFFICE VISIT (OUTPATIENT)
Dept: BEHAVIORAL HEALTH | Facility: CLINIC | Age: 63
End: 2023-11-06
Payer: COMMERCIAL

## 2023-11-06 DIAGNOSIS — F32.0 CURRENT MILD EPISODE OF MAJOR DEPRESSIVE DISORDER WITHOUT PRIOR EPISODE (HCC): ICD-10-CM

## 2023-11-06 PROCEDURE — 90837 PSYTX W PT 60 MINUTES: CPT | Performed by: MARRIAGE & FAMILY THERAPIST

## 2023-11-07 NOTE — PROGRESS NOTES
[facility]   Therapy Progress Note        Name: Lauren Mandel  MRN: [id number]  : 1960  Age: 63 y.o.  Date of assessment: 2023  PCP: Greg Lira M.D.  Persons in attendance: Patient  Total session time: 57   minutes      Topics addressed in psychotherapy include: Met with the patient in office for an individual counseling session.      Behavior:  The patient was open and honest during session.      Content of Therapy:   The patient reported that she is feeling particularly stressful recently.  She reports that Thursday is her mother's birthday and she is coming up on the anniversary of her nephews passing.  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    Therapeutic Intervention:   This session, the therapeutic focus, was on managing and maintaining boundaries and expectations.  Worked with the patient on brainstorming ideas to protect her workflow in order to reduce her anxiety at work.  Discussed whiteboards and the Pomodoro technique.  Supported the patient in her management of her emotions and her workflow.      This dictation has been created using voice recognition software and/or scribes. The accuracy of the dictation is limited by the abilities of the software and the expertise of the scribes. I expect there may be some errors of grammar and possibly content. I made every attempt to manually correct the errors within my dictation. However, errors related to voice recognition software and/or scribes may still exist and should be interpreted within the appropriate context.    Objective Observations:   Participation:Active verbal participation, Attentive, and Engaged   Grooming:Casual   Cognition:Alert and Fully Oriented   Eye Contact:Good   Mood:Anxious   Affect:Flexible, Full range, Congruent with content, and Sad   Thought Process:Logical and Goal-directed   Speech:Rate within normal limits and  Volume within normal limits    Current Risk:   Suicide: low   Homicide: low   Self-Harm: low   Relapse: low   Safety Plan Reviewed: not applicable    Care Plan Updated: No    Does patient express agreement with the above plan? Yes     Diagnosis:  1. Current mild episode of major depressive disorder without prior episode (HCC)        Referral appointment(s) scheduled? BRYANT De Leon

## 2023-11-21 ENCOUNTER — TELEMEDICINE (OUTPATIENT)
Dept: BEHAVIORAL HEALTH | Facility: CLINIC | Age: 63
End: 2023-11-21
Payer: COMMERCIAL

## 2023-11-21 DIAGNOSIS — F32.0 CURRENT MILD EPISODE OF MAJOR DEPRESSIVE DISORDER WITHOUT PRIOR EPISODE (HCC): ICD-10-CM

## 2023-11-21 PROCEDURE — 90837 PSYTX W PT 60 MINUTES: CPT | Mod: GT | Performed by: MARRIAGE & FAMILY THERAPIST

## 2023-11-22 NOTE — PROGRESS NOTES
[facility]   Therapy Progress Note      This visit was conducted via Zoom using secure and encrypted videoconferencing technology.  The patient was in a private location in the [facility].  The patient's identity was confirmed and verbal consent was obtained for this virtual visit.  Place of Service: POS 20 -The patient is not at Home during their visit but in a secure private location at work.        Name: Lauren Mandel  MRN: [id number]  : 1960  Age: 63 y.o.  Date of assessment: 2023  PCP: Greg Lira M.D.  Persons in attendance: Patient  Total session time: 55 minutes    Objective Observations:   Participation:Active verbal participation   Grooming:Casual   Cognition:Alert and Fully Oriented   Eye Contact:Good   Mood:Euthymic and Anxious   Affect:Flexible and Full range   Thought Process:Logical and Goal-directed   Speech:Rate within normal limits and Volume within normal limits    Current Risk:   Suicide: low   Homicide: low   Self-Harm: low   Relapse: low   Safety Plan Reviewed: not applicable    Topics addressed in psychotherapy include:   Met with the patient via zoom for an individual counseling session.      Behavior:  The patient appeared honest and open during session.    Content of Therapy:   The patient discussed that her boss seems to be accepting her decisions and focus in completing tasks. The patient discuseed distress with oldest son who invited her to Thanksgiving Dinner but not daughter. The patient reported that she is doing better with .    Therapeutic Intervention:   This session, the therapeutic focus, was on discussing resiliency and strength while focusing on progress and growth.    Discussed work stress and managing her boss. Discussed boss's priorities vs her priorities. Discussed reading [employer] to relate to her boss. Difficulty in discussing long term with her . Discussed various information sources regarding long term. Discussed  fulfilling what the patient wants to do. Discussed her skill set. Patients father turned 89, planning on his 90th birthday.       Care Plan Updated: No    Does patient express agreement with the above plan? Yes     Diagnosis:  1. Current mild episode of major depressive disorder without prior episode (HCC)        Referral appointment(s) scheduled? HARSH De Leon.

## 2023-12-11 ENCOUNTER — OFFICE VISIT (OUTPATIENT)
Dept: BEHAVIORAL HEALTH | Facility: CLINIC | Age: 63
End: 2023-12-11
Payer: COMMERCIAL

## 2023-12-11 DIAGNOSIS — F32.0 CURRENT MILD EPISODE OF MAJOR DEPRESSIVE DISORDER WITHOUT PRIOR EPISODE (HCC): ICD-10-CM

## 2023-12-11 PROCEDURE — 90837 PSYTX W PT 60 MINUTES: CPT | Performed by: MARRIAGE & FAMILY THERAPIST

## 2023-12-12 NOTE — PROGRESS NOTES
[facility]   Therapy Progress Note        Name: Lauren Mandel  MRN: [id number]  : 1960  Age: 63 y.o.  Date of assessment: 2023  PCP: Greg Lira M.D.  Persons in attendance: Patient  Total session time: 55 minutes      Topics addressed in psychotherapy include: Met with the patient in office for an individual counseling session.      Behavior:  The patient was open and honest during session.      Content of Therapy:   The patient discussed that if she is feeling stressed specifically on Sundays before having to return to work on .  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  Patient discussed if the inner options by her boss who is trying to sell the company and his lack of focus torture work.  Patient reported that her  has been more understanding recently.      Therapeutic Intervention:   This session, the therapeutic focus, was on allowing the patient to problem solve the situation.  Discuss taking a work day at home rather than going into the office to minimize distractions.  Discussed other options with the patient including having a list and hours required to complete tasks and allowing her boss to prioritize.    This dictation has been created using voice recognition software and/or scribes. The accuracy of the dictation is limited by the abilities of the software and the expertise of the scribes. I expect there may be some errors of grammar and possibly content. I made every attempt to manually correct the errors within my dictation. However, errors related to voice recognition software and/or scribes may still exist and should be interpreted within the appropriate context.    Objective Observations:   Participation:Active verbal participation, Attentive, and Engaged   Grooming:Casual   Cognition:Flexible, Full range, and Anxious   Eye Contact:Good   Mood:Anxious   Affect:Flexible, Full range, and Congruent with  content   Thought Process:Logical and Goal-directed   Speech:Rate within normal limits and Volume within normal limits    Current Risk:   Suicide: low   Homicide: low   Self-Harm: low   Relapse: low   Safety Plan Reviewed: not applicable    Care Plan Updated: No    Does patient express agreement with the above plan? Yes     Diagnosis:  1. Current mild episode of major depressive disorder without prior episode (HCC)        Referral appointment(s) scheduled? No       BRYANT Montero

## 2023-12-28 ENCOUNTER — TELEMEDICINE (OUTPATIENT)
Dept: BEHAVIORAL HEALTH | Facility: CLINIC | Age: 63
End: 2023-12-28
Payer: COMMERCIAL

## 2023-12-28 DIAGNOSIS — F32.0 CURRENT MILD EPISODE OF MAJOR DEPRESSIVE DISORDER WITHOUT PRIOR EPISODE (HCC): ICD-10-CM

## 2023-12-28 PROCEDURE — 90837 PSYTX W PT 60 MINUTES: CPT | Mod: GT | Performed by: MARRIAGE & FAMILY THERAPIST
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
## 2023-12-29 ENCOUNTER — TELEMEDICINE (OUTPATIENT)
Dept: MEDICAL GROUP | Facility: CLINIC | Age: 63
End: 2023-12-29
Payer: COMMERCIAL

## 2023-12-29 DIAGNOSIS — R19.7 ACUTE DIARRHEA: ICD-10-CM

## 2023-12-29 DIAGNOSIS — R73.02 IMPAIRED GLUCOSE TOLERANCE: ICD-10-CM

## 2023-12-29 PROCEDURE — 99213 OFFICE O/P EST LOW 20 MIN: CPT | Mod: GT,GE

## 2023-12-29 NOTE — ASSESSMENT & PLAN NOTE
- C.diff PCR, stool culture, CBC for wbc and Hgb, CMP  - Will defer PCR testing.    Pt otherwise well appearing. Likely infectious in nature given ill . Will consider expanding differential if diarrhea continues, becomes bloody, or patient has fevers. Discussed this with the patient. Will consider expanding work-up to chronic diarrhea if it continues.

## 2023-12-30 NOTE — PROGRESS NOTES
Virtual Visit: Established Patient   This visit was conducted via Zoom using secure and encrypted videoconferencing technology.   The patient was in their home in the state of [place].    The patient's identity was confirmed and verbal consent was obtained for this virtual visit.    Subjective:   CC:   Chief Complaint   Patient presents with    Establish Care     Update prescription sertraline has been sick on and off for the past month has been having diarrhea, bloating, cough congestion. Had vomiting a week ago     Lauren Mandel is a 63 y.o. female presenting for evaluation and management of:    Problem   Acute Diarrhea    - Acute/persistent  - Started 2 weeks ago. Last week with vomiting - resolved. This week diarrhea worsening.  - Now going 8-10 times a day  - Watery diarrhea. No black or bloody stool. Small volume.  - No recent travel. No antibiotic use. Not camping, + sick contacts -  is sick.  - + Fevers and chills last week. None in greater than 1 week.   - Sulfur smell initially.           ROS   As above    Current medicines (including changes today)  Current Outpatient Medications   Medication Sig Dispense Refill    gabapentin (NEURONTIN) 100 MG Cap TAKE 1 CAPSULE BY MOUTH AT BEDTIME 30 Capsule 0    sertraline (ZOLOFT) 50 MG Tab Take 1 Tablet by mouth every day. 90 Tablet 3    EPINEPHrine (EPIPEN) 0.3 MG/0.3ML Solution Auto-injector solution for injection Inject 0.3 mL into the thigh one time for 1 dose. 2 Each 0    Acetaminophen (TYLENOL) 325 MG Cap Tylenol      XIIDRA 5 % Solution   3    ibuprofen (MOTRIN) 800 MG Tab Take 1 Tab by mouth every 8 hours as needed. 30 Tab 0    fluticasone (FLONASE) 50 MCG/ACT nasal spray ADMINISTER 2 SPRAYS INTO AFFECTED NOSTRIL(S) EVERY DAY 48 g 3    metFORMIN ER (GLUCOPHAGE XR) 500 MG TABLET SR 24 HR Take 1 Tablet by mouth every day. 90 Tablet 3    cyclosporin (RESTASIS) 0.05 % ophthalmic emulsion Administer 1 Drop into both eyes 2 times a day.      Omega-3 Fatty  Acids (OMEGA 3 PO) Take  by mouth.       No current facility-administered medications for this visit.       Patient Active Problem List    Diagnosis Date Noted    Grief 02/09/2023    BMI 31.0-31.9,adult 10/20/2022    Current mild episode of major depressive disorder without prior episode (HCC) 08/25/2022    Hives 08/04/2022    Shingles 07/14/2022    Fibromyalgia 07/14/2022    Pain in unspecified joint 07/14/2022    HTN (hypertension) 07/14/2022    Hearing loss 07/14/2022    Diverticulosis 07/14/2022    Acquired absence of both cervix and uterus 07/14/2022    Long COVID 02/23/2022    Preventative health care 02/23/2022    Recurrent sinusitis 02/23/2022    Acute frontal sinusitis 08/27/2021    Rib pain 02/10/2021    Hip pain, right 02/10/2021    Nail abnormality 02/25/2020    Breast pain, right 02/25/2020    Muscle atrophy 06/04/2019    Stress incontinence 02/15/2019    Acute diarrhea 11/19/2018    Headache 11/05/2018    Shortness of breath 11/05/2018    Fatigue 11/05/2018    Vitamin D deficiency 08/03/2017    Impaired glucose tolerance 08/03/2017    High alkaline phosphatase 08/03/2017    Plantar fasciitis, bilateral 07/07/2017    Arthralgia of ankle 07/07/2017    Vaginal atrophy 07/05/2017    Overweight with body mass index (BMI) 25.0-29.9 07/05/2017    Foot pain, right 07/05/2017    Acanthosis nigricans 07/05/2017    Atopic dermatitis 09/29/2015    Weight gain 11/13/2014    Eustachian tube dysfunction, bilateral 11/13/2014    Depression 11/13/2014    Sinus congestion 04/10/2014        Objective:   There were no vitals taken for this visit.    Physical Exam:  Constitutional: Alert, no distress, well-groomed.  Skin: No rashes in visible areas.  Eye: Round. Conjunctiva clear, lids normal. No icterus.   ENMT: Lips pink without lesions, good dentition, moist mucous membranes. Phonation normal.  Neck: No masses, no thyromegaly. Moves freely without pain.  Respiratory: Unlabored respiratory effort, no cough or audible  wheeze  Psych: Alert and oriented x3, normal affect and mood.     Assessment and Plan:   The following treatment plan was discussed:     Pt has SOB and hair loss. TSH was ordered. Discussed with patient that she needs to come to clinic for an in person exam as my differential is very large. Pt to schedule appointment.      Problem List Items Addressed This Visit       Impaired glucose tolerance    Relevant Orders    HEMOGLOBIN A1C    Acute diarrhea     - C.diff PCR, stool culture, CBC for wbc and Hgb, CMP  - Will defer PCR testing.    Pt otherwise well appearing. Likely infectious in nature given ill . Will consider expanding differential if diarrhea continues, becomes bloody, or patient has fevers. Discussed this with the patient. Will consider expanding work-up to chronic diarrhea if it continues.         Relevant Orders    C Diff by PCR rflx Toxin    CULTURE STOOL    Comp Metabolic Panel    CBC WITH DIFFERENTIAL    TSH WITH REFLEX TO FT4       Follow-up: No follow-ups on file.

## 2024-01-05 ENCOUNTER — HOSPITAL ENCOUNTER (OUTPATIENT)
Dept: LAB | Facility: MEDICAL CENTER | Age: 64
End: 2024-01-05
Payer: COMMERCIAL

## 2024-01-05 DIAGNOSIS — R73.02 IMPAIRED GLUCOSE TOLERANCE: ICD-10-CM

## 2024-01-05 DIAGNOSIS — R19.7 ACUTE DIARRHEA: ICD-10-CM

## 2024-01-05 LAB
ALBUMIN SERPL BCP-MCNC: 4.1 G/DL (ref 3.2–4.9)
ALBUMIN/GLOB SERPL: 1.2 G/DL
ALP SERPL-CCNC: 121 U/L (ref 30–99)
ALT SERPL-CCNC: 26 U/L (ref 2–50)
ANION GAP SERPL CALC-SCNC: 13 MMOL/L (ref 7–16)
AST SERPL-CCNC: 21 U/L (ref 12–45)
BASOPHILS # BLD AUTO: 0.6 % (ref 0–1.8)
BASOPHILS # BLD: 0.04 K/UL (ref 0–0.12)
BILIRUB SERPL-MCNC: 0.3 MG/DL (ref 0.1–1.5)
BUN SERPL-MCNC: 10 MG/DL (ref 8–22)
CALCIUM ALBUM COR SERPL-MCNC: 9 MG/DL (ref 8.5–10.5)
CALCIUM SERPL-MCNC: 9.1 MG/DL (ref 8.5–10.5)
CHLORIDE SERPL-SCNC: 105 MMOL/L (ref 96–112)
CO2 SERPL-SCNC: 23 MMOL/L (ref 20–33)
CREAT SERPL-MCNC: 0.53 MG/DL (ref 0.5–1.4)
EOSINOPHIL # BLD AUTO: 0.18 K/UL (ref 0–0.51)
EOSINOPHIL NFR BLD: 2.6 % (ref 0–6.9)
ERYTHROCYTE [DISTWIDTH] IN BLOOD BY AUTOMATED COUNT: 46.7 FL (ref 35.9–50)
EST. AVERAGE GLUCOSE BLD GHB EST-MCNC: 134 MG/DL
GFR SERPLBLD CREATININE-BSD FMLA CKD-EPI: 102 ML/MIN/1.73 M 2
GLOBULIN SER CALC-MCNC: 3.5 G/DL (ref 1.9–3.5)
GLUCOSE SERPL-MCNC: 99 MG/DL (ref 65–99)
HBA1C MFR BLD: 6.3 % (ref 4–5.6)
HCT VFR BLD AUTO: 42.8 % (ref 37–47)
HGB BLD-MCNC: 14.3 G/DL (ref 12–16)
IMM GRANULOCYTES # BLD AUTO: 0.03 K/UL (ref 0–0.11)
IMM GRANULOCYTES NFR BLD AUTO: 0.4 % (ref 0–0.9)
LYMPHOCYTES # BLD AUTO: 1.88 K/UL (ref 1–4.8)
LYMPHOCYTES NFR BLD: 27.4 % (ref 22–41)
MCH RBC QN AUTO: 29.8 PG (ref 27–33)
MCHC RBC AUTO-ENTMCNC: 33.4 G/DL (ref 32.2–35.5)
MCV RBC AUTO: 89.2 FL (ref 81.4–97.8)
MONOCYTES # BLD AUTO: 0.43 K/UL (ref 0–0.85)
MONOCYTES NFR BLD AUTO: 6.3 % (ref 0–13.4)
NEUTROPHILS # BLD AUTO: 4.3 K/UL (ref 1.82–7.42)
NEUTROPHILS NFR BLD: 62.7 % (ref 44–72)
NRBC # BLD AUTO: 0 K/UL
NRBC BLD-RTO: 0 /100 WBC (ref 0–0.2)
PLATELET # BLD AUTO: 351 K/UL (ref 164–446)
PMV BLD AUTO: 10.9 FL (ref 9–12.9)
POTASSIUM SERPL-SCNC: 4.3 MMOL/L (ref 3.6–5.5)
PROT SERPL-MCNC: 7.6 G/DL (ref 6–8.2)
RBC # BLD AUTO: 4.8 M/UL (ref 4.2–5.4)
SODIUM SERPL-SCNC: 141 MMOL/L (ref 135–145)
TSH SERPL DL<=0.005 MIU/L-ACNC: 1.98 UIU/ML (ref 0.38–5.33)
WBC # BLD AUTO: 6.9 K/UL (ref 4.8–10.8)

## 2024-01-05 PROCEDURE — 84443 ASSAY THYROID STIM HORMONE: CPT

## 2024-01-05 PROCEDURE — 85025 COMPLETE CBC W/AUTO DIFF WBC: CPT

## 2024-01-05 PROCEDURE — 36415 COLL VENOUS BLD VENIPUNCTURE: CPT

## 2024-01-05 PROCEDURE — 80053 COMPREHEN METABOLIC PANEL: CPT

## 2024-01-05 PROCEDURE — 83036 HEMOGLOBIN GLYCOSYLATED A1C: CPT

## 2024-01-12 ENCOUNTER — HOSPITAL ENCOUNTER (OUTPATIENT)
Facility: MEDICAL CENTER | Age: 64
End: 2024-01-12
Payer: COMMERCIAL

## 2024-01-12 DIAGNOSIS — R19.7 ACUTE DIARRHEA: ICD-10-CM

## 2024-01-12 LAB
C DIFF DNA SPEC QL NAA+PROBE: NEGATIVE
C DIFF TOX GENS STL QL NAA+PROBE: NEGATIVE

## 2024-01-12 PROCEDURE — 87045 FECES CULTURE AEROBIC BACT: CPT

## 2024-01-12 PROCEDURE — 87077 CULTURE AEROBIC IDENTIFY: CPT | Mod: 91

## 2024-01-12 PROCEDURE — 87493 C DIFF AMPLIFIED PROBE: CPT

## 2024-01-12 PROCEDURE — 87046 STOOL CULTR AEROBIC BACT EA: CPT

## 2024-01-12 PROCEDURE — 87899 AGENT NOS ASSAY W/OPTIC: CPT

## 2024-01-12 NOTE — RESULT ENCOUNTER NOTE
Kirk Aguilar,    Your results do not show any cause of your diarrhea so far. Has it improved or are you still having troubles?    Best,    Greg Lira MD

## 2024-01-13 LAB
E COLI SXT1+2 STL IA: NORMAL
SIGNIFICANT IND 70042: NORMAL
SITE SITE: NORMAL
SOURCE SOURCE: NORMAL

## 2024-01-15 LAB
BACTERIA STL CULT: NORMAL
E COLI SXT1+2 STL IA: NORMAL
SIGNIFICANT IND 70042: NORMAL
SITE SITE: NORMAL
SOURCE SOURCE: NORMAL

## 2024-01-16 NOTE — RESULT ENCOUNTER NOTE
Kirk Aguilar,    More results have returned. Nothing we have tested for has come back positive. This is good!

## 2024-01-23 ENCOUNTER — OFFICE VISIT (OUTPATIENT)
Dept: MEDICAL GROUP | Facility: CLINIC | Age: 64
End: 2024-01-23
Payer: COMMERCIAL

## 2024-01-23 VITALS
DIASTOLIC BLOOD PRESSURE: 82 MMHG | HEIGHT: 60 IN | SYSTOLIC BLOOD PRESSURE: 120 MMHG | TEMPERATURE: 97.8 F | HEART RATE: 74 BPM | OXYGEN SATURATION: 95 % | WEIGHT: 170 LBS | BODY MASS INDEX: 33.38 KG/M2

## 2024-01-23 DIAGNOSIS — Z13.820 ENCOUNTER FOR SCREENING FOR OSTEOPOROSIS: ICD-10-CM

## 2024-01-23 DIAGNOSIS — R74.8 HIGH ALKALINE PHOSPHATASE: ICD-10-CM

## 2024-01-23 DIAGNOSIS — R73.03 PRE-DIABETES: ICD-10-CM

## 2024-01-23 DIAGNOSIS — R74.8 ELEVATED ALKALINE PHOSPHATASE LEVEL: ICD-10-CM

## 2024-01-23 DIAGNOSIS — F43.21 GRIEF: ICD-10-CM

## 2024-01-23 DIAGNOSIS — F33.0 MILD EPISODE OF RECURRENT MAJOR DEPRESSIVE DISORDER (HCC): ICD-10-CM

## 2024-01-23 DIAGNOSIS — Z13.820 ENCOUNTER FOR OSTEOPOROSIS SCREENING IN ASYMPTOMATIC POSTMENOPAUSAL PATIENT: ICD-10-CM

## 2024-01-23 DIAGNOSIS — Z78.0 ENCOUNTER FOR OSTEOPOROSIS SCREENING IN ASYMPTOMATIC POSTMENOPAUSAL PATIENT: ICD-10-CM

## 2024-01-23 PROBLEM — R73.02 IMPAIRED GLUCOSE TOLERANCE: Status: RESOLVED | Noted: 2017-08-03 | Resolved: 2024-01-23

## 2024-01-23 PROBLEM — L83 ACANTHOSIS NIGRICANS: Status: RESOLVED | Noted: 2017-07-05 | Resolved: 2024-01-23

## 2024-01-23 PROBLEM — M79.671 FOOT PAIN, RIGHT: Status: RESOLVED | Noted: 2017-07-05 | Resolved: 2024-01-23

## 2024-01-23 PROBLEM — R19.7 ACUTE DIARRHEA: Status: RESOLVED | Noted: 2018-11-19 | Resolved: 2024-01-23

## 2024-01-23 PROBLEM — B02.9 SHINGLES: Status: RESOLVED | Noted: 2022-07-14 | Resolved: 2024-01-23

## 2024-01-23 PROCEDURE — 3079F DIAST BP 80-89 MM HG: CPT

## 2024-01-23 PROCEDURE — 3074F SYST BP LT 130 MM HG: CPT

## 2024-01-23 PROCEDURE — 99214 OFFICE O/P EST MOD 30 MIN: CPT | Mod: GC

## 2024-01-23 RX ORDER — METFORMIN HYDROCHLORIDE 500 MG/1
500 TABLET, EXTENDED RELEASE ORAL DAILY
Qty: 90 TABLET | Refills: 3 | Status: SHIPPED | OUTPATIENT
Start: 2024-01-23

## 2024-01-23 ASSESSMENT — PATIENT HEALTH QUESTIONNAIRE - PHQ9
2. FEELING DOWN, DEPRESSED, IRRITABLE, OR HOPELESS: MORE THAN HALF THE DAYS
4. FEELING TIRED OR HAVING LITTLE ENERGY: NEARLY EVERY DAY
8. MOVING OR SPEAKING SO SLOWLY THAT OTHER PEOPLE COULD HAVE NOTICED. OR THE OPPOSITE, BEING SO FIGETY OR RESTLESS THAT YOU HAVE BEEN MOVING AROUND A LOT MORE THAN USUAL: NOT AT ALL
3. TROUBLE FALLING OR STAYING ASLEEP OR SLEEPING TOO MUCH: NEARLY EVERY DAY
6. FEELING BAD ABOUT YOURSELF - OR THAT YOU ARE A FAILURE OR HAVE LET YOURSELF OR YOUR FAMILY DOWN: NEARLY EVERY DAY
1. LITTLE INTEREST OR PLEASURE IN DOING THINGS: MORE THAN HALF THE DAYS
5. POOR APPETITE OR OVEREATING: NEARLY EVERY DAY
SUM OF ALL RESPONSES TO PHQ QUESTIONS 1-9: 16
7. TROUBLE CONCENTRATING ON THINGS, SUCH AS READING THE NEWSPAPER OR WATCHING TELEVISION: NOT AT ALL
SUM OF ALL RESPONSES TO PHQ9 QUESTIONS 1 AND 2: 4
9. THOUGHTS THAT YOU WOULD BE BETTER OFF DEAD, OR OF HURTING YOURSELF: NOT AT ALL

## 2024-01-23 ASSESSMENT — FIBROSIS 4 INDEX: FIB4 SCORE: 0.76

## 2024-01-23 NOTE — PROGRESS NOTES
Subjective:     CC: Labs and depression    HPI:   Lauren presents today with     Problem   Pre-Diabetes    - A1c up to 6.3% in January of 2024. (From 5.9%)  - Diet hasn't changed. Patient not taking previously rxed metformin    Plan:  - Counseled on dietary changes. Cutting back sugar  - Heavily discussed exercise and importance. Pt currently without any exercise. WHO recommendations discussed  - Will start metformin 500 mg XR. To take with food.  - Return in 3 months for repeat A1c check     Grief    - Recent death 1 week ago  - Exacerbated     High Alkaline Phosphatase    Persistently elevated  No other liver markers elevated     Depression    - Sees therapist, helping  - PHQ-9 elevated today. Also with grief from recent loss in family  - Taking sertraline every other day.            1/23/2023     4:00 PM 2/23/2022     4:00 PM   PHQ-9 Screening   Little interest or pleasure in doing things 3 - nearly every day 1 - several days   Feeling down, depressed, or hopeless 3 - nearly every day 1 - several days   Trouble falling or staying asleep, or sleeping too much 3 - nearly every day 1 - several days   Feeling tired or having little energy 1 - several days 1 - several days   Poor appetite or overeating 3 - nearly every day 1 - several days   Feeling bad about yourself - or that you are a failure or have let yourself or your family down 0 - not at all 1 - several days   Trouble concentrating on things, such as reading the newspaper or watching television 0 - not at all 1 - several days   Moving or speaking so slowly that other people could have noticed. Or the opposite - being so fidgety or restless that you have been moving around a lot more than usual 0 - not at all 0 - not at all   Thoughts that you would be better off dead, or of hurting yourself in some way 0 - not at all 0 - not at all   PHQ-2 Total Score 6 2   PHQ-9 Total Score 13 7     Interpretation of PHQ-9 Total Score   Score Severity   1-4 No Depression    5-9 Mild Depression   10-14 Moderate Depression   15-19 Moderately Severe Depression   20-27 Severe Depression      Shingles (Resolved)   Acute Diarrhea (Resolved)    - Acute/persistent  - Started 2 weeks ago. Last week with vomiting - resolved. This week diarrhea worsening.  - Now going 8-10 times a day  - Watery diarrhea. No black or bloody stool. Small volume.  - No recent travel. No antibiotic use. Not camping, + sick contacts -  is sick.  - + Fevers and chills last week. None in greater than 1 week.   - Sulfur smell initially.      Impaired Glucose Tolerance (Resolved)    - Last A1C in August of 6.0%, today of 5.9%  - On metformin 500 mg qday  - Had good lifestyle changes with increased activity and better food choices but then with some weight gain and reversal to diet high in carbs/fats 1-2 months prior to appointment     Foot Pain, Right (Resolved)   Acanthosis Nigricans (Resolved)       Current Outpatient Medications Ordered in Epic   Medication Sig Dispense Refill    sertraline (ZOLOFT) 50 MG Tab Take 1 Tablet by mouth every day. 90 Tablet 3    metFORMIN ER (GLUCOPHAGE XR) 500 MG TABLET SR 24 HR Take 1 Tablet by mouth every day. 90 Tablet 3    gabapentin (NEURONTIN) 100 MG Cap TAKE 1 CAPSULE BY MOUTH AT BEDTIME 30 Capsule 0    EPINEPHrine (EPIPEN) 0.3 MG/0.3ML Solution Auto-injector solution for injection Inject 0.3 mL into the thigh one time for 1 dose. 2 Each 0    Acetaminophen (TYLENOL) 325 MG Cap Tylenol      XIIDRA 5 % Solution   3    ibuprofen (MOTRIN) 800 MG Tab Take 1 Tab by mouth every 8 hours as needed. 30 Tab 0     No current Epic-ordered facility-administered medications on file.         Objective:     Exam:  /82   Pulse 74   Temp 36.6 °C (97.8 °F)   Ht 1.524 m (5')   Wt 77.1 kg (170 lb)   SpO2 95%   BMI 33.20 kg/m²  Body mass index is 33.2 kg/m².    Gen: Alert and oriented, No apparent distress.  HEENT: PERRL, EOMI, NCAT, uvula midline, no exudates  Neck: Neck is supple  without lymphadenopathy.  Lungs: Normal effort, CTA bilaterally, no wheezes, rhonchi, or rales  CV: Regular rate and rhythm. No murmurs, rubs, or gallops.  Abd:  Soft, Non-distended, non-tender  Ext: No clubbing, cyanosis, edema.  Skin: No rashes, no erythema      Labs: Reviewed labs with patient    Assessment & Plan:     63 y.o. female with the following:     Problem List Items Addressed This Visit       High alkaline phosphatase     Likely from bone. DEXA screening due. Will order. Counseled on fractionating alk phos. Pt is interested. Lab placed.         Depression     - Continue seeing therapist  - Start taking zoloft daily. Will trial for 1 year given long-standing untreated depression. Pt does not want to stay on medications if she doesn't have to. Discussed importance of re-evaluating in 1 year.         Relevant Medications    sertraline (ZOLOFT) 50 MG Tab    Grief    Pre-diabetes     Other Visit Diagnoses       Encounter for screening for osteoporosis        Relevant Orders    DS-BONE DENSITY STUDY (DEXA)    Encounter for osteoporosis screening in asymptomatic postmenopausal patient        Relevant Orders    DS-BONE DENSITY STUDY (DEXA)    Elevated alkaline phosphatase level        Relevant Orders    ALKALINE PHOSPHATASE ISOENZYMES                No follow-ups on file.

## 2024-01-23 NOTE — ASSESSMENT & PLAN NOTE
- Continue seeing therapist  - Start taking zoloft daily. Will trial for 1 year given long-standing untreated depression. Pt does not want to stay on medications if she doesn't have to. Discussed importance of re-evaluating in 1 year.

## 2024-01-23 NOTE — ASSESSMENT & PLAN NOTE
Likely from bone. DEXA screening due. Will order. Counseled on fractionating alk phos. Pt is interested. Lab placed.

## 2024-01-25 ENCOUNTER — OFFICE VISIT (OUTPATIENT)
Dept: BEHAVIORAL HEALTH | Facility: CLINIC | Age: 64
End: 2024-01-25
Payer: COMMERCIAL

## 2024-01-25 DIAGNOSIS — F32.0 CURRENT MILD EPISODE OF MAJOR DEPRESSIVE DISORDER WITHOUT PRIOR EPISODE (HCC): ICD-10-CM

## 2024-01-25 PROCEDURE — 90837 PSYTX W PT 60 MINUTES: CPT | Performed by: MARRIAGE & FAMILY THERAPIST

## 2024-01-25 NOTE — PROGRESS NOTES
[facility]   Therapy Progress Note        Name: Lauren Mandel  MRN: [id number]  : 1960  Age: 63 y.o.  Date of assessment: 2024  PCP: Greg Lira M.D.  Persons in attendance: Patient  Total session time: 55 minutes      Topics addressed in psychotherapy include: Met with the patient in office for an individual counseling session.      Content of Therapy:   The patient discussed that her uncle recently passed away and his services will be in [facility].  If she reports that, if that part of the family helped with a recent deaths on her side of the family and so she is providing support there.  Patient discussed that her relationship with her  has significantly improved.  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    Therapeutic Intervention:   This session, the therapeutic focus, was on supporting the patient's progress.  Worked with the patient to explore thoughts and feelings about family support and the deaths over the past year.  Patient demonstrated capacity for emotional growth and self reflection.  Discussed empathy towards other's experiences.  This dictation has been created using voice recognition software and/or scribes. The accuracy of the dictation is limited by the abilities of the software and the expertise of the scribes. I expect there may be some errors of grammar and possibly content. I made every attempt to manually correct the errors within my dictation. However, errors related to voice recognition software and/or scribes may still exist and should be interpreted within the appropriate context.    Objective Observations:   Participation:Active verbal participation, Attentive, and Engaged   Grooming:Casual   Cognition:Alert and Fully Oriented   Eye Contact:Good   Mood:Euthymic and Anxious   Affect:Flexible and Full range   Thought Process:Logical and Goal-directed   Speech:Rate within normal limits and Volume within normal  limits    Current Risk:   Suicide: low   Homicide: low   Self-Harm: low   Relapse: low   Safety Plan Reviewed: not applicable    Care Plan Updated: No    Does patient express agreement with the above plan? Yes     Diagnosis:  1. Current mild episode of major depressive disorder without prior episode (HCC)        Referral appointment(s) scheduled? No       HARSH Montero.

## 2024-02-26 ENCOUNTER — OFFICE VISIT (OUTPATIENT)
Dept: URGENT CARE | Facility: PHYSICIAN GROUP | Age: 64
End: 2024-02-26
Payer: COMMERCIAL

## 2024-02-26 VITALS
SYSTOLIC BLOOD PRESSURE: 138 MMHG | OXYGEN SATURATION: 94 % | RESPIRATION RATE: 16 BRPM | WEIGHT: 168.4 LBS | HEART RATE: 82 BPM | HEIGHT: 60 IN | BODY MASS INDEX: 33.06 KG/M2 | DIASTOLIC BLOOD PRESSURE: 74 MMHG | TEMPERATURE: 97.4 F

## 2024-02-26 DIAGNOSIS — J06.9 BACTERIAL URI: ICD-10-CM

## 2024-02-26 DIAGNOSIS — B96.89 BACTERIAL URI: ICD-10-CM

## 2024-02-26 DIAGNOSIS — J02.9 SORE THROAT: ICD-10-CM

## 2024-02-26 LAB
FLUAV RNA SPEC QL NAA+PROBE: NEGATIVE
FLUBV RNA SPEC QL NAA+PROBE: NEGATIVE
RSV RNA SPEC QL NAA+PROBE: NEGATIVE
S PYO DNA SPEC NAA+PROBE: NOT DETECTED
SARS-COV-2 RNA RESP QL NAA+PROBE: NEGATIVE

## 2024-02-26 PROCEDURE — 3075F SYST BP GE 130 - 139MM HG: CPT | Performed by: PHYSICIAN ASSISTANT

## 2024-02-26 PROCEDURE — 3078F DIAST BP <80 MM HG: CPT | Performed by: PHYSICIAN ASSISTANT

## 2024-02-26 PROCEDURE — 87651 STREP A DNA AMP PROBE: CPT | Performed by: PHYSICIAN ASSISTANT

## 2024-02-26 PROCEDURE — 99214 OFFICE O/P EST MOD 30 MIN: CPT | Performed by: PHYSICIAN ASSISTANT

## 2024-02-26 PROCEDURE — 0241U POCT CEPHEID COV-2, FLU A/B, RSV - PCR: CPT | Performed by: PHYSICIAN ASSISTANT

## 2024-02-26 RX ORDER — AZITHROMYCIN 250 MG/1
TABLET, FILM COATED ORAL
Qty: 6 TABLET | Refills: 0 | Status: SHIPPED | OUTPATIENT
Start: 2024-02-26

## 2024-02-26 RX ORDER — BENZONATATE 100 MG/1
100 CAPSULE ORAL 3 TIMES DAILY PRN
Qty: 30 CAPSULE | Refills: 0 | Status: SHIPPED | OUTPATIENT
Start: 2024-02-26

## 2024-02-26 ASSESSMENT — ENCOUNTER SYMPTOMS
SWEATS: 0
WHEEZING: 0
SORE THROAT: 1
RHINORRHEA: 1
SPUTUM PRODUCTION: 0
MYALGIAS: 0
DIZZINESS: 0
CHILLS: 1
DIARRHEA: 0
SHORTNESS OF BREATH: 0
COUGH: 1
NAUSEA: 0
FEVER: 0
ABDOMINAL PAIN: 0
VOMITING: 0
CARDIOVASCULAR NEGATIVE: 1
PALPITATIONS: 0
HEADACHES: 1

## 2024-02-26 ASSESSMENT — FIBROSIS 4 INDEX: FIB4 SCORE: 0.76

## 2024-02-27 NOTE — PROGRESS NOTES
Subjective     Lauren Mandel is a very pleasant 63 y.o. female who presents with Sore Throat (Painful swallowing, sinus pain, headache, x3 days )            Cough  This is a new problem. The current episode started in the past 7 days. The problem has been unchanged. The problem occurs every few minutes. The cough is Productive of sputum. Associated symptoms include chills, headaches, nasal congestion, postnasal drip, rhinorrhea and a sore throat. Pertinent negatives include no chest pain, ear congestion, ear pain, fever, myalgias, rash, shortness of breath, sweats or wheezing. Treatments tried: Motrin. The treatment provided mild relief. There is no history of asthma or pneumonia.       PMH:  has a past medical history of Grief (2/9/2023).    She has no past medical history of Breast cancer (HCC).  MEDS:   Current Outpatient Medications:     sertraline (ZOLOFT) 50 MG Tab, Take 1 Tablet by mouth every day., Disp: 90 Tablet, Rfl: 3    metFORMIN ER (GLUCOPHAGE XR) 500 MG TABLET SR 24 HR, Take 1 Tablet by mouth every day., Disp: 90 Tablet, Rfl: 3    gabapentin (NEURONTIN) 100 MG Cap, TAKE 1 CAPSULE BY MOUTH AT BEDTIME, Disp: 30 Capsule, Rfl: 0    EPINEPHrine (EPIPEN) 0.3 MG/0.3ML Solution Auto-injector solution for injection, Inject 0.3 mL into the thigh one time for 1 dose., Disp: 2 Each, Rfl: 0    Acetaminophen (TYLENOL) 325 MG Cap, Tylenol, Disp: , Rfl:     XIIDRA 5 % Solution, , Disp: , Rfl: 3    ibuprofen (MOTRIN) 800 MG Tab, Take 1 Tab by mouth every 8 hours as needed., Disp: 30 Tab, Rfl: 0  ALLERGIES:   Allergies   Allergen Reactions    Lac Bovis Unspecified    Pcn [Penicillins]     Sulfa Drugs      SURGHX:   Past Surgical History:   Procedure Laterality Date    HYSTERECTOMY LAPAROSCOPY       SOCHX:  reports that she has never smoked. She has never used smokeless tobacco. She reports that she does not drink alcohol and does not use drugs.  FH: family history is not on file.      Review of Systems    Constitutional:  Positive for chills and malaise/fatigue. Negative for fever.   HENT:  Positive for congestion, postnasal drip, rhinorrhea and sore throat. Negative for ear pain.    Respiratory:  Positive for cough. Negative for sputum production, shortness of breath and wheezing.    Cardiovascular: Negative.  Negative for chest pain, palpitations and leg swelling.   Gastrointestinal:  Negative for abdominal pain, diarrhea, nausea and vomiting.   Musculoskeletal:  Negative for myalgias.   Skin:  Negative for rash.   Neurological:  Positive for headaches. Negative for dizziness.       Medications, Allergies, and current problem list reviewed today in Epic           Objective     /74 (BP Location: Left arm, Patient Position: Sitting, BP Cuff Size: Adult)   Pulse 82   Temp 36.3 °C (97.4 °F) (Temporal)   Resp 16   Ht 1.524 m (5')   Wt 76.4 kg (168 lb 6.4 oz)   SpO2 94%   BMI 32.89 kg/m²      Physical Exam  Vitals and nursing note reviewed.   Constitutional:       General: She is not in acute distress.     Appearance: Normal appearance. She is well-developed. She is not ill-appearing, toxic-appearing or diaphoretic.   HENT:      Head: Normocephalic and atraumatic.      Right Ear: Hearing, tympanic membrane, ear canal and external ear normal. No decreased hearing noted. Tympanic membrane is not erythematous.      Left Ear: Hearing, tympanic membrane, ear canal and external ear normal. No decreased hearing noted. Tympanic membrane is not erythematous.      Nose: Mucosal edema, congestion and rhinorrhea present. Rhinorrhea is purulent.      Right Turbinates: Swollen.      Left Turbinates: Swollen.      Right Sinus: Maxillary sinus tenderness present.      Left Sinus: Maxillary sinus tenderness present.      Mouth/Throat:      Mouth: Mucous membranes are moist.      Dentition: Normal dentition.      Pharynx: Posterior oropharyngeal erythema present. No oropharyngeal exudate.   Eyes:      General: No scleral  icterus.        Right eye: No discharge.         Left eye: No discharge.      Conjunctiva/sclera: Conjunctivae normal.   Cardiovascular:      Rate and Rhythm: Normal rate and regular rhythm.      Pulses: Normal pulses.      Heart sounds: Normal heart sounds. No murmur heard.  Pulmonary:      Effort: Pulmonary effort is normal. No respiratory distress.      Breath sounds: Normal breath sounds. No wheezing, rhonchi or rales.   Musculoskeletal:      Cervical back: Normal range of motion and neck supple. No tenderness.   Lymphadenopathy:      Cervical: Cervical adenopathy present.   Skin:     General: Skin is warm and dry.      Nails: There is no clubbing.   Neurological:      General: No focal deficit present.      Mental Status: She is alert and oriented to person, place, and time. Mental status is at baseline.   Psychiatric:         Mood and Affect: Mood normal.         Behavior: Behavior normal.         Thought Content: Thought content normal.         Judgment: Judgment normal.                             Assessment & Plan     Very pleasant 63-year-old female presenting with URI symptoms for the past several days.  Headache, congestion, facial pain, sore throat and cough.  Fever chills and body aches noted.  No shortness of breath, wheezing, chest pain, leg swelling or calf tenderness.  Eating and drinking without vomiting or diarrhea.  Sick contacts at home.  No pertinent past respiratory history.  Vital signs appropriate.  Nasal congestion and rhinorrhea and postnasal drip noted.  Pharynx erythema and cervical adenopathy appreciated.  Remainder of ENT exam unremarkable.  Lungs are clear without wheezing rhonchi rales or stridor show no lower respiratory involvement.  In clinic COVID, flu, strep, RSV testing negative.  No clinical symptoms/signs of focal bacterial infection.  Patient will be treated for self-limiting viral URI with OTC meds, conservative measures, and symptomatic relief.  ER and red flag symptoms  discussed at length.    Patient was given a contingent antibiotic prescription to fill and use as directed if symptoms progressed as discussed and agreed upon.      1. Sore throat  POCT CEPHEID GROUP A STREP - PCR    POCT CEPHEID COV-2, FLU A/B, RSV - PCR      2. Bacterial URI  azithromycin (ZITHROMAX) 250 MG Tab    benzonatate (TESSALON) 100 MG Cap              I personally reviewed prior external notes and test results pertinent to today's visit. Return to clinic or go to ED if symptoms worsen or persist. Red flag symptoms and indications for ED discussed at length. Patient/Parent/Guardian voices understanding.  AVS with post-visit instructions provided or given verbally.  Follow-up with your primary care provider in 3-5 days. All side effects and potential interactions of prescribed medication discussed including allergic response, GI upset, tendon injury, rash, sedation, OCP effectiveness, etc.    Please note that this dictation was created using voice recognition software. I have made every reasonable attempt to correct obvious errors, but I expect that there are errors of grammar and possibly content that I did not discover before finalizing the note.

## 2024-03-07 ENCOUNTER — HOSPITAL ENCOUNTER (OUTPATIENT)
Dept: LAB | Facility: MEDICAL CENTER | Age: 64
End: 2024-03-07
Payer: COMMERCIAL

## 2024-03-07 DIAGNOSIS — R74.8 ELEVATED ALKALINE PHOSPHATASE LEVEL: ICD-10-CM

## 2024-03-07 PROCEDURE — 36415 COLL VENOUS BLD VENIPUNCTURE: CPT

## 2024-03-07 PROCEDURE — 84075 ASSAY ALKALINE PHOSPHATASE: CPT

## 2024-03-07 PROCEDURE — 84080 ASSAY ALKALINE PHOSPHATASES: CPT

## 2024-03-10 LAB
ALP BONE SERPL-CCNC: 56 U/L (ref 0–55)
ALP ISOS SERPL HS-CCNC: 0 U/L
ALP LIVER SERPL-CCNC: 72 U/L (ref 0–94)
ALP SERPL-CCNC: 128 U/L (ref 40–120)

## 2024-03-18 ENCOUNTER — HOSPITAL ENCOUNTER (OUTPATIENT)
Dept: RADIOLOGY | Facility: MEDICAL CENTER | Age: 64
End: 2024-03-18
Payer: COMMERCIAL

## 2024-03-18 DIAGNOSIS — Z78.0 ENCOUNTER FOR OSTEOPOROSIS SCREENING IN ASYMPTOMATIC POSTMENOPAUSAL PATIENT: ICD-10-CM

## 2024-03-18 DIAGNOSIS — Z13.820 ENCOUNTER FOR SCREENING FOR OSTEOPOROSIS: ICD-10-CM

## 2024-03-18 DIAGNOSIS — Z13.820 ENCOUNTER FOR OSTEOPOROSIS SCREENING IN ASYMPTOMATIC POSTMENOPAUSAL PATIENT: ICD-10-CM

## 2024-03-18 PROCEDURE — 77080 DXA BONE DENSITY AXIAL: CPT

## 2024-03-18 NOTE — RESULT ENCOUNTER NOTE
"Kirk Aguilar,    Our evaluation of the abnormal lab \"alkaline phosphastase\" demonstrates normal liver levels of the enzym and ever so slightly elevated bone fractions. This is likely due to bone breakdown. Reassuringly your dexa scan was normal. I would love to discuss this in person or over video appointment if you have any further concerns."

## 2024-03-18 NOTE — RESULT ENCOUNTER NOTE
Kirk Aguilar,    Good news we did not detect osteopenia or osteoporosis in the DEXA scan.    Best,    Greg Lira MD

## 2024-03-21 ENCOUNTER — TELEMEDICINE (OUTPATIENT)
Dept: BEHAVIORAL HEALTH | Facility: CLINIC | Age: 64
End: 2024-03-21
Payer: COMMERCIAL

## 2024-03-21 DIAGNOSIS — F32.0 CURRENT MILD EPISODE OF MAJOR DEPRESSIVE DISORDER WITHOUT PRIOR EPISODE (HCC): ICD-10-CM

## 2024-03-21 PROCEDURE — 90837 PSYTX W PT 60 MINUTES: CPT | Performed by: MARRIAGE & FAMILY THERAPIST

## 2024-03-21 NOTE — PROGRESS NOTES
[facility]   Therapy Progress Note      This visit was conducted via Zoom using secure and encrypted videoconferencing technology.  The patient was in a private location in the state of [place].  The patient's identity was confirmed and verbal consent was obtained for this virtual visit.  Place of Service: POS 20 -The patient other than home during their visit           Name: Lauren Mandel  MRN: [id number]  : 1960  Age: 63 y.o.  Date of assessment: 3/21/2024  PCP: Greg Lira M.D.  Persons in attendance: Patient  Total session time: 55 minutes    Objective Observations:   Participation:Active verbal participation, Attentive, and Engaged   Grooming:Casual   Cognition:Alert and Fully Oriented   Eye Contact:Good   Mood:Euthymic   Affect:Flexible and Full range   Thought Process:Logical and Goal-directed   Speech:Rate within normal limits and Volume within normal limits    Current Risk:   Suicide: low   Homicide: low   Self-Harm: low   Relapse: low   Safety Plan Reviewed: not applicable    Topics addressed in psychotherapy include: Met with the patient VIA ZOOM for an individual counseling session.      Content of Therapy:   The patient discussed that she was “hanging in there”.  Patient discussed current stressors including a bad day yesterday but a better one today.  Met with the patient in office for an individual counseling session.  The patient reports that her son reached out to her but has not called her in months.  The patient discussed that another employee who was rehired has constantly been asking her questions.  Patient also discussed her boss yelling at her.      Therapeutic Intervention:   This session, the therapeutic focus was on assisting the patient on problem solving skills including setting boundaries with both her boss and the coworker.  Worked with the patient to understand limits.  Attended session discussing positive plans and the future.    This dictation has been  created using voice recognition software and/or scribes. The accuracy of the dictation is limited by the abilities of the software and the expertise of the scribes. I expect there may be some errors of grammar and possibly content. I made every attempt to manually correct the errors within my dictation. However, errors related to voice recognition software and/or scribes may still exist and should be interpreted within the appropriate context.    Care Plan Updated: No    Does patient express agreement with the above plan? Yes     Diagnosis:  1. Current mild episode of major depressive disorder without prior episode (HCC)        Referral appointment(s) scheduled? No       HARSH Montero.

## 2024-03-22 ENCOUNTER — APPOINTMENT (OUTPATIENT)
Dept: MEDICAL GROUP | Facility: CLINIC | Age: 64
End: 2024-03-22
Payer: COMMERCIAL

## 2024-03-22 DIAGNOSIS — R74.8 HIGH ALKALINE PHOSPHATASE: ICD-10-CM

## 2024-04-02 ENCOUNTER — HOSPITAL ENCOUNTER (OUTPATIENT)
Dept: RADIOLOGY | Facility: MEDICAL CENTER | Age: 64
End: 2024-04-02
Payer: COMMERCIAL

## 2024-04-02 DIAGNOSIS — Z12.31 VISIT FOR SCREENING MAMMOGRAM: ICD-10-CM

## 2024-04-02 PROCEDURE — 77067 SCR MAMMO BI INCL CAD: CPT

## 2024-04-04 ENCOUNTER — TELEMEDICINE (OUTPATIENT)
Dept: BEHAVIORAL HEALTH | Facility: CLINIC | Age: 64
End: 2024-04-04
Payer: COMMERCIAL

## 2024-04-04 DIAGNOSIS — F32.0 CURRENT MILD EPISODE OF MAJOR DEPRESSIVE DISORDER WITHOUT PRIOR EPISODE (HCC): ICD-10-CM

## 2024-04-04 PROCEDURE — 90837 PSYTX W PT 60 MINUTES: CPT | Performed by: MARRIAGE & FAMILY THERAPIST

## 2024-04-04 NOTE — PROGRESS NOTES
"[facility]   Therapy Progress Note      This visit was conducted via Zoom using secure and encrypted videoconferencing technology.  The patient was in a private location in the state of [place].  The patient's identity was confirmed and verbal consent was obtained for this virtual visit.  Place of Service:      POS 02 - Virtual visits from a location other than the patient's Home    Name: Lauren Mandel  MRN: [id number]  : 1960  Age: 63 y.o.  Date of assessment: 2024  PCP: Greg Lira M.D.  Persons in attendance: Patient  Total session time: 55 minutes    Objective Observations:   Participation:Active verbal participation, Attentive, and Engaged   Grooming:Casual   Cognition:Alert and Fully Oriented   Eye Contact:Good   Mood:Euthymic   Affect:Flexible and Full range   Thought Process:Logical and Goal-directed   Speech:Rate within normal limits and Volume within normal limits    Current Risk:   Suicide: low   Homicide: low   Self-Harm: low   Relapse: low   Safety Plan Reviewed: not applicable    Topics addressed in psychotherapy include: Completed information for audit and working on the next big project. Possibly wrap up today.   Easter was good.   \"I feel jolanta whn my kids are around\",  is super critical. Discussed conversation skills. 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 Upset her, She self advocated and corrected.    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 Yes     Diagnosis:  1. Current mild episode of major depressive disorder without prior episode (HCC)        Referral appointment(s) scheduled? No       HARSH Montero.    "

## 2024-04-26 ENCOUNTER — APPOINTMENT (OUTPATIENT)
Dept: MEDICAL GROUP | Facility: CLINIC | Age: 64
End: 2024-04-26
Payer: COMMERCIAL

## 2024-04-26 VITALS
RESPIRATION RATE: 16 BRPM | BODY MASS INDEX: 34.16 KG/M2 | HEIGHT: 60 IN | DIASTOLIC BLOOD PRESSURE: 85 MMHG | TEMPERATURE: 97.4 F | HEART RATE: 70 BPM | WEIGHT: 174 LBS | SYSTOLIC BLOOD PRESSURE: 138 MMHG

## 2024-04-26 DIAGNOSIS — R73.03 PRE-DIABETES: ICD-10-CM

## 2024-04-26 DIAGNOSIS — R51.9 FREQUENT HEADACHES: ICD-10-CM

## 2024-04-26 LAB
HBA1C MFR BLD: 5.9 % (ref ?–5.8)
POCT INT CON NEG: NEGATIVE
POCT INT CON POS: POSITIVE

## 2024-04-26 RX ORDER — METFORMIN HYDROCHLORIDE 500 MG/1
500 TABLET, EXTENDED RELEASE ORAL DAILY
Qty: 90 TABLET | Refills: 3 | Status: SHIPPED | OUTPATIENT
Start: 2024-04-26

## 2024-04-26 ASSESSMENT — FIBROSIS 4 INDEX: FIB4 SCORE: 0.76

## 2024-04-26 NOTE — ASSESSMENT & PLAN NOTE
- Likely tension headache. Possibly component related to vision. Less likely other source given lack of n/v/dizziness/neuro changes  - OMT appointment scheduled  - Encouraged patient to see optometrist for new script  - Stretching exercises given.  - Can take up to 1000 mg tylenol every 6 hours for pain  - To return if worsens

## 2024-04-26 NOTE — PROGRESS NOTES
Subjective:     CC: Follow up    HPI:   Lauren presents today with    Problem   Pre-Diabetes    - A1c up to 6.3% in January of 2024. Back down to 5.9% while on 500 metformin.  - Diet hasn't changed and likely worsened in the past 3 months    Plan:  - Counseled on dietary changes. Cutting back sugar  - Heavily discussed exercise and importance. Pt currently without any exercise. WHO recommendations discussed  - Continue metformin.  - Return in 3 months for repeat A1c check     Frequent Headaches    - Like a band across her head  - Stretching upper back can help  - 400 mg motrin resolves  - No vision changes during headaches, however, patient does note that she gets blurry vision at the end of a work-day in front of a computer.  - Muscles in upper back all right. No neurological symptoms into bilateral arms         Current Outpatient Medications Ordered in Epic   Medication Sig Dispense Refill    metFORMIN ER (GLUCOPHAGE XR) 500 MG TABLET SR 24 HR Take 1 Tablet by mouth every day. 90 Tablet 3    Blood Glucose Meter Kit Test blood sugar continuously for 2 weeks. Freestyle Deepika blood glucose monitoring kit or other CGM. 1 Kit 0    benzonatate (TESSALON) 100 MG Cap Take 1 Capsule by mouth 3 times a day as needed for Cough. 30 Capsule 0    sertraline (ZOLOFT) 50 MG Tab Take 1 Tablet by mouth every day. 90 Tablet 3    EPINEPHrine (EPIPEN) 0.3 MG/0.3ML Solution Auto-injector solution for injection Inject 0.3 mL into the thigh one time for 1 dose. 2 Each 0    Acetaminophen (TYLENOL) 325 MG Cap Tylenol      XIIDRA 5 % Solution   3    ibuprofen (MOTRIN) 800 MG Tab Take 1 Tab by mouth every 8 hours as needed. 30 Tab 0     No current Epic-ordered facility-administered medications on file.       Objective:     Exam:  /85   Pulse 70   Temp 36.3 °C (97.4 °F) (Temporal)   Resp 16   Ht 1.524 m (5')   Wt 78.9 kg (174 lb)   BMI 33.98 kg/m²  Body mass index is 33.98 kg/m².    Gen: Alert and oriented, No apparent  distress.  HEENT: PERRL, EOMI, NCAT, uvula midline, no exudates  Neck: Neck is supple without lymphadenopathy. Trapezius and paraspinal muscles taught and tender to palpation.  Lungs: Normal effort, CTA bilaterally, no wheezes, rhonchi, or rales  CV: Regular rate and rhythm. No murmurs, rubs, or gallops.  Abd:  Soft, Non-distended, non-tender  Ext: No clubbing, cyanosis, edema.  Skin: No rashes, no erythema    Labs/imaging: A1c reviewed with patient. Mammo and dexa also discussed.    Assessment & Plan:     63 y.o. female with the following:     Problem List Items Addressed This Visit       Frequent headaches     - Likely tension headache. Possibly component related to vision. Less likely other source given lack of n/v/dizziness/neuro changes  - OMT appointment scheduled  - Encouraged patient to see optometrist for new script  - Stretching exercises given.  - Can take up to 1000 mg tylenol every 6 hours for pain  - To return if worsens         Pre-diabetes     - Continue metformin  - Continue dietary and exercise changes  - Will attempt to get temp CGM for patient so she can evaluate what each meal does to her blood sugar.         Relevant Orders    POCT  A1C (Completed)           No follow-ups on file.

## 2024-05-17 ENCOUNTER — OFFICE VISIT (OUTPATIENT)
Dept: URGENT CARE | Facility: PHYSICIAN GROUP | Age: 64
End: 2024-05-17
Payer: COMMERCIAL

## 2024-05-17 ENCOUNTER — TELEMEDICINE (OUTPATIENT)
Dept: BEHAVIORAL HEALTH | Facility: CLINIC | Age: 64
End: 2024-05-17
Payer: COMMERCIAL

## 2024-05-17 VITALS
WEIGHT: 174 LBS | BODY MASS INDEX: 34.16 KG/M2 | SYSTOLIC BLOOD PRESSURE: 126 MMHG | HEART RATE: 74 BPM | HEIGHT: 60 IN | TEMPERATURE: 98.1 F | RESPIRATION RATE: 16 BRPM | DIASTOLIC BLOOD PRESSURE: 80 MMHG | OXYGEN SATURATION: 95 %

## 2024-05-17 DIAGNOSIS — J06.9 VIRAL URI WITH COUGH: ICD-10-CM

## 2024-05-17 DIAGNOSIS — F32.0 CURRENT MILD EPISODE OF MAJOR DEPRESSIVE DISORDER WITHOUT PRIOR EPISODE (HCC): ICD-10-CM

## 2024-05-17 PROCEDURE — 99213 OFFICE O/P EST LOW 20 MIN: CPT | Performed by: NURSE PRACTITIONER

## 2024-05-17 PROCEDURE — 3079F DIAST BP 80-89 MM HG: CPT | Performed by: NURSE PRACTITIONER

## 2024-05-17 PROCEDURE — 1125F AMNT PAIN NOTED PAIN PRSNT: CPT | Performed by: NURSE PRACTITIONER

## 2024-05-17 PROCEDURE — 90837 PSYTX W PT 60 MINUTES: CPT | Mod: GT | Performed by: MARRIAGE & FAMILY THERAPIST

## 2024-05-17 PROCEDURE — 3074F SYST BP LT 130 MM HG: CPT | Performed by: NURSE PRACTITIONER

## 2024-05-17 ASSESSMENT — ENCOUNTER SYMPTOMS
EYE DISCHARGE: 0
HEADACHES: 1
DIARRHEA: 0
SHORTNESS OF BREATH: 0
CHILLS: 0
FEVER: 0
SPUTUM PRODUCTION: 0
WHEEZING: 0
SORE THROAT: 1
COUGH: 1
MYALGIAS: 1
ORTHOPNEA: 0
NAUSEA: 0

## 2024-05-17 ASSESSMENT — FIBROSIS 4 INDEX: FIB4 SCORE: 0.76

## 2024-05-17 ASSESSMENT — PAIN SCALES - GENERAL: PAINLEVEL: 4=SLIGHT-MODERATE PAIN

## 2024-05-17 NOTE — PROGRESS NOTES
Subjective     Lauren Mandel is a 63 y.o. female who presents with Pharyngitis, Headache, and Nasal Congestion (Since Sunday )            HPI  New problem.  Patient is a 63-year-old female who presents with a week long history of nasal congestion, cough, runny nose, headache, and bodyaches.  She does report improved symptoms today.  She is concerned as she has a continual burning sensation as well as need to clear her throat.  She has been concerned about this as this has been an ongoing issue on and off over several months.  She does have a primary care appointment next week for the other issues.  She is been taking over-the-counter medications for the symptoms.    Lac bovis, Pcn [penicillins], and Sulfa drugs  Current Outpatient Medications on File Prior to Visit   Medication Sig Dispense Refill    metFORMIN ER (GLUCOPHAGE XR) 500 MG TABLET SR 24 HR Take 1 Tablet by mouth every day. 90 Tablet 3    Blood Glucose Meter Kit Test blood sugar continuously for 2 weeks. Freestyle Deepika blood glucose monitoring kit or other CGM. 1 Kit 0    benzonatate (TESSALON) 100 MG Cap Take 1 Capsule by mouth 3 times a day as needed for Cough. 30 Capsule 0    sertraline (ZOLOFT) 50 MG Tab Take 1 Tablet by mouth every day. 90 Tablet 3    EPINEPHrine (EPIPEN) 0.3 MG/0.3ML Solution Auto-injector solution for injection Inject 0.3 mL into the thigh one time for 1 dose. 2 Each 0    Acetaminophen (TYLENOL) 325 MG Cap Tylenol      XIIDRA 5 % Solution   3    ibuprofen (MOTRIN) 800 MG Tab Take 1 Tab by mouth every 8 hours as needed. 30 Tab 0     No current facility-administered medications on file prior to visit.     Social History     Socioeconomic History    Marital status:      Spouse name: Not on file    Number of children: Not on file    Years of education: Not on file    Highest education level: Not on file   Occupational History    Not on file   Tobacco Use    Smoking status: Never    Smokeless tobacco: Never   Vaping Use     Vaping status: Never Used   Substance and Sexual Activity    Alcohol use: No    Drug use: No    Sexual activity: Not on file   Other Topics Concern    Not on file   Social History Narrative    Not on file     Social Determinants of Health     Financial Resource Strain: Not on file   Food Insecurity: Not on file   Transportation Needs: Not on file   Physical Activity: Not on file   Stress: Not on file   Social Connections: Not on file   Intimate Partner Violence: Not on file   Housing Stability: Not on file     Breast Cancer-related family history is not on file.      Review of Systems   Constitutional:  Positive for malaise/fatigue. Negative for chills and fever.   HENT:  Positive for congestion and sore throat.    Eyes:  Negative for discharge.   Respiratory:  Positive for cough. Negative for sputum production, shortness of breath and wheezing.    Cardiovascular:  Negative for chest pain and orthopnea.   Gastrointestinal:  Negative for diarrhea and nausea.   Musculoskeletal:  Positive for myalgias.   Neurological:  Positive for headaches.   Endo/Heme/Allergies:  Negative for environmental allergies.              Objective     /80 (BP Location: Right arm, Patient Position: Sitting, BP Cuff Size: Adult)   Pulse 74   Temp 36.7 °C (98.1 °F) (Temporal)   Resp 16   Ht 1.524 m (5')   Wt 78.9 kg (174 lb)   SpO2 95%   BMI 33.98 kg/m²      Physical Exam  Vitals and nursing note reviewed.   Constitutional:       General: She is not in acute distress.     Appearance: Normal appearance. She is well-developed.   HENT:      Head: Normocephalic and atraumatic.      Right Ear: Ear canal and external ear normal. No middle ear effusion. Tympanic membrane is not injected or perforated.      Left Ear: Ear canal and external ear normal.  No middle ear effusion. Tympanic membrane is not injected or perforated.      Nose: Mucosal edema and congestion present.      Mouth/Throat:      Pharynx: No oropharyngeal exudate or  posterior oropharyngeal erythema.   Eyes:      General:         Right eye: No discharge.         Left eye: No discharge.      Conjunctiva/sclera: Conjunctivae normal.   Cardiovascular:      Rate and Rhythm: Normal rate and regular rhythm.      Heart sounds: Normal heart sounds. No murmur heard.  Pulmonary:      Effort: Pulmonary effort is normal. No respiratory distress.      Breath sounds: Normal breath sounds.   Musculoskeletal:         General: Normal range of motion.      Cervical back: Normal range of motion and neck supple.      Comments: Normal movement of all 4 extremities.   Lymphadenopathy:      Cervical: No cervical adenopathy.      Upper Body:      Right upper body: No supraclavicular adenopathy.      Left upper body: No supraclavicular adenopathy.   Skin:     General: Skin is warm and dry.   Neurological:      Mental Status: She is alert and oriented to person, place, and time.      Gait: Gait normal.   Psychiatric:         Behavior: Behavior normal.         Thought Content: Thought content normal.                             Assessment & Plan        1. Viral URI with cough          Doing better today.  Viral infection- so no indication for antibiotics at this time. 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  Differential diagnosis, natural history, supportive care, and indications for immediate follow-up were discussed.

## 2024-05-17 NOTE — PROGRESS NOTES
[facility]   Therapy Progress Note      This visit was conducted via Zoom using secure and encrypted videoconferencing technology.  The [facility]  The patient's identity was confirmed and verbal consent was obtained for this virtual visit.  Place of Service: POS 10 -The patient is at Home during their visit           Name: Lauren Mandel  MRN: [id number]  : 1960  Age: 63 y.o.  Date of assessment: 2024  PCP: Greg Lira M.D.  Persons in attendance: Patient  Total session time: 58 minutes    Objective Observations:   Participation:Active verbal participation, Attentive, and Engaged   Grooming:Casual   Cognition:Alert and Fully Oriented   Eye Contact:Good   Mood:Euthymic   Affect:Flexible, Full range, and Congruent with content   Thought Process:Logical and Goal-directed   Speech:Rate within normal limits and Volume within normal limits    Current Risk:   Suicide: low   Homicide: low   Self-Harm: low   Relapse: low   Safety Plan Reviewed: not applicable    Topics addressed in psychotherapy include: Met with the patient via zoom for an individual counseling session.      Content of Therapy:   Patient reported that she had an interesting trip to [facility] for her niece's wedding.  She discussed having problems which included leaving 2 hours late and having a flat tire on route.  Patient discussed issues with her son and grandson and reported that she misses their relationship with her grandson.  Patient discussed how her son does not want his ex wife's family at her grandson's graduation celebration    Therapeutic Intervention:   This session, the therapeutic focus was on supporting the patient in her actions.  Discussed choices and accountability.  Validated the patient's decision to set a boundary.  Worked with the patient on continued boundaries and positive affirmations regarding her actions.  This dictation has been created  using voice recognition software and/or scribes. The accuracy of the dictation is limited by the abilities of the software and the expertise of the scribes. I expect there may be some errors of grammar and possibly content. I made every attempt to manually correct the errors within my dictation. However, errors related to voice recognition software and/or scribes may still exist and should be interpreted within the appropriate context.    Care Plan Updated: No    Does patient express agreement with the above plan? Yes     Diagnosis:  1. Current mild episode of major depressive disorder without prior episode (HCC)        Referral appointment(s) scheduled? No       HARSH Montero.

## 2024-05-22 ENCOUNTER — OFFICE VISIT (OUTPATIENT)
Dept: MEDICAL GROUP | Facility: CLINIC | Age: 64
End: 2024-05-22
Payer: COMMERCIAL

## 2024-05-22 VITALS
BODY MASS INDEX: 33.96 KG/M2 | TEMPERATURE: 98.8 F | SYSTOLIC BLOOD PRESSURE: 134 MMHG | HEIGHT: 60 IN | OXYGEN SATURATION: 97 % | HEART RATE: 86 BPM | WEIGHT: 173 LBS | DIASTOLIC BLOOD PRESSURE: 86 MMHG

## 2024-05-22 DIAGNOSIS — M54.2 NECK AND SHOULDER PAIN: ICD-10-CM

## 2024-05-22 DIAGNOSIS — M25.519 NECK AND SHOULDER PAIN: ICD-10-CM

## 2024-05-22 PROCEDURE — 3079F DIAST BP 80-89 MM HG: CPT | Mod: GE

## 2024-05-22 PROCEDURE — 98926 OSTEOPATH MANJ 3-4 REGIONS: CPT | Mod: GE

## 2024-05-22 PROCEDURE — 3075F SYST BP GE 130 - 139MM HG: CPT | Mod: GE

## 2024-05-22 ASSESSMENT — FIBROSIS 4 INDEX: FIB4 SCORE: 0.76

## 2024-05-23 NOTE — PROGRESS NOTES
Patient notified that lab orders are ready. The test orders can be accessed through the computer system at any [facility] lab or call [phone] for lab hours and locations. Patient notified if any lab is preferred to notify the office and they can be picked up at the .

## 2024-05-23 NOTE — PROGRESS NOTES
OMT NOTE    CC: Neck/shoulder pain    HPI:  - onset/context: 1 year ago, no hx of neck injuries or trauma   - location/radiation: bilateral neck, L>R, bilateral shoulders   - timing: constant, worsening over time   - alleviating factors: rest, stretching in the morning   - aggravating factors: working at desk and typing for long periods increases stiffness   - therapies attempted: just stretching at home, self guided   - imaging: none   - associated symptoms: denies associated symptoms      Objective:  Vitals:    05/22/24 1609   BP: 134/86   Pulse: 86   Temp: 37.1 °C (98.8 °F)   SpO2: 97%       Physical Exam:   General: alert, NAD, healthy appearing   HEENT: normocephalic, atraumatic; no scleral icterus  Neck: FROM, no meningeal signs, negative Spurling's test   CV: No cyanosis, no neck vein distension  Resp: No respiratory distress  Skin: pink, dry, warm, no jaundice  Psych: appropriate affect  MSK: strength 5/5 BUE and BLE, no bony spinous process tenderness, negative straight leg raise  Neuro: CN II-XII grossly intact b/l, sensation intact b/l UE and LE    ASSESSMENT/PLAN:  Problem List Items Addressed This Visit    None  Visit Diagnoses       Neck and shoulder pain               Please see procedure note below for OMT findings and treatment.    OMT Performed:   The procedure note of osteopathic manipulative treatment (OMT) was indicated for above findings of somatic dysfunction and was discussed with the patient. Risks, benefits, and alternatives were discussed. Questions were answered to the patient’s satisfaction, and verbal consent was obtained. The following areas of somatic dysfunction were treated with the following modalities:    OMT Exam and Treatment:  Cranium: OA ERRSL treated with suboccipital release, FPR, ME   Cervical: AA L treated with ME; C3-C5 FRLSL treated with FPR and ME; neck soft tissues treated with MFR and soft tissue release   Thoracic: Right paraspinal hypertonicity from T2-T5, treated  with MFR and muscle energy; Left paraspinal hypertonicity (generalized) treated with FPR   Lumbar: QL tenderness and hypertonicity R>L, treated with QL release, MFR   Ribs: Left 1st rib inhaled; treated with inhibition/ME      The patient tolerated the procedure well with good reduction in somatic dysfunction and no obvious post-treatment reactions noted. The patient was counseled that they may experience muscle aches or mild discomfort over the next 24-48 hours. I expressed the importance of hydration. Patient was instructed to call the office or go to the emergency department immediately if symptoms worsen significantly. Patient may apply an ice pack or heat as needed to sore areas for no longer than 20 minutes every 2 hours while awake. Patient should avoid aggravating activity. OTC Ibuprofen or Tylenol may be used as needed for pain    F/u: Follow up in 1 month or as needed for OMT    Prince Cardona DO, PGY-1   [facility]

## 2024-06-04 ENCOUNTER — TELEMEDICINE (OUTPATIENT)
Dept: BEHAVIORAL HEALTH | Facility: CLINIC | Age: 64
End: 2024-06-04
Payer: COMMERCIAL

## 2024-06-04 DIAGNOSIS — F32.0 CURRENT MILD EPISODE OF MAJOR DEPRESSIVE DISORDER WITHOUT PRIOR EPISODE (HCC): ICD-10-CM

## 2024-06-04 PROCEDURE — 90837 PSYTX W PT 60 MINUTES: CPT | Mod: GT | Performed by: MARRIAGE & FAMILY THERAPIST

## 2024-06-05 NOTE — PROGRESS NOTES
[facility]   Therapy Progress Note      This visit was conducted via Zoom using secure and encrypted videoconferencing technology.  The patient was in a private location in the [facility].  The patient's identity was confirmed and verbal consent was obtained for this virtual visit.  Place of Service:          POS 02 - Virtual visits from a location other than the patient's Home    Name: Lauren Mandel  MRN: [id number]  : 1960  Age: 63 y.o.  Date of assessment: 2024  PCP: Greg Lira M.D.  Persons in attendance: Patient  Total session time: 56 minutes    Objective Observations:   Participation:Active verbal participation, Attentive, and Engaged   Grooming:Casual   Cognition:Alert and Fully Oriented   Eye Contact:Good   Mood:Euthymic   Affect:Flexible, Full range, and Congruent with content   Thought Process:Logical and Goal-directed   Speech:Rate within normal limits and Volume within normal limits    Current Risk:   Suicide: low   Homicide: low   Self-Harm: low   Relapse: low   Safety Plan Reviewed: not applicable    Topics addressed in psychotherapy include: Met with the patient via zoom for an individual counseling session.      Content of Therapy:   The Patient reports that she attended her father's birthday party this past weekend in [facility].  Patient discussed that it was good to see family members and reconnect.  Patient reported that her boss has eight knowledge that he is the problem with the inefficiency of the company.  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    Therapeutic Intervention:   This session, the therapeutic focus was on working with the patient to move forward with her roles in the organization.  Worked with the patient on finding the pace and expectations which are acceptable and work with her.  Discuss the changing relationship with her boss and coworkers.    This dictation has been created using voice recognition  software and/or scribes. The accuracy of the dictation is limited by the abilities of the software and the expertise of the scribes. I expect there may be some errors of grammar and possibly content. I made every attempt to manually correct the errors within my dictation. However, errors related to voice recognition software and/or scribes may still exist and should be interpreted within the appropriate context.    Care Plan Updated: No    Does patient express agreement with the above plan? Yes     Diagnosis:  1. Current mild episode of major depressive disorder without prior episode (HCC)        Referral appointment(s) scheduled? No       HARSH Montero.

## 2024-06-12 ENCOUNTER — APPOINTMENT (OUTPATIENT)
Dept: MEDICAL GROUP | Facility: CLINIC | Age: 64
End: 2024-06-12
Payer: COMMERCIAL

## 2024-06-26 ENCOUNTER — PATIENT MESSAGE (OUTPATIENT)
Dept: MEDICAL GROUP | Facility: CLINIC | Age: 64
End: 2024-06-26
Payer: COMMERCIAL

## 2024-07-10 ENCOUNTER — PATIENT MESSAGE (OUTPATIENT)
Dept: MEDICAL GROUP | Facility: CLINIC | Age: 64
End: 2024-07-10
Payer: COMMERCIAL

## 2024-07-11 ENCOUNTER — APPOINTMENT (OUTPATIENT)
Dept: MEDICAL GROUP | Facility: CLINIC | Age: 64
End: 2024-07-11
Payer: COMMERCIAL

## 2024-07-11 ENCOUNTER — TELEMEDICINE (OUTPATIENT)
Dept: BEHAVIORAL HEALTH | Facility: CLINIC | Age: 64
End: 2024-07-11
Payer: COMMERCIAL

## 2024-07-11 VITALS
BODY MASS INDEX: 33.57 KG/M2 | HEIGHT: 60 IN | HEART RATE: 68 BPM | DIASTOLIC BLOOD PRESSURE: 86 MMHG | WEIGHT: 171 LBS | SYSTOLIC BLOOD PRESSURE: 160 MMHG | OXYGEN SATURATION: 92 % | TEMPERATURE: 97.3 F

## 2024-07-11 DIAGNOSIS — M25.519 NECK AND SHOULDER PAIN: ICD-10-CM

## 2024-07-11 DIAGNOSIS — M54.2 NECK AND SHOULDER PAIN: ICD-10-CM

## 2024-07-11 DIAGNOSIS — F32.0 CURRENT MILD EPISODE OF MAJOR DEPRESSIVE DISORDER WITHOUT PRIOR EPISODE (HCC): ICD-10-CM

## 2024-07-11 PROCEDURE — 3079F DIAST BP 80-89 MM HG: CPT

## 2024-07-11 PROCEDURE — 99213 OFFICE O/P EST LOW 20 MIN: CPT | Mod: GE

## 2024-07-11 PROCEDURE — 3077F SYST BP >= 140 MM HG: CPT

## 2024-07-11 PROCEDURE — 90837 PSYTX W PT 60 MINUTES: CPT | Performed by: MARRIAGE & FAMILY THERAPIST

## 2024-07-11 ASSESSMENT — FIBROSIS 4 INDEX: FIB4 SCORE: 0.77

## 2024-07-22 ENCOUNTER — TELEMEDICINE (OUTPATIENT)
Dept: BEHAVIORAL HEALTH | Facility: CLINIC | Age: 64
End: 2024-07-22
Payer: COMMERCIAL

## 2024-07-22 DIAGNOSIS — F32.0 CURRENT MILD EPISODE OF MAJOR DEPRESSIVE DISORDER WITHOUT PRIOR EPISODE (HCC): ICD-10-CM

## 2024-07-22 PROCEDURE — 90837 PSYTX W PT 60 MINUTES: CPT | Performed by: MARRIAGE & FAMILY THERAPIST

## 2024-07-25 ENCOUNTER — OFFICE VISIT (OUTPATIENT)
Dept: MEDICAL GROUP | Facility: CLINIC | Age: 64
End: 2024-07-25
Payer: COMMERCIAL

## 2024-07-25 VITALS
RESPIRATION RATE: 17 BRPM | WEIGHT: 171 LBS | SYSTOLIC BLOOD PRESSURE: 133 MMHG | OXYGEN SATURATION: 94 % | HEIGHT: 60 IN | HEART RATE: 72 BPM | DIASTOLIC BLOOD PRESSURE: 85 MMHG | BODY MASS INDEX: 33.57 KG/M2

## 2024-07-25 DIAGNOSIS — M25.519 NECK AND SHOULDER PAIN: ICD-10-CM

## 2024-07-25 DIAGNOSIS — M54.2 NECK AND SHOULDER PAIN: ICD-10-CM

## 2024-07-25 PROCEDURE — 98927 OSTEOPATH MANJ 5-6 REGIONS: CPT | Mod: GC

## 2024-07-25 ASSESSMENT — FIBROSIS 4 INDEX: FIB4 SCORE: 0.77

## 2024-07-29 ENCOUNTER — HOSPITAL ENCOUNTER (OUTPATIENT)
Dept: RADIOLOGY | Facility: MEDICAL CENTER | Age: 64
End: 2024-07-29
Payer: COMMERCIAL

## 2024-07-29 DIAGNOSIS — M25.519 NECK AND SHOULDER PAIN: ICD-10-CM

## 2024-07-29 DIAGNOSIS — M54.2 NECK AND SHOULDER PAIN: ICD-10-CM

## 2024-07-29 PROCEDURE — 72040 X-RAY EXAM NECK SPINE 2-3 VW: CPT

## 2024-08-29 ENCOUNTER — OFFICE VISIT (OUTPATIENT)
Dept: MEDICAL GROUP | Facility: CLINIC | Age: 64
End: 2024-08-29
Payer: COMMERCIAL

## 2024-08-29 ENCOUNTER — APPOINTMENT (OUTPATIENT)
Dept: MEDICAL GROUP | Facility: CLINIC | Age: 64
End: 2024-08-29
Payer: COMMERCIAL

## 2024-08-29 VITALS
HEART RATE: 76 BPM | DIASTOLIC BLOOD PRESSURE: 100 MMHG | TEMPERATURE: 96.5 F | BODY MASS INDEX: 33.67 KG/M2 | OXYGEN SATURATION: 96 % | WEIGHT: 171.5 LBS | HEIGHT: 60 IN | SYSTOLIC BLOOD PRESSURE: 168 MMHG

## 2024-08-29 DIAGNOSIS — B34.9 ACUTE VIRAL SYNDROME: ICD-10-CM

## 2024-08-29 DIAGNOSIS — M65.342 TRIGGER RING FINGER OF LEFT HAND: ICD-10-CM

## 2024-08-29 DIAGNOSIS — M54.2 NECK PAIN: ICD-10-CM

## 2024-08-29 ASSESSMENT — FIBROSIS 4 INDEX: FIB4 SCORE: 0.77

## 2024-08-29 NOTE — PROGRESS NOTES
OMT NOTE    CC: Neck/shoulder pain/lump on the left finger/URI    HPI:  #Neck/shoulder pain   - onset/context: 1 year ago, no hx of neck injuries or trauma in the past. Has had a long history of neck stiffness especially due to job sitting at desk. Has been better with OMT and chiro. Has been off work recently not sitting at desk and has noticed a drastic change in neck stiffness and pain, reporting mild stiffness today.   - location/radiation: bilateral neck and bilateral shoulders   - timing: constant  - alleviating factors: rest, stretching in the morning   - aggravating factors: working at desk and typing for long periods increases stiffness   - therapies attempted: just stretching at home, self guided   - imaging: cervical plain films 7/2024 w/ DDD at C4-5 otherwise negative   - associated symptoms: denies neuropathic symptoms or weakness     #Lump on left finger  1 month ago noticed a small lump on the palmar proximal pad of the left 4th finger, has been getting worse and larger. Is tender to the touch. Mild redness overlying without swelling. No injuries. Does report associated triggering of the same finger most felt along the PIP joint. No numbness, tingling, or weakness. No fevers.     #Upper respiratory tract infection  Cough with clear productive sputum and rhinorrhea/congestion. Intermittent coughing fits and post tussive emesis. Sx initially started with diarrhea but that has since resolved. No fevers or chest pain but has felt hot. Positive sick contacts including grandchildren who have the same symptoms.  Did home testing for COVID which was negative. Is getting better with time. Taking over the counter medications as needed.      Objective:  Vitals:    08/29/24 1414   BP: (!) 168/100   Pulse: 76   Temp: 35.8 °C (96.5 °F)   SpO2: 96%       Physical Exam:   General: alert, NAD, healthy appearing   HEENT: normocephalic, atraumatic; no scleral icterus; mild congestion and post nasal drip   Neck: No  meningeal signs, negative Spurling's test. Decreased range of motion in all fields which causes pain and discomfort. No midline spinal tenderness.   CV: No cyanosis, no neck vein distension, RRR, no m/r/g   Resp: No respiratory distress, intermittent cough. Lung sounds clear to auscultation bilaterally, no wheezing, rales or rhonchi   Skin: pink, dry, warm, no jaundice  MSK: Area of mobile nodular swelling along the left 4th proximal finger pad with triggering over the PIP joint. No overlying significant erythema, no drainage. No trauma. Strength 5/5 BUE and BLE, no bony spinous process tenderness  Neuro: CN II-XII grossly intact b/l, sensation intact b/l UE and LE    Please see procedure note below for OMT findings and treatment.    OMT Exam and Treatment:  The procedure note of osteopathic manipulative treatment (OMT) was indicated for below findings of somatic dysfunction and was discussed with the patient. Risks, benefits, and alternatives were discussed. Questions were answered to the patient’s satisfaction, and verbal consent was obtained. The following areas of somatic dysfunction were treated with the following modalities:   Cranium: OA hypertonicity treated with suboccipital release, FPR, MFR. Lymphatic MFR to face.  Cervical: Thoracic inlet release via myofascial, inhibition, and steering wheel technique. AA R treated with ME; C3-C5 FSLRL treated with FPR; neck soft tissues treated with MFR and soft tissue release   Thoracic: Bilateral rib raising. Right paraspinal hypertonicity from T2-T5, treated inhibition; Left paraspinal hypertonicity (generalized) treated with inhibition   Ribs: Diffuse decreased ROM treated with respiratory assist, diaphragmatic release      The patient tolerated the procedure well with good reduction in somatic dysfunction and no obvious post-treatment reactions noted. The patient was counseled that they may experience muscle aches or mild discomfort over the next 24-48 hours. I  expressed the importance of hydration. Patient was instructed to call the office or go to the emergency department immediately if symptoms worsen significantly.     ASSESSMENT/PLAN:  1. Acute viral syndrome  Negative COVID test at home. Continue supportive care at home. OMT today focused on lymphatic therapy, as above. Return for worsening symptoms, fever, chest pain, progressive shortness of breath or other symptoms. Routine OMT in 1-2 months.     2. Neck pain  See OMT above. Cervical plain films obtained showing DDD at C4-5 otherwise negative. Continue routine OMT as needed.     3. Trigger ring finger of left hand  Differential includes trigger finger which is most likely, though considered ganglion cyst though atypical region. No concern for abscess.   -Will have patient follow up with Sports Medicine clinic/fellow for additional workup including POCUS +/- aspiration as appropriate.       Prince Cardona DO, PGY-2  [facility]

## 2024-08-29 NOTE — PATIENT INSTRUCTIONS
Please schedule with the Sports Medicine Fellow to have your hand looked at for imaging and/or treatment.     Follow up for OMT and annual in 1 month.

## 2024-09-05 ENCOUNTER — APPOINTMENT (OUTPATIENT)
Dept: MEDICAL GROUP | Facility: CLINIC | Age: 64
End: 2024-09-05
Payer: COMMERCIAL

## 2024-09-05 ENCOUNTER — APPOINTMENT (OUTPATIENT)
Dept: RADIOLOGY | Facility: CLINIC | Age: 64
End: 2024-09-05
Attending: STUDENT IN AN ORGANIZED HEALTH CARE EDUCATION/TRAINING PROGRAM
Payer: COMMERCIAL

## 2024-09-05 VITALS
SYSTOLIC BLOOD PRESSURE: 142 MMHG | TEMPERATURE: 98 F | BODY MASS INDEX: 32.98 KG/M2 | DIASTOLIC BLOOD PRESSURE: 68 MMHG | OXYGEN SATURATION: 94 % | HEIGHT: 60 IN | HEART RATE: 96 BPM | WEIGHT: 168 LBS

## 2024-09-05 DIAGNOSIS — M65.342 TRIGGER RING FINGER OF LEFT HAND: ICD-10-CM

## 2024-09-05 DIAGNOSIS — M25.549 PAIN IN MULTIPLE FINGER JOINTS: ICD-10-CM

## 2024-09-05 PROCEDURE — 3077F SYST BP >= 140 MM HG: CPT | Performed by: STUDENT IN AN ORGANIZED HEALTH CARE EDUCATION/TRAINING PROGRAM

## 2024-09-05 PROCEDURE — 3078F DIAST BP <80 MM HG: CPT | Performed by: STUDENT IN AN ORGANIZED HEALTH CARE EDUCATION/TRAINING PROGRAM

## 2024-09-05 PROCEDURE — 73140 X-RAY EXAM OF FINGER(S): CPT | Mod: TC,LT,GC | Performed by: FAMILY MEDICINE

## 2024-09-05 PROCEDURE — 99213 OFFICE O/P EST LOW 20 MIN: CPT | Mod: GC | Performed by: STUDENT IN AN ORGANIZED HEALTH CARE EDUCATION/TRAINING PROGRAM

## 2024-09-05 ASSESSMENT — FIBROSIS 4 INDEX: FIB4 SCORE: 0.77

## 2024-09-05 NOTE — PROGRESS NOTES
" Subjective:   Lauren Mandel is a 64 y.o. female here for the evaluation and management of Hand Injury (Left hand finger pain/injury/swelling)    Onset: 3.5 weeks ago  Description: Patient was at work at the [employer] [place] when she \"jammed\" her ring and middle finger into a metal filing cabinet.  Felt immediate pain.  Quality/Severity: Ache at rest, sharp with direct pressure to the joints  Progression: Swelling has improved over the last week.  Continues to feel achiness and pain in the joints.  Occasionally experiences triggering of the ring finger but this is intermittent.  Mitigating Factors:  -Medications/Therapies tried:   -Improves: Taping, rest,  -Worsens: Direct contact over the palmar surface of these joints or compression of the fingers such as with typing  Associated Symptoms:  -Endorses: Significant swelling that has improved.  -Denies: Tingling or numbness, weakness of the fingers or in the hand  Pertinent Other Info:  -Right-hand-dominant  -No prior injuries to these fingers, hands or wrist.      Current Outpatient Medications   Medication Sig Dispense Refill    metFORMIN ER (GLUCOPHAGE XR) 500 MG TABLET SR 24 HR Take 1 Tablet by mouth every day. 90 Tablet 3    Blood Glucose Meter Kit Test blood sugar continuously for 2 weeks. Freestyle Deepika blood glucose monitoring kit or other CGM. 1 Kit 0    sertraline (ZOLOFT) 50 MG Tab Take 1 Tablet by mouth every day. 90 Tablet 3    EPINEPHrine (EPIPEN) 0.3 MG/0.3ML Solution Auto-injector solution for injection Inject 0.3 mL into the thigh one time for 1 dose. 2 Each 0    Acetaminophen (TYLENOL) 325 MG Cap Tylenol      XIIDRA 5 % Solution   3    ibuprofen (MOTRIN) 800 MG Tab Take 1 Tab by mouth every 8 hours as needed. 30 Tab 0    benzonatate (TESSALON) 100 MG Cap Take 1 Capsule by mouth 3 times a day as needed for Cough. (Patient not taking: Reported on 9/5/2024) 30 Capsule 0     No current facility-administered medications for this visit.     Allergies  Lac " bovis, Pcn [penicillins], and Sulfa drugs    Past Medical History:   Diagnosis Date    Grief 2/9/2023     Patient Active Problem List    Diagnosis Date Noted    Pre-diabetes 01/23/2024    Grief 02/09/2023    BMI 31.0-31.9,adult 10/20/2022    Current mild episode of major depressive disorder without prior episode (HCC) 08/25/2022    Hives 08/04/2022    Fibromyalgia 07/14/2022    Pain in unspecified joint 07/14/2022    HTN (hypertension) 07/14/2022    Hearing loss 07/14/2022    Diverticulosis 07/14/2022    Acquired absence of both cervix and uterus 07/14/2022    Long COVID 02/23/2022    Preventative health care 02/23/2022    Recurrent sinusitis 02/23/2022    Acute frontal sinusitis 08/27/2021    Rib pain 02/10/2021    Hip pain, right 02/10/2021    Nail abnormality 02/25/2020    Breast pain, right 02/25/2020    Muscle atrophy 06/04/2019    Stress incontinence 02/15/2019    Frequent headaches 11/05/2018    Shortness of breath 11/05/2018    Fatigue 11/05/2018    Vitamin D deficiency 08/03/2017    High alkaline phosphatase 08/03/2017    Plantar fasciitis, bilateral 07/07/2017    Arthralgia of ankle 07/07/2017    Vaginal atrophy 07/05/2017    Overweight with body mass index (BMI) 25.0-29.9 07/05/2017    Atopic dermatitis 09/29/2015    Weight gain 11/13/2014    Eustachian tube dysfunction, bilateral 11/13/2014    Depression 11/13/2014    Sinus congestion 04/10/2014       Past Surgical History  Past Surgical History:   Procedure Laterality Date    HYSTERECTOMY LAPAROSCOPY         Social History     Socioeconomic History    Marital status:      Spouse name: Not on file    Number of children: Not on file    Years of education: Not on file    Highest education level: Not on file   Occupational History    Not on file   Tobacco Use    Smoking status: Never    Smokeless tobacco: Never   Vaping Use    Vaping status: Never Used   Substance and Sexual Activity    Alcohol use: No    Drug use: No    Sexual activity: Not on file    Other Topics Concern    Not on file   Social History Narrative    Not on file     Social Determinants of Health     Financial Resource Strain: Not on file   Food Insecurity: Not on file   Transportation Needs: Not on file   Physical Activity: Not on file   Stress: Not on file   Social Connections: Not on file   Intimate Partner Violence: Not on file   Housing Stability: Not on file        Objective:     Vitals:    09/05/24 0920   BP: (!) 142/68   BP Location: Left arm   Patient Position: Sitting   Pulse: 96   Temp: 36.7 °C (98 °F)   TempSrc: Temporal   SpO2: 94%   Weight: 76.2 kg (168 lb)   Height: 1.524 m (5')     Body mass index is 32.81 kg/m².     GEN: Alert, pleasant, INAD  HEENT: NC/AT, anicteric, no conj. injection.   CV: Good skin color without pallor or cyanosis  PUL: No respiratory distress  EXT: Left hand examined  - Max TTP PIP of the ring finger  -Moderate TTP of the middle ring finger PIP, volar surface of the palm at the A1 pulley of the ring finger, and of the middle finger  - Palpable nodule and active triggering during active/passive flexion of the ring finger.  Small nodule palpable at the base of the 3rd finger but no triggering appreciated.  - No pain with hyperextension of the digits the PIP.  No varus/valgus pain or laxity at the PIP  - Full extension appreciated.  Lack of full flexion of the fingers in the fist due to reported stiffness and pain      Wet read left finger x-rays: No acute fracture within the PIP of the third or fourth digits.  Normal alignment.  Some soft tissue swelling appreciated    Assessment and Plan:   Lauren Mandel is a 64 y.o. female with a Hand Injury (Left hand finger pain/injury/swelling)         1. Pain in multiple finger joints  - DX-FINGER(S) 2+ LEFT  Secondary to compression injury.  Full extension of the fingers appreciated.  No physical evidence of a volar plate injury.  No boutonniere deformity.  No fracture on imaging.  - Continue surekha taping as  needed.  - Discussed home exercise program with extension and flexion of the digits  - Recommend holding hand up and massaging the area to minimize swelling  - Continue ice as needed    2. Trigger ring finger of left hand  Given that these triggering symptoms developed after a mildly traumatic event, recommended conservative approach at this time.  - Continue moving the finger as able, massaging over the A1 pulley.  - If symptoms are not improving or resolved over the next couple weeks, recommended she return for consideration of a trigger finger injection        Patient discussed with attending physician Dr. Trista Mayo (Benny Francis DO   Primary Care Sports Medicine Fellow

## 2024-09-12 ENCOUNTER — APPOINTMENT (OUTPATIENT)
Dept: MEDICAL GROUP | Facility: CLINIC | Age: 64
End: 2024-09-12
Payer: COMMERCIAL

## 2024-09-17 ENCOUNTER — TELEMEDICINE (OUTPATIENT)
Dept: BEHAVIORAL HEALTH | Facility: CLINIC | Age: 64
End: 2024-09-17
Payer: COMMERCIAL

## 2024-09-17 DIAGNOSIS — F32.0 CURRENT MILD EPISODE OF MAJOR DEPRESSIVE DISORDER WITHOUT PRIOR EPISODE (HCC): ICD-10-CM

## 2024-09-17 PROCEDURE — 90837 PSYTX W PT 60 MINUTES: CPT | Mod: GT | Performed by: MARRIAGE & FAMILY THERAPIST

## 2024-09-18 NOTE — PROGRESS NOTES
[facility]   Therapy Progress Note      This visit was conducted via MS Teams using secure and encrypted videoconferencing technology.  The patient was in a private location in the state of [place].  The patient's identity was confirmed and verbal consent was obtained for this virtual visit.  Place of Service:     POS 02 - Virtual visits from a location other than the patient's Home    Name: Lauren Mandel  MRN: [id number]  : 1960  Age: 64 y.o.  Date of assessment: 2024  PCP: Prince Cardona D.O.  Persons in attendance: Patient  Total session time: 57 minutes    Objective Observations:   Participation:Active verbal participation, Attentive, and Engaged   Grooming:Casual   Cognition:Alert and Fully Oriented   Eye Contact:Good   Mood:Euthymic and Anxious   Affect:Flexible, Full range, Congruent with content, and Anxious   Thought Process:Logical and Goal-directed   Speech:Rate within normal limits and Volume within normal limits    Current Risk:   Suicide: low   Homicide: low   Self-Harm: low   Relapse: low   Safety Plan Reviewed: not applicable    Topics addressed in psychotherapy include: Met with the patient via MS Teams for an individual counseling session.      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  Patient discussed that the owner continues will to place a last minute demands on her for things that need to be done immediately.  The patient discussed are stressors as she feels other priorities such as payroll needing to be done.  The patient discussed being challenged by conversations with the owner who wants her to send information to one investing company out of normal channels.  The patient discussed worrying about repercussions from such actions.      Therapeutic Intervention:   This session, the therapeutic focus was on validating and discussing her decision  to maintain doing what she believes is right within her scope of employment.  Discussed with the patient accepting things that are out of her control and possibly allowing important issues to fall by the wayside as she is given different priorities.  Discussed with the patient her decision not to go to our family reunion and validated her feelings of not having to be considered or responsible for others.  Worked with the patient issue discussed working with her  to open up conversations regarding her son.  Patient also discussed that her sister's car had been let on fire in [place].  Discussed unwarranted vandalism  This dictation has been created using voice recognition software and/or scribes. The accuracy of the dictation is limited by the abilities of the software and the expertise of the scribes. I expect there may be some errors of grammar and possibly content. I made every attempt to manually correct the errors within my dictation. However, errors related to voice recognition software and/or scribes may still exist and should be interpreted within the appropriate context.      Care Plan Updated: No    Does patient express agreement with the above plan? Yes     Diagnosis:  1. Current mild episode of major depressive disorder without prior episode (HCC)        Referral appointment(s) scheduled? No       BRYANT Montero

## 2024-09-19 ENCOUNTER — APPOINTMENT (OUTPATIENT)
Dept: MEDICAL GROUP | Facility: CLINIC | Age: 64
End: 2024-09-19
Payer: COMMERCIAL

## 2024-09-26 ENCOUNTER — OFFICE VISIT (OUTPATIENT)
Dept: MEDICAL GROUP | Facility: CLINIC | Age: 64
End: 2024-09-26
Payer: COMMERCIAL

## 2024-09-26 VITALS
BODY MASS INDEX: 33.18 KG/M2 | HEIGHT: 60 IN | OXYGEN SATURATION: 96 % | RESPIRATION RATE: 18 BRPM | HEART RATE: 72 BPM | SYSTOLIC BLOOD PRESSURE: 122 MMHG | WEIGHT: 169 LBS | DIASTOLIC BLOOD PRESSURE: 78 MMHG | TEMPERATURE: 97.1 F

## 2024-09-26 DIAGNOSIS — N90.89 VULVOVAGINAL DRYNESS: ICD-10-CM

## 2024-09-26 DIAGNOSIS — N89.8 VULVOVAGINAL DRYNESS: ICD-10-CM

## 2024-09-26 DIAGNOSIS — Z23 NEED FOR VACCINATION: ICD-10-CM

## 2024-09-26 DIAGNOSIS — Z00.00 ANNUAL PHYSICAL EXAM: ICD-10-CM

## 2024-09-26 RX ORDER — ESTRADIOL 0.1 MG/G
0.5 CREAM VAGINAL DAILY
Qty: 42.5 G | Refills: 1 | Status: SHIPPED | OUTPATIENT
Start: 2024-09-26

## 2024-09-26 ASSESSMENT — PATIENT HEALTH QUESTIONNAIRE - PHQ9: CLINICAL INTERPRETATION OF PHQ2 SCORE: 2

## 2024-09-26 ASSESSMENT — FIBROSIS 4 INDEX: FIB4 SCORE: 0.77

## 2024-09-26 NOTE — PATIENT INSTRUCTIONS
"Start vaginal estrogen cream daily.     If interested, you may schedule an appointment with our psychologist Dr. Dee to discuss options for therapy.     I would recommend Yoga with Agnes every day.     I would recommend that you continue to live a healthy lifestyle and work towards lowering your cholesterol by limiting animal-based foods (cholesterol can be found in foods such as hard cheeses and meats). Continue to eat lean meats including chicken breast, turkey, and fish. Eat plenty of whole grains and plant-based foods.     I have included some lifestyle modification tips below.     The healthiest lifestyle comes from optimizing each of the basic \"pillars of health\":    -healthy nutrition (high fiber, non-processed, largely plant-based diet)  -physical exercise (working up to a goal of 150-300 minutes moderate activity + resistance exercises each week)  -restorative sleep  -stress reduction  -social connection  -avoiding risky substances (e.g. alcohol, tobacco, and drug use).    The American Heart Association recommends 30 minutes per day, five times a week, of moderate intensity exercise (intense enough that you cannot have a conversation with another person) in order to encourage heart health and reduce your overall risk.     Some resources and recommendations for lifestyle medicine and nutrition:    Consider Mediterranean diet    Full plate living:    https://www.fullplateliving.org/    Lifestyle Medicine patient handouts:  https://lifestylemedicine.org/wp-content/uploads/2022/07/Pillar-Booklet.pdf    Check out the documentary \"[place]\"    [employer] food guidelines:  https://www.[employer]/recipes/food-guidelines/    Superfoods handout:    https://[employer]/[email or web address]/uploads/2022/10/Yrbxjjnred-7-3-2022.pdf    "

## 2024-09-26 NOTE — PROGRESS NOTES
[facility]    Subjective:     CC:   Chief Complaint   Patient presents with    Annual Exam     Annual exam        HPI:   Lauren Mandel is a 64 y.o. female who presents for annual exam. She is feeling well and denies any complaints.    Ob-Gyn/ History:    History of hysterectomy over 10 years ago.   No significant bloating/fluid retention, or vaginal discharge. Does report some mild vaginal itching, dryness, and pain with intercourse so has been avoiding sexual contact. Active with 1 partner, .   Post-menopausal bleeding: None   Urinary incontinence: None     Health Maintenance  Osteoporosis Screen/ DEXA: Completed in , negative   Vit D for falls prevention: Yes   Cholesterol Screenin, wnl   Diabetes Screenin.9 in    Diet: Doing better, less fast food. Avoids condiments. Tries to do more veggies and lean meats, fish.   Exercise: None consistently; does get her steps in every day   Substance Abuse: None   Safe in relationship.   Seat belts, bike helmet, gun safety discussed.  Sun protection used.    Cancer screening  Colorectal Cancer Screening: Up to date   Lung Cancer Screening: N/a   Cervical Cancer Screening:  Hysterectomy (with cervix removal) >10 years ago   Breast Cancer Screening: Due 2024     Infectious disease screening/Immunizations  --STI Screening: No   --Practices safe sex.  --HIV Screening: N/a   --Hepatitis C Screening: N/a    --Immunizations:    Influenza: Due    Tetanus: Due    Shingles: n/a, has a history of shingles     She  has a past medical history of Grief (2023).    She has no past medical history of Breast cancer (HCC).  She  has a past surgical history that includes hysterectomy laparoscopy.    History reviewed. No pertinent family history.    Social History     Socioeconomic History    Marital status:      Spouse name: Not on file    Number of children: Not on file    Years of education: Not on file    Highest education level: Not  on file   Occupational History    Not on file   Tobacco Use    Smoking status: Never    Smokeless tobacco: Never   Vaping Use    Vaping status: Never Used   Substance and Sexual Activity    Alcohol use: No    Drug use: No    Sexual activity: Not on file   Other Topics Concern    Not on file   Social History Narrative    Not on file     Social Determinants of Health     Financial Resource Strain: Not on file   Food Insecurity: Not on file   Transportation Needs: Not on file   Physical Activity: Not on file   Stress: Not on file   Social Connections: Not on file   Intimate Partner Violence: Not on file   Housing Stability: Not on file       Patient Active Problem List    Diagnosis Date Noted    Vulvovaginal dryness 09/26/2024    Pre-diabetes 01/23/2024    Grief 02/09/2023    BMI 31.0-31.9,adult 10/20/2022    Current mild episode of major depressive disorder without prior episode (HCC) 08/25/2022    Hives 08/04/2022    Fibromyalgia 07/14/2022    Pain in unspecified joint 07/14/2022    HTN (hypertension) 07/14/2022    Hearing loss 07/14/2022    Diverticulosis 07/14/2022    Acquired absence of both cervix and uterus 07/14/2022    Long COVID 02/23/2022    Preventative health care 02/23/2022    Recurrent sinusitis 02/23/2022    Acute frontal sinusitis 08/27/2021    Rib pain 02/10/2021    Hip pain, right 02/10/2021    Nail abnormality 02/25/2020    Breast pain, right 02/25/2020    Muscle atrophy 06/04/2019    Stress incontinence 02/15/2019    Frequent headaches 11/05/2018    Shortness of breath 11/05/2018    Fatigue 11/05/2018    Vitamin D deficiency 08/03/2017    High alkaline phosphatase 08/03/2017    Plantar fasciitis, bilateral 07/07/2017    Arthralgia of ankle 07/07/2017    Vaginal atrophy 07/05/2017    Overweight with body mass index (BMI) 25.0-29.9 07/05/2017    Atopic dermatitis 09/29/2015    Weight gain 11/13/2014    Eustachian tube dysfunction, bilateral 11/13/2014    Depression 11/13/2014    Sinus congestion  04/10/2014         Current Outpatient Medications   Medication Sig Dispense Refill    estradiol (ESTRACE) 0.1 MG/GM vaginal cream Insert 0.5 g into the vagina every day. 42.5 g 1    metFORMIN ER (GLUCOPHAGE XR) 500 MG TABLET SR 24 HR Take 1 Tablet by mouth every day. 90 Tablet 3    Blood Glucose Meter Kit Test blood sugar continuously for 2 weeks. Freestyle Deepika blood glucose monitoring kit or other CGM. 1 Kit 0    sertraline (ZOLOFT) 50 MG Tab Take 1 Tablet by mouth every day. 90 Tablet 3    EPINEPHrine (EPIPEN) 0.3 MG/0.3ML Solution Auto-injector solution for injection Inject 0.3 mL into the thigh one time for 1 dose. 2 Each 0    Acetaminophen (TYLENOL) 325 MG Cap Tylenol      XIIDRA 5 % Solution   3    ibuprofen (MOTRIN) 800 MG Tab Take 1 Tab by mouth every 8 hours as needed. 30 Tab 0    benzonatate (TESSALON) 100 MG Cap Take 1 Capsule by mouth 3 times a day as needed for Cough. (Patient not taking: Reported on 9/5/2024) 30 Capsule 0     No current facility-administered medications for this visit.     Allergies   Allergen Reactions    Lac Bovis Unspecified    Pcn [Penicillins]     Sulfa Drugs        Review of Systems   Constitutional: Negative for fever, chills and malaise/fatigue.   HENT: Negative for congestion.    Eyes: Negative for pain.    Respiratory: Negative for cough and shortness of breath.  Cardiovascular: Negative for leg swelling.   Gastrointestinal: Negative for nausea, vomiting, abdominal pain and diarrhea.   Genitourinary: Negative for dysuria and hematuria. +Dyspareunia and vaginal dryness  Skin: Negative for rash.   Neurological: Negative for dizziness, focal weakness and headaches.   Endo/Heme/Allergies: Does not bleed easily.   Psychiatric/Behavioral: Negative for depression.  The patient is not nervous/anxious.      Objective:     /78 (BP Location: Left arm, Patient Position: Sitting, BP Cuff Size: Adult)   Pulse 72   Temp 36.2 °C (97.1 °F) (Temporal)   Resp 18   Ht 1.524 m (5')    Wt 76.7 kg (169 lb)   SpO2 96%   BMI 33.01 kg/m²   Body mass index is 33.01 kg/m².  Wt Readings from Last 4 Encounters:   09/26/24 76.7 kg (169 lb)   09/05/24 76.2 kg (168 lb)   08/29/24 77.8 kg (171 lb 8 oz)   07/25/24 77.6 kg (171 lb)       Physical Exam:  Constitutional: Well-developed and well-nourished. Not diaphoretic. No distress.   Skin: Skin is warm and dry. No rash noted.  Head: Atraumatic without lesions.  Eyes: Conjunctivae and extraocular motions are normal. Pupils are equal, round, and reactive to light. No scleral icterus.   Ears:  External ears unremarkable. Tympanic membranes clear and intact.  Nose: Nares patent. Septum midline. Turbinates without erythema nor edema. No discharge.   Neck: Supple, trachea midline. Normal range of motion. No thyromegaly present. No lymphadenopathy--cervical or supraclavicular.  Cardiovascular: Regular rate and rhythm, S1 and S2 without murmur, rubs, or gallops.  Lungs: Normal inspiratory effort, CTA bilaterally, no wheezes/rhonchi/rales  Extremities: No cyanosis, clubbing, erythema, nor edema. Distal pulses intact and symmetric.   Musculoskeletal: All major joints AROM full in all directions without pain.  Neurological: Alert and oriented x 3. DTRs 2+/3 and symmetric. No cranial nerve deficit. 5/5 myotomes. Sensation intact.   Psychiatric:  Behavior, mood, and affect are appropriate.    Assessment and Plan:     1. Annual physical exam  2. Need for vaccination  HCM: Up to date   Immunizations per orders  Recommend Shingles vaccine for hx of shingles in the past   Patient counseled about skin care, diet, supplements, prenatal vitamins, safe sex and exercise.  - Influenza Vaccine, High Dose (65+ Only)  - Tdap Vaccine =>8YO IM    3. Vulvovaginal dryness  Start estradiol cream, follow up regarding symptoms. If continuing to have vaginal itching will need further evaluation and workup for lichen sclerosis.   - estradiol (ESTRACE) 0.1 MG/GM vaginal cream; Insert 0.5 g  into the vagina every day.  Dispense: 42.5 g; Refill: 1       Follow-up: Return in about 1 year (around 9/26/2025).      Prince Cardona DO, PGY-2  [facility]

## 2024-10-01 ENCOUNTER — APPOINTMENT (OUTPATIENT)
Dept: BEHAVIORAL HEALTH | Facility: CLINIC | Age: 64
End: 2024-10-01
Payer: COMMERCIAL

## 2024-10-01 DIAGNOSIS — F32.0 CURRENT MILD EPISODE OF MAJOR DEPRESSIVE DISORDER WITHOUT PRIOR EPISODE (HCC): ICD-10-CM

## 2024-10-01 PROCEDURE — 90837 PSYTX W PT 60 MINUTES: CPT | Mod: GT | Performed by: MARRIAGE & FAMILY THERAPIST

## 2024-10-02 ENCOUNTER — APPOINTMENT (OUTPATIENT)
Dept: PHYSICAL THERAPY | Facility: REHABILITATION | Age: 64
End: 2024-10-02
Payer: COMMERCIAL

## 2024-10-02 DIAGNOSIS — M79.7 FIBROMYALGIA: ICD-10-CM

## 2024-10-09 ENCOUNTER — APPOINTMENT (OUTPATIENT)
Dept: PHYSICAL THERAPY | Facility: REHABILITATION | Age: 64
End: 2024-10-09
Payer: COMMERCIAL

## 2024-10-16 ENCOUNTER — APPOINTMENT (OUTPATIENT)
Dept: PHYSICAL THERAPY | Facility: REHABILITATION | Age: 64
End: 2024-10-16
Payer: COMMERCIAL

## 2024-10-22 ENCOUNTER — APPOINTMENT (OUTPATIENT)
Dept: MEDICAL GROUP | Facility: CLINIC | Age: 64
End: 2024-10-22
Payer: COMMERCIAL

## 2024-10-23 ENCOUNTER — APPOINTMENT (OUTPATIENT)
Dept: PHYSICAL THERAPY | Facility: REHABILITATION | Age: 64
End: 2024-10-23
Payer: COMMERCIAL

## 2024-10-30 ENCOUNTER — APPOINTMENT (OUTPATIENT)
Dept: PHYSICAL THERAPY | Facility: REHABILITATION | Age: 64
End: 2024-10-30
Payer: COMMERCIAL

## 2024-10-31 ENCOUNTER — APPOINTMENT (OUTPATIENT)
Dept: MEDICAL GROUP | Facility: CLINIC | Age: 64
End: 2024-10-31
Payer: COMMERCIAL

## 2024-11-05 ENCOUNTER — APPOINTMENT (OUTPATIENT)
Dept: MEDICAL GROUP | Facility: CLINIC | Age: 64
End: 2024-11-05
Payer: COMMERCIAL

## 2024-11-06 ENCOUNTER — APPOINTMENT (OUTPATIENT)
Dept: PHYSICAL THERAPY | Facility: REHABILITATION | Age: 64
End: 2024-11-06
Payer: COMMERCIAL

## 2024-11-07 ENCOUNTER — APPOINTMENT (OUTPATIENT)
Dept: MEDICAL GROUP | Facility: CLINIC | Age: 64
End: 2024-11-07
Payer: COMMERCIAL

## 2024-11-12 ENCOUNTER — OFFICE VISIT (OUTPATIENT)
Dept: MEDICAL GROUP | Facility: CLINIC | Age: 64
End: 2024-11-12
Payer: COMMERCIAL

## 2024-11-12 VITALS
SYSTOLIC BLOOD PRESSURE: 152 MMHG | OXYGEN SATURATION: 93 % | DIASTOLIC BLOOD PRESSURE: 84 MMHG | HEART RATE: 76 BPM | TEMPERATURE: 97.5 F | HEIGHT: 60 IN | BODY MASS INDEX: 33.4 KG/M2 | WEIGHT: 170.1 LBS

## 2024-11-12 DIAGNOSIS — F41.9 ANXIETY: ICD-10-CM

## 2024-11-12 PROCEDURE — 99213 OFFICE O/P EST LOW 20 MIN: CPT | Mod: GE

## 2024-11-12 RX ORDER — HYDROXYZINE HYDROCHLORIDE 25 MG/1
25 TABLET, FILM COATED ORAL 3 TIMES DAILY PRN
Qty: 30 TABLET | Refills: 0 | Status: SHIPPED | OUTPATIENT
Start: 2024-11-12

## 2024-11-12 ASSESSMENT — FIBROSIS 4 INDEX: FIB4 SCORE: 0.77

## 2024-11-13 ENCOUNTER — APPOINTMENT (OUTPATIENT)
Dept: PHYSICAL THERAPY | Facility: REHABILITATION | Age: 64
End: 2024-11-13
Payer: COMMERCIAL

## 2024-11-13 NOTE — PROGRESS NOTES
[facility]   OMT NOTE    Subjective:     CC: Neck pain    Lauren Mandel is a 64 year old female with a history of chronic neck pain here for OMT.     HPI:  - onset/context: Chronic since1 year ago, no hx of neck injuries or trauma in the past. Has had a long history of neck stiffness especially due to job sitting at desk. Has been better with OMT and chiro.   - More stress over the past week with work which has exacerbated her neck   - location/radiation: bilateral neck and bilateral shoulders   - timing: constant  - alleviating factors: rest, stretching in the morning   - aggravating factors: working at desk and typing for long periods increases stiffness   - therapies attempted: just stretching at home, self guided   - imaging: cervical plain films 7/2024 w/ DDD at C4-5 otherwise negative   - associated symptoms: denies neuropathic symptoms or weakness     Patient Active Problem List   Diagnosis    Long COVID    Preventative health care    Recurrent sinusitis    Frequent headaches    Fibromyalgia    Weight gain    Vitamin D deficiency    Vaginal atrophy    Stress incontinence    Sinus congestion    Shortness of breath    Rib pain    Plantar fasciitis, bilateral    Pain in unspecified joint    Overweight with body mass index (BMI) 25.0-29.9    Nail abnormality    Muscle atrophy    HTN (hypertension)    High alkaline phosphatase    Hearing loss    Hip pain, right    Fatigue    Eustachian tube dysfunction, bilateral    Depression    Diverticulosis    Breast pain, right    Atopic dermatitis    Arthralgia of ankle    Acute frontal sinusitis    Acquired absence of both cervix and uterus    Hives    Current mild episode of major depressive disorder without prior episode (HCC)    BMI 31.0-31.9,adult    Grief    Pre-diabetes    Vulvovaginal dryness       Current Outpatient Medications Ordered in Epic   Medication Sig Dispense Refill    sertraline (ZOLOFT) 50 MG Tab Take 1 Tablet by mouth every day. 100 Tablet  3    hydrOXYzine HCl (ATARAX) 25 MG Tab Take 1 Tablet by mouth 3 times a day as needed for Itching. 30 Tablet 0    estradiol (ESTRACE) 0.1 MG/GM vaginal cream Insert 0.5 g into the vagina every day. 42.5 g 1    metFORMIN ER (GLUCOPHAGE XR) 500 MG TABLET SR 24 HR Take 1 Tablet by mouth every day. 90 Tablet 3    Blood Glucose Meter Kit Test blood sugar continuously for 2 weeks. Freestyle Deepika blood glucose monitoring kit or other CGM. 1 Kit 0    benzonatate (TESSALON) 100 MG Cap Take 1 Capsule by mouth 3 times a day as needed for Cough. (Patient not taking: Reported on 9/5/2024) 30 Capsule 0    EPINEPHrine (EPIPEN) 0.3 MG/0.3ML Solution Auto-injector solution for injection Inject 0.3 mL into the thigh one time for 1 dose. 2 Each 0    Acetaminophen (TYLENOL) 325 MG Cap Tylenol      XIIDRA 5 % Solution   3    ibuprofen (MOTRIN) 800 MG Tab Take 1 Tab by mouth every 8 hours as needed. 30 Tab 0     No current Epic-ordered facility-administered medications on file.         ROS: ROS negative unless otherwise stated in the HPI.     Objective:     Physical Exam:   General: alert, NAD, healthy appearing   HEENT: normocephalic, atraumatic; no scleral icterus  Neck: FROM, no meningeal signs, negative Spurling's test   CV: No cyanosis, no neck vein distension  Resp: No respiratory distress  Skin: pink, dry, warm, no jaundice  Psych: appropriate affect  MSK: strength 5/5 BUE and BLE, no bony spinous process tenderness, negative straight leg raise  Neuro: CN II-XII grossly intact b/l, sensation intact b/l UE and LE     Please see procedure note below for OMT findings and treatment.    OMT Performed:   The procedure note of osteopathic manipulative treatment (OMT) was indicated for above findings of somatic dysfunction and was discussed with the patient. Risks, benefits, and alternatives were discussed. Questions were answered to the patient’s satisfaction, and verbal consent was obtained. The following areas of somatic dysfunction  were treated with the following modalities:    OMT Exam and Treatment:  Cranium: OA hypertonic and tender L>R, treated with suboccipital release with release   Cervical: AA rotated left treated with ME with good improvement. C3 E SRRR treated with FPR; C5 F SLRL treated with muscle energy with good improvement   Thoracic: T2-5 NRLSL treated with ME and FPR with resolution     The patient tolerated the procedure well with good reduction in somatic dysfunction and no obvious post-treatment reactions noted.     Assessment & Plan:     64 y.o. female with the followin. Chronic neck pain  Good improvement with OMT. The patient was counseled that they may experience muscle aches or mild discomfort over the next 24-48 hours. I expressed the importance of hydration. Patient was instructed to call the office or go to the emergency department immediately if symptoms worsen significantly. Patient may apply an ice pack or heat as needed to sore areas for no longer than 20 minutes every 2 hours while awake. Patient should avoid aggravating activity. OTC Ibuprofen or Tylenol may be used as needed for pain    2. Anxiety  Stable on Zoloft. Pt requesting medication switch over to mail order pharmacy; she is on Medicare therefore 100 tablets with refills provided.     F/u: Follow up in 1 month or as needed for OMT    Prince Cardona DO, PGY-2  [facility]

## 2024-11-20 ENCOUNTER — APPOINTMENT (OUTPATIENT)
Dept: PHYSICAL THERAPY | Facility: REHABILITATION | Age: 64
End: 2024-11-20
Payer: COMMERCIAL

## 2024-11-27 ENCOUNTER — APPOINTMENT (OUTPATIENT)
Dept: PHYSICAL THERAPY | Facility: REHABILITATION | Age: 64
End: 2024-11-27
Payer: COMMERCIAL

## 2024-12-04 ENCOUNTER — APPOINTMENT (OUTPATIENT)
Dept: PHYSICAL THERAPY | Facility: REHABILITATION | Age: 64
End: 2024-12-04
Payer: COMMERCIAL

## 2024-12-18 ENCOUNTER — APPOINTMENT (OUTPATIENT)
Dept: PHYSICAL THERAPY | Facility: REHABILITATION | Age: 64
End: 2024-12-18
Payer: COMMERCIAL

## 2024-12-19 ENCOUNTER — TELEMEDICINE (OUTPATIENT)
Dept: BEHAVIORAL HEALTH | Facility: CLINIC | Age: 64
End: 2024-12-19
Payer: COMMERCIAL

## 2024-12-19 DIAGNOSIS — F32.0 CURRENT MILD EPISODE OF MAJOR DEPRESSIVE DISORDER WITHOUT PRIOR EPISODE (HCC): ICD-10-CM

## 2024-12-19 PROCEDURE — 90834 PSYTX W PT 45 MINUTES: CPT | Mod: 95 | Performed by: MARRIAGE & FAMILY THERAPIST

## 2024-12-19 NOTE — PROGRESS NOTES
[facility]   Therapy Progress Note      This visit was conducted via MS Teams using secure and encrypted videoconferencing technology.  The patient was in a private location in the state of [place].  The patient's identity was confirmed and verbal consent was obtained for this virtual visit.  Place of Service:   POS 10 -The patient is at Home during their visit             Name: Lauren Mandel  MRN: [id number]  : 1960  Age: 64 y.o.  Date of assessment: 2024  PCP: Prince Cardona D.O.  Persons in attendance: Patient  Total session time: 45 minutes    Objective Observations:   Participation:Active verbal participation, Attentive, and Engaged   Grooming:Casual   Cognition:Alert and Fully Oriented   Eye Contact:Good   Mood:Euthymic   Affect:Flexible and Full range   Thought Process:Logical and Goal-directed   Speech:Rate within normal limits and Volume within normal limits    Current Risk:   Suicide: low   Homicide: low   Self-Harm: low   Relapse: low   Safety Plan Reviewed: not applicable    Topics addressed in psychotherapy include:   Met with the patient via MS Teams for an individual counseling session.  The patient was last seen on 2024.  Content of Therapy:   The Patient discussed that she is feeling multiple stressors and was up late last night.  Patient reports that she is in a bad place, as her jeep needs to be smog and January and has a check engine light on.  Patient discussed that there's their car also needs work to pass smog.  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  Patient reported that she has had to take out a personal loan and is calling creditors to make arrangements.  Patient discuss that it bothers her to go into stores.  Patient reports that she feels people are rude and mean.  Patient discussed the loss and other person upfront had work due to a coworker which is created a situation where she is  forced to communicate more.    Therapeutic Intervention:   This session, the therapeutic focus was on validating the patient's current stress level and challenging belief systems.  Discussed with the patient self care and the need to create change that will impact are daily life.  Worked with the patient on managing stress, building strategies to maintain mental health, and increasing skills of assertive communication.  Impression:   Current mild episode of major depressive disorder without prior episode (HCC) - increasing   This dictation has been created using voice recognition software and/or scribes. The accuracy of the dictation is limited by the abilities of the software and the expertise of the scribes. I expect there may be some errors of grammar and possibly content. I made every attempt to manually correct the errors within my dictation. However, errors related to voice recognition software and/or scribes may still exist and should be interpreted within the appropriate context.p late last night   In a bad place. Jeep needs to be smogged in Jan, Check engine light. Spare car needed work to pass smog. Behind on bills. Working late spending extra on door dash. Has had to take a personal day to call creditors.   Bothers gher to go into stores. People are mean and rude Lost another person up front at work due to coworker. Being forced to communicate better.     Care Plan Updated: Yes    Does patient express agreement with the above plan? Yes     Diagnosis:  1. Current mild episode of major depressive disorder without prior episode (HCC)        Referral appointment(s) scheduled? No       BRYANT Montero

## 2025-01-01 ENCOUNTER — APPOINTMENT (OUTPATIENT)
Dept: PHYSICAL THERAPY | Facility: REHABILITATION | Age: 65
End: 2025-01-01
Payer: COMMERCIAL

## 2025-01-08 ENCOUNTER — APPOINTMENT (OUTPATIENT)
Dept: PHYSICAL THERAPY | Facility: REHABILITATION | Age: 65
End: 2025-01-08
Payer: COMMERCIAL

## 2025-01-15 ENCOUNTER — APPOINTMENT (OUTPATIENT)
Dept: PHYSICAL THERAPY | Facility: REHABILITATION | Age: 65
End: 2025-01-15
Payer: COMMERCIAL

## 2025-01-17 NOTE — ASSESSMENT & PLAN NOTE
- Continue metformin  - Continue dietary and exercise changes  - Will attempt to get temp CGM for patient so she can evaluate what each meal does to her blood sugar.   Patient seen and examined at bedside.    --Anticoagulation--    T(C): 36.9 (01-17-25 @ 19:00), Max: 37 (01-17-25 @ 04:20)  HR: 60 (01-17-25 @ 22:45) (48 - 86)  BP: 123/65 (01-17-25 @ 05:00) (119/53 - 142/60)  RR: 17 (01-17-25 @ 22:45) (12 - 22)  SpO2: 92% (01-17-25 @ 22:45) (91% - 100%)  Wt(kg): --    Exam: Awake, Ox3, no facial, no drift, FC, SAM 5/5 , numbness in LLE

## 2025-01-22 ENCOUNTER — APPOINTMENT (OUTPATIENT)
Dept: PHYSICAL THERAPY | Facility: REHABILITATION | Age: 65
End: 2025-01-22
Payer: COMMERCIAL

## 2025-01-29 ENCOUNTER — APPOINTMENT (OUTPATIENT)
Dept: PHYSICAL THERAPY | Facility: REHABILITATION | Age: 65
End: 2025-01-29
Payer: COMMERCIAL

## 2025-01-30 ENCOUNTER — TELEMEDICINE (OUTPATIENT)
Dept: BEHAVIORAL HEALTH | Facility: CLINIC | Age: 65
End: 2025-01-30
Payer: COMMERCIAL

## 2025-01-30 DIAGNOSIS — F32.0 CURRENT MILD EPISODE OF MAJOR DEPRESSIVE DISORDER WITHOUT PRIOR EPISODE (HCC): ICD-10-CM

## 2025-01-30 PROCEDURE — 90837 PSYTX W PT 60 MINUTES: CPT | Performed by: MARRIAGE & FAMILY THERAPIST

## 2025-01-30 NOTE — PROGRESS NOTES
[facility]   Therapy Progress Note      This visit was conducted via MS Teams using secure and encrypted videoconferencing technology.  The patient was in a private location in the state of [place].  The patient's identity was confirmed and verbal consent was obtained for this virtual visit.  Place of Service:   POS 10 -The patient is at Home during their visit             Name: Lauren Mandel  MRN: [id number]  : 1960  Age: 64 y.o.  Date of assessment: 2025  PCP: Prince Cardona D.O.  Persons in attendance: Patient  Total session time: 56 minutes    Objective Observations:   Participation:{[facility] PARTICIPATION MEASURES:[id number]}   Grooming:{[facility]}   Cognition:{[facility] ORIENTATION:05951783}   Eye Contact:{[facility] EYE CONTACT:[id number]}   Mood:{[facility] MOOD:59772450}   Affect:{[facility] AFFECT:52230064}   Thought Process:{[facility] THOUGHT PROCESS:04991946}   Speech:{[facility] SPEECH:61052198}    Current Risk:   Suicide: {Desc; low/moderate/high:011790}   Homicide: {Desc; low/moderate/high:713410}   Self-Harm: {Desc; low/moderate/high:266274}   Relapse: {Desc; low/moderate/high:623516}   Safety Plan Reviewed: {Response; yes/no/na:49723}    Topics addressed in psychotherapy include: Lots going on, Work is the same, Some employees have reached out asking her about prayer. H hyperfocus on cleanng out office. Family doing well. Still struggles with finances,New vehicle. Started a 15 week prayer woorkshop. Started journaling. Learning to silence mind.    Discussed only 168 hours in a week. Using AND.    Care Plan Updated: {Yes/No/WC:649719}    Does patient express agreement with the above plan? {Yes/No/WC:156664}     Diagnosis:  1. Current mild episode of major depressive disorder without prior episode (HCC)        Referral appointment(s) scheduled? {Yes/No/WC:744571}       BRYANT Montero     on validating the patient's positive efforts to become more grounded.  Discussed with the patient the 168 hour week with a focus on realistic expectations.  Discussed with the patient not using the word “but” and replacing it with “and”.    Plan:  Continue working with the patient on managing emotions and expectations.  Impression:   1. Current mild episode of major depressive disorder without prior episode (HCC) - maintaining   This dictation has been created using voice recognition software and/or scribes. The accuracy of the dictation is limited by the abilities of the software and the expertise of the scribes. I expect there may be some errors of grammar and possibly content. I made every attempt to manually correct the errors within my dictation. However, errors related to voice recognition software and/or scribes may still exist and should be interpreted within the appropriate context.    Care Plan Updated: No    Does patient express agreement with the above plan? Yes     Diagnosis:  1. Current mild episode of major depressive disorder without prior episode (HCC)        Referral appointment(s) scheduled? No       HARSH Montero.

## 2025-02-05 ENCOUNTER — APPOINTMENT (OUTPATIENT)
Dept: PHYSICAL THERAPY | Facility: REHABILITATION | Age: 65
End: 2025-02-05
Payer: COMMERCIAL

## 2025-02-12 ENCOUNTER — APPOINTMENT (OUTPATIENT)
Dept: PHYSICAL THERAPY | Facility: REHABILITATION | Age: 65
End: 2025-02-12
Payer: COMMERCIAL

## 2025-02-19 ENCOUNTER — APPOINTMENT (OUTPATIENT)
Dept: PHYSICAL THERAPY | Facility: REHABILITATION | Age: 65
End: 2025-02-19
Payer: COMMERCIAL

## 2025-02-21 ENCOUNTER — TELEMEDICINE (OUTPATIENT)
Dept: BEHAVIORAL HEALTH | Facility: CLINIC | Age: 65
End: 2025-02-21
Payer: COMMERCIAL

## 2025-02-21 DIAGNOSIS — F32.0 CURRENT MILD EPISODE OF MAJOR DEPRESSIVE DISORDER WITHOUT PRIOR EPISODE (HCC): ICD-10-CM

## 2025-02-21 NOTE — PROGRESS NOTES
[facility]   Therapy Progress Note      This visit was conducted via MS Teams using secure and encrypted videoconferencing technology.  The patient was in a private location in the state of [place].  The patient's identity was confirmed and verbal consent was obtained for this virtual visit.  Place of Service:   POS 10 -The patient is at Home during their visit             Name: Lauren Mandel  MRN: [id number]  : 1960  Age: 64 y.o.  Date of assessment: 2025  PCP: Prince Cardona D.O.  Persons in attendance: Patient  Total session time: 56 minutes    Objective Observations:   Participation:Active verbal participation, Attentive, Engaged, and Open to feedback   Grooming:Casual   Cognition:Alert and Fully Oriented   Eye Contact:Good   Mood:Euthymic   Affect:Flexible and Full range   Thought Process:Logical and Goal-directed   Speech:Rate within normal limits and Volume within normal limits    Current Risk:   Suicide: low   Homicide: low   Self-Harm: low   Relapse: low   Safety Plan Reviewed: not applicable    Topics addressed in psychotherapy include:   Met with the patient via MS Teams for an individual therapy session.  The patient was last seen by this clinician on 2025.  Content of Therapy:   The patient discussed that she is busy doing virtual interviews for her position.  Patient discussed that while she would like to stay on the company owner posted her position and is wanting her to hire a person who may or may not stay on with the new owners.  Patient discussed that she has concerns regarding hiring that person and their relationship with [facility] front office staff who she refers to as “being a bully”.  The patient discussed that she is having difficulty in finding some meaning from her bible studies at times.    Therapeutic Intervention:   This session, the therapeutic focus was on self care has it appears that she will need to complete her own job search.  Discussed with  the patient fatigue levels as related to self care.  Worked with the patient on managing balance of her personal and professional life.  Discussed different views of her needs from this work position.  Plan:  Continue work with the patient in self care and finding direction in her life.  Impression:   1. Current mild episode of major depressive disorder without prior episode (HCC) - maintaining    This dictation has been created using voice recognition software and/or scribes. The accuracy of the dictation is limited by the abilities of the software and the expertise of the scribes. I expect there may be some errors of grammar and possibly content. I made every attempt to manually correct the errors within my dictation. However, errors related to voice recognition software and/or scribes may still exist and should be interpreted within the appropriate context.    Care Plan Updated: No    Does patient express agreement with the above plan? Yes     Diagnosis:  1. Current mild episode of major depressive disorder without prior episode (HCC)        Referral appointment(s) scheduled? No       HARSH Montero.

## 2025-02-26 ENCOUNTER — APPOINTMENT (OUTPATIENT)
Dept: PHYSICAL THERAPY | Facility: REHABILITATION | Age: 65
End: 2025-02-26
Payer: COMMERCIAL

## 2025-04-07 ENCOUNTER — APPOINTMENT (OUTPATIENT)
Dept: BEHAVIORAL HEALTH | Facility: CLINIC | Age: 65
End: 2025-04-07
Payer: COMMERCIAL

## 2025-04-28 ENCOUNTER — TELEMEDICINE (OUTPATIENT)
Dept: BEHAVIORAL HEALTH | Facility: CLINIC | Age: 65
End: 2025-04-28
Payer: COMMERCIAL

## 2025-04-28 DIAGNOSIS — F32.0 CURRENT MILD EPISODE OF MAJOR DEPRESSIVE DISORDER WITHOUT PRIOR EPISODE (HCC): ICD-10-CM

## 2025-04-28 NOTE — PROGRESS NOTES
[facility]   Therapy Progress Note      This visit was conducted via MS Teams using secure and encrypted videoconferencing technology.  The patient was in a private location in the state of [place].  The patient's identity was confirmed and verbal consent was obtained for this virtual visit.  Place of Service:   POS 10 -The patient is at Home during their visit             Name: Lauren Mandel  MRN: [id number]  : 1960  Age: 64 y.o.  Date of assessment: 2025  PCP: Prince Cardona D.O.  Persons in attendance: Patient  Total session time: 45 minutes    Objective Observations:   Participation:Active verbal participation, Attentive, and Engaged   Grooming:Casual   Cognition:Alert and Fully Oriented   Eye Contact:Good   Mood:Euthymic   Affect:Flexible, Full range, and Congruent with content   Thought Process:Logical and Goal-directed   Speech:Rate within normal limits and Volume within normal limits    Current Risk:   Suicide: low   Homicide: low   Self-Harm: low   Relapse: low   Safety Plan Reviewed: not applicable    Topics addressed in psychotherapy include: Met with the patient via MS Teams for an individual therapy session.  The patient was last seen by this clinician on 2025.  Content of Therapy:   The patient discussed that she is having increased anxiet with work staff. She feels that her boss is playing games. Patient feels un trusted by him.     Daughter has custody of her 19 year old grandson, Bio mom,has a history of substance use and legal issues. Grandson wants to be near his half sister to protect her. He has moved in with his bio-mom in [place]. Patient is fearful for him.   Discussed her sons meeting with her father for a fishing trip. The patient reported that this was a very fulfilling trip. Patient discussed her spiritual growth and using AI.      Therapeutic Intervention:   This session, the therapeutic focus was on validating her decisions and feelings. Discussed  her grandsons learning experience. 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 Discussed how her boss continues to micro manage the training    Plan:  Continue work with the patient in self care and finding direction in her life.    Impression:   1.Current mild episode of major depressive disorder without prior episode (HCC) - increasing     This dictation has been created using voice recognition software and/or scribes. The accuracy of the dictation is limited by the abilities of the software and the expertise of the scribes. I expect there may be some errors of grammar and possibly content. I made every attempt to manually correct the errors within my dictation. However, errors related to voice recognition software and/or scribes may still exist and should be interpreted within the appropriate context.    Care Plan Updated: No    Does patient express agreement with the above plan? Yes     Diagnosis:  1. Current mild episode of major depressive disorder without prior episode (HCC)        Referral appointment(s) scheduled? No       HARSH Montero.

## 2025-07-02 ENCOUNTER — APPOINTMENT (OUTPATIENT)
Dept: TELEHEALTH | Facility: TELEMEDICINE | Age: 65
End: 2025-07-02
Payer: COMMERCIAL

## 2025-07-02 ENCOUNTER — HOSPITAL ENCOUNTER (EMERGENCY)
Facility: MEDICAL CENTER | Age: 65
End: 2025-07-03
Attending: STUDENT IN AN ORGANIZED HEALTH CARE EDUCATION/TRAINING PROGRAM
Payer: COMMERCIAL

## 2025-07-02 ENCOUNTER — OFFICE VISIT (OUTPATIENT)
Dept: URGENT CARE | Facility: PHYSICIAN GROUP | Age: 65
End: 2025-07-02
Payer: COMMERCIAL

## 2025-07-02 VITALS
WEIGHT: 172 LBS | SYSTOLIC BLOOD PRESSURE: 130 MMHG | RESPIRATION RATE: 18 BRPM | DIASTOLIC BLOOD PRESSURE: 80 MMHG | HEIGHT: 60 IN | TEMPERATURE: 98 F | HEART RATE: 74 BPM | BODY MASS INDEX: 33.77 KG/M2 | OXYGEN SATURATION: 100 %

## 2025-07-02 DIAGNOSIS — R10.32 BILATERAL LOWER ABDOMINAL CRAMPING: ICD-10-CM

## 2025-07-02 DIAGNOSIS — R19.7 DIARRHEA, UNSPECIFIED TYPE: ICD-10-CM

## 2025-07-02 DIAGNOSIS — R10.31 BILATERAL LOWER ABDOMINAL CRAMPING: ICD-10-CM

## 2025-07-02 DIAGNOSIS — R19.7 DIARRHEA, UNSPECIFIED TYPE: Primary | ICD-10-CM

## 2025-07-02 DIAGNOSIS — R10.84 GENERALIZED ABDOMINAL PAIN: Primary | ICD-10-CM

## 2025-07-02 LAB
ALBUMIN SERPL BCP-MCNC: 4.4 G/DL (ref 3.2–4.9)
ALBUMIN/GLOB SERPL: 1.2 G/DL
ALP SERPL-CCNC: 120 U/L (ref 30–99)
ALT SERPL-CCNC: 19 U/L (ref 2–50)
ANION GAP SERPL CALC-SCNC: 13 MMOL/L (ref 7–16)
APPEARANCE UR: CLEAR
AST SERPL-CCNC: 24 U/L (ref 12–45)
BACTERIA #/AREA URNS HPF: ABNORMAL /HPF
BASOPHILS # BLD AUTO: 0.8 % (ref 0–1.8)
BASOPHILS # BLD: 0.06 K/UL (ref 0–0.12)
BILIRUB SERPL-MCNC: 0.3 MG/DL (ref 0.1–1.5)
BILIRUB UR QL STRIP.AUTO: NEGATIVE
BUN SERPL-MCNC: 13 MG/DL (ref 8–22)
CALCIUM ALBUM COR SERPL-MCNC: 9.3 MG/DL (ref 8.5–10.5)
CALCIUM SERPL-MCNC: 9.6 MG/DL (ref 8.5–10.5)
CASTS URNS QL MICRO: ABNORMAL /LPF (ref 0–2)
CHLORIDE SERPL-SCNC: 103 MMOL/L (ref 96–112)
CO2 SERPL-SCNC: 23 MMOL/L (ref 20–33)
COLOR UR: YELLOW
CREAT SERPL-MCNC: 0.72 MG/DL (ref 0.5–1.4)
EOSINOPHIL # BLD AUTO: 0.21 K/UL (ref 0–0.51)
EOSINOPHIL NFR BLD: 2.8 % (ref 0–6.9)
EPITHELIAL CELLS 1715: ABNORMAL /HPF (ref 0–5)
ERYTHROCYTE [DISTWIDTH] IN BLOOD BY AUTOMATED COUNT: 47.2 FL (ref 35.9–50)
GFR SERPLBLD CREATININE-BSD FMLA CKD-EPI: 92 ML/MIN/1.73 M 2
GLOBULIN SER CALC-MCNC: 3.6 G/DL (ref 1.9–3.5)
GLUCOSE SERPL-MCNC: 86 MG/DL (ref 65–99)
GLUCOSE UR STRIP.AUTO-MCNC: NEGATIVE MG/DL
HCT VFR BLD AUTO: 45.5 % (ref 37–47)
HGB BLD-MCNC: 15 G/DL (ref 12–16)
IMM GRANULOCYTES # BLD AUTO: 0.03 K/UL (ref 0–0.11)
IMM GRANULOCYTES NFR BLD AUTO: 0.4 % (ref 0–0.9)
KETONES UR STRIP.AUTO-MCNC: NEGATIVE MG/DL
LEUKOCYTE ESTERASE UR QL STRIP.AUTO: NEGATIVE
LIPASE SERPL-CCNC: 47 U/L (ref 11–82)
LYMPHOCYTES # BLD AUTO: 2.04 K/UL (ref 1–4.8)
LYMPHOCYTES NFR BLD: 27.5 % (ref 22–41)
MCH RBC QN AUTO: 29.9 PG (ref 27–33)
MCHC RBC AUTO-ENTMCNC: 33 G/DL (ref 32.2–35.5)
MCV RBC AUTO: 90.8 FL (ref 81.4–97.8)
MICRO URNS: ABNORMAL
MONOCYTES # BLD AUTO: 0.75 K/UL (ref 0–0.85)
MONOCYTES NFR BLD AUTO: 10.1 % (ref 0–13.4)
NEUTROPHILS # BLD AUTO: 4.34 K/UL (ref 1.82–7.42)
NEUTROPHILS NFR BLD: 58.4 % (ref 44–72)
NITRITE UR QL STRIP.AUTO: NEGATIVE
NRBC # BLD AUTO: 0 K/UL
NRBC BLD-RTO: 0 /100 WBC (ref 0–0.2)
PH UR STRIP.AUTO: 5.5 [PH] (ref 5–8)
PLATELET # BLD AUTO: 315 K/UL (ref 164–446)
PMV BLD AUTO: 10.7 FL (ref 9–12.9)
POTASSIUM SERPL-SCNC: 4.3 MMOL/L (ref 3.6–5.5)
PROT SERPL-MCNC: 8 G/DL (ref 6–8.2)
PROT UR QL STRIP: NEGATIVE MG/DL
RBC # BLD AUTO: 5.01 M/UL (ref 4.2–5.4)
RBC # URNS HPF: ABNORMAL /HPF
RBC UR QL AUTO: ABNORMAL
SODIUM SERPL-SCNC: 139 MMOL/L (ref 135–145)
SP GR UR STRIP.AUTO: 1.01
UROBILINOGEN UR STRIP.AUTO-MCNC: 1 EU/DL
WBC # BLD AUTO: 7.4 K/UL (ref 4.8–10.8)
WBC #/AREA URNS HPF: ABNORMAL /HPF

## 2025-07-02 PROCEDURE — 96374 THER/PROPH/DIAG INJ IV PUSH: CPT

## 2025-07-02 PROCEDURE — 700105 HCHG RX REV CODE 258: Performed by: STUDENT IN AN ORGANIZED HEALTH CARE EDUCATION/TRAINING PROGRAM

## 2025-07-02 PROCEDURE — 94760 N-INVAS EAR/PLS OXIMETRY 1: CPT

## 2025-07-02 PROCEDURE — 700101 HCHG RX REV CODE 250: Performed by: STUDENT IN AN ORGANIZED HEALTH CARE EDUCATION/TRAINING PROGRAM

## 2025-07-02 PROCEDURE — 85025 COMPLETE CBC W/AUTO DIFF WBC: CPT

## 2025-07-02 PROCEDURE — 700102 HCHG RX REV CODE 250 W/ 637 OVERRIDE(OP): Performed by: STUDENT IN AN ORGANIZED HEALTH CARE EDUCATION/TRAINING PROGRAM

## 2025-07-02 PROCEDURE — 99285 EMERGENCY DEPT VISIT HI MDM: CPT

## 2025-07-02 PROCEDURE — 83690 ASSAY OF LIPASE: CPT

## 2025-07-02 PROCEDURE — 99213 OFFICE O/P EST LOW 20 MIN: CPT | Performed by: STUDENT IN AN ORGANIZED HEALTH CARE EDUCATION/TRAINING PROGRAM

## 2025-07-02 PROCEDURE — 81001 URINALYSIS AUTO W/SCOPE: CPT

## 2025-07-02 PROCEDURE — 3075F SYST BP GE 130 - 139MM HG: CPT | Performed by: STUDENT IN AN ORGANIZED HEALTH CARE EDUCATION/TRAINING PROGRAM

## 2025-07-02 PROCEDURE — 700111 HCHG RX REV CODE 636 W/ 250 OVERRIDE (IP): Mod: JZ | Performed by: STUDENT IN AN ORGANIZED HEALTH CARE EDUCATION/TRAINING PROGRAM

## 2025-07-02 PROCEDURE — 80053 COMPREHEN METABOLIC PANEL: CPT

## 2025-07-02 PROCEDURE — 3079F DIAST BP 80-89 MM HG: CPT | Performed by: STUDENT IN AN ORGANIZED HEALTH CARE EDUCATION/TRAINING PROGRAM

## 2025-07-02 PROCEDURE — A9270 NON-COVERED ITEM OR SERVICE: HCPCS | Performed by: STUDENT IN AN ORGANIZED HEALTH CARE EDUCATION/TRAINING PROGRAM

## 2025-07-02 PROCEDURE — 36415 COLL VENOUS BLD VENIPUNCTURE: CPT

## 2025-07-02 RX ORDER — SODIUM CHLORIDE 9 MG/ML
1000 INJECTION, SOLUTION INTRAVENOUS ONCE
Status: COMPLETED | OUTPATIENT
Start: 2025-07-02 | End: 2025-07-03

## 2025-07-02 RX ORDER — ONDANSETRON 2 MG/ML
4 INJECTION INTRAMUSCULAR; INTRAVENOUS ONCE
Status: COMPLETED | OUTPATIENT
Start: 2025-07-02 | End: 2025-07-02

## 2025-07-02 RX ORDER — ACETAMINOPHEN 500 MG
1000 TABLET ORAL ONCE
Status: COMPLETED | OUTPATIENT
Start: 2025-07-02 | End: 2025-07-02

## 2025-07-02 RX ADMIN — SODIUM CHLORIDE 1000 ML: 9 INJECTION, SOLUTION INTRAVENOUS at 23:15

## 2025-07-02 RX ADMIN — ONDANSETRON 4 MG: 2 INJECTION INTRAMUSCULAR; INTRAVENOUS at 22:32

## 2025-07-02 RX ADMIN — ACETAMINOPHEN 1000 MG: 500 TABLET ORAL at 22:34

## 2025-07-02 RX ADMIN — HYOSCYAMINE SULFATE 30 ML: 0.12 ELIXIR ORAL at 22:34

## 2025-07-02 ASSESSMENT — FIBROSIS 4 INDEX
FIB4 SCORE: 0.79
FIB4 SCORE: 0.79

## 2025-07-03 VITALS
DIASTOLIC BLOOD PRESSURE: 81 MMHG | WEIGHT: 173.06 LBS | SYSTOLIC BLOOD PRESSURE: 173 MMHG | HEART RATE: 63 BPM | BODY MASS INDEX: 33.98 KG/M2 | OXYGEN SATURATION: 92 % | RESPIRATION RATE: 16 BRPM | HEIGHT: 60 IN | TEMPERATURE: 97.7 F

## 2025-07-03 RX ORDER — DICYCLOMINE HCL 20 MG
20 TABLET ORAL EVERY 6 HOURS PRN
Qty: 60 TABLET | Refills: 0 | Status: SHIPPED | OUTPATIENT
Start: 2025-07-03

## 2025-07-03 RX ORDER — ONDANSETRON 4 MG/1
4 TABLET, ORALLY DISINTEGRATING ORAL EVERY 6 HOURS PRN
Qty: 10 TABLET | Refills: 0 | Status: SHIPPED | OUTPATIENT
Start: 2025-07-03

## 2025-07-03 NOTE — ED TRIAGE NOTES
Chief Complaint   Patient presents with    Diarrhea     20 hours of diarrhea. Pt denies traveling to a foreign country. Pt is having lower abdominal pain and it is only crampy feeling before she has to have diarrhea. Pt is unsure what caused this. Pt did not try any other the counter medication.     Abdominal Pain     BP (!) 177/93   Pulse 73   Temp 36.5 °C (97.7 °F) (Temporal)   Resp 16   Ht 1.524 m (5')   Wt 78.5 kg (173 lb 1 oz)   SpO2 97%   BMI 33.80 kg/m²

## 2025-07-03 NOTE — PROGRESS NOTES
Subjective:   CHIEF COMPLAINT  Chief Complaint   Patient presents with    Diarrhea     The last 20 hours, Headache .        HPI    History of Present Illness  Lauren Mandel is a 65 y.o. female who presents for evaluation of diarrhea.    She started having diarrhea on Monday, which was not severe yesterday, alternating between solid and loose stools. However, last night, she had only diarrhea and has had approximately 20 bowel movements today. She reports no blood in her stool but does have lower abdominal pain. She has not vomited but did feel nauseous after eating bread today.  Has had a diminished appetite.  Last night, she noticed a change in taste and felt discomfort after eating. She is unsure if she has had a fever in the past few days.    Patient also reports she began experiencing back pain 2 weeks ago, which has since subsided. However, she developed a cough that is not constant but seems to be triggered by eating or drinking, accompanied by a sensation of dripping in the back of her throat. She suspects this may be due to allergies. Last Thursday, she experienced severe heartburn, a symptom she does not typically experience.  She has been feeling unwell overall, with body aches and difficulty walking. This morning, she was unable to wake up for work and slept until 11:30 AM. She has not taken any antibiotics recently, traveled outside the country, or consumed unfiltered water. She has been around children who attend summer camp, but none of them were ill.      She has a history of diverticulosis, as diagnosed from a colonoscopy.    PAST SURGICAL HISTORY:  Hysterectomy        REVIEW OF SYSTEMS  General: no fever or chills  GI: no nausea or vomiting  See HPI for further details.    PAST MEDICAL HISTORY  Patient Active Problem List    Diagnosis Date Noted    Vulvovaginal dryness 09/26/2024    Pre-diabetes 01/23/2024    Grief 02/09/2023    BMI 31.0-31.9,adult 10/20/2022    Current mild episode of major  depressive disorder without prior episode (HCC) 08/25/2022    Hives 08/04/2022    Fibromyalgia 07/14/2022    Pain in unspecified joint 07/14/2022    HTN (hypertension) 07/14/2022    Hearing loss 07/14/2022    Diverticulosis 07/14/2022    Acquired absence of both cervix and uterus 07/14/2022    Long COVID 02/23/2022    Preventative health care 02/23/2022    Recurrent sinusitis 02/23/2022    Acute frontal sinusitis 08/27/2021    Rib pain 02/10/2021    Hip pain, right 02/10/2021    Nail abnormality 02/25/2020    Breast pain, right 02/25/2020    Muscle atrophy 06/04/2019    Stress incontinence 02/15/2019    Frequent headaches 11/05/2018    Shortness of breath 11/05/2018    Fatigue 11/05/2018    Vitamin D deficiency 08/03/2017    High alkaline phosphatase 08/03/2017    Plantar fasciitis, bilateral 07/07/2017    Arthralgia of ankle 07/07/2017    Vaginal atrophy 07/05/2017    Overweight with body mass index (BMI) 25.0-29.9 07/05/2017    Atopic dermatitis 09/29/2015    Weight gain 11/13/2014    Eustachian tube dysfunction, bilateral 11/13/2014    Depression 11/13/2014    Sinus congestion 04/10/2014       SURGICAL HISTORY   has a past surgical history that includes hysterectomy laparoscopy.    ALLERGIES  Allergies[1]    CURRENT MEDICATIONS  Home Medications       Reviewed by Daljit Rico D.O. (Physician) on 07/02/25 at 1844  Med List Status: <None>     Medication Last Dose Status   Acetaminophen (TYLENOL) 325 MG Cap Taking Active   benzonatate (TESSALON) 100 MG Cap Taking Active   Blood Glucose Meter Kit Taking Active   EPINEPHrine (EPIPEN) 0.3 MG/0.3ML Solution Auto-injector solution for injection Taking Active   estradiol (ESTRACE) 0.1 MG/GM vaginal cream Taking Active   hydrOXYzine HCl (ATARAX) 25 MG Tab Taking Active   ibuprofen (MOTRIN) 800 MG Tab Taking Active   metFORMIN ER (GLUCOPHAGE XR) 500 MG TABLET SR 24 HR Taking Active   sertraline (ZOLOFT) 50 MG Tab Taking Active   XIIDRA 5 % Solution Taking Active                     SOCIAL HISTORY  Social History     Tobacco Use    Smoking status: Never    Smokeless tobacco: Never   Vaping Use    Vaping status: Never Used   Substance and Sexual Activity    Alcohol use: No    Drug use: No    Sexual activity: Not on file       FAMILY HISTORY  History reviewed. No pertinent family history.       Objective:   PHYSICAL EXAM  VITAL SIGNS: /80 (BP Location: Left arm, Patient Position: Sitting, BP Cuff Size: Adult)   Pulse 74   Temp 36.7 °C (98 °F) (Temporal)   Resp 18   Ht 1.524 m (5')   Wt 78 kg (172 lb)   SpO2 100%   BMI 33.59 kg/m²     Gen: no acute distress, normal voice  Skin: dry, intact, moist mucosal membranes  Eyes: No conjunctival injection bilaterally.  Neck: Normal range of motion. No meningeal signs.   Lungs: No increased work of breathing.  CTAB w/ symmetric expansion  CV: RRR w/o murmurs or clicks  Abdomen: Bowel sounds normal, soft.  Slightly distended.  Global tenderness to palpation.  No involuntary guarding or rebound tenderness.  Psych: normal affect, normal judgement, alert, awake    Assessment/Plan:     1. Generalized abdominal pain        2. Diarrhea, unspecified type        Patient with double-digit bouts of diarrhea and global abdominal pain.  Does have a history of diverticulosis but no history of diverticulitis.  Colonoscopies are up-to-date.  Somewhat of a broad differential and unable to workup at this time in the evening therefore sent to ED for further evaluation.  Patient stated she will drive herself.  Verbalized her understanding all questions were answered.      Transfer center was contacted.        Please note that this dictation was created using voice recognition software. I have made a reasonable attempt to correct obvious errors, but I expect that there are errors of grammar and possibly content that I did not discover before finalizing the note.                [1]   Allergies  Allergen Reactions    Lac Bovis Unspecified    Pcn  [Penicillins]     Sulfa Drugs

## 2025-07-03 NOTE — ED PROVIDER NOTES
"ED Provider Note    CHIEF COMPLAINT  Chief Complaint   Patient presents with    Diarrhea     20 hours of diarrhea. Pt denies traveling to a foreign country. Pt is having lower abdominal pain and it is only crampy feeling before she has to have diarrhea. Pt is unsure what caused this. Pt did not try any other the counter medication.     Abdominal Pain       EXTERNAL RECORDS REVIEWED  Outpatient Notes sent for evaluation, persistent diarrhea    HPI/ROS  LIMITATION TO HISTORY   Select: : None      Lauren Mandel is a 65 y.o. female who presents Seen in urgent care earlier today, for diarrhea and headache, presenting with epigastric discomfort since Thursday, \"heartburn\" she took antacids, no improvement, had constipation, occasional loose stools, subsequently developed profuse diarrhea starting Monday.  Nausea no vomiting.  No fevers or chills.  Reports generalized abdominal pain, worse in the lower abdomen currently.  History of cholecystectomy, history of hysterectomy.  Generally healthy.  Does have a history of diarrhea once previously in January, and extensive workup at that time prescribed metformin however reports she does not currently take metformin no recent travel.  No recent antibiotics.  No recent sick contacts, no camping, no blood, no history of PPI use recently outside of the 1 time.    PAST MEDICAL HISTORY   has a past medical history of Grief (2/9/2023).    SURGICAL HISTORY   has a past surgical history that includes hysterectomy laparoscopy.    FAMILY HISTORY  No family history on file.    SOCIAL HISTORY  Social History     Tobacco Use    Smoking status: Never    Smokeless tobacco: Never   Vaping Use    Vaping status: Never Used   Substance and Sexual Activity    Alcohol use: No    Drug use: No    Sexual activity: Not on file       CURRENT MEDICATIONS  Home Medications       Reviewed by Darren Garrido R.N. (Registered Nurse) on 07/02/25 at 1943  Med List Status: Partial     Medication Last Dose " Status   Acetaminophen (TYLENOL) 325 MG Cap  Active   benzonatate (TESSALON) 100 MG Cap  Active   Blood Glucose Meter Kit  Active   EPINEPHrine (EPIPEN) 0.3 MG/0.3ML Solution Auto-injector solution for injection  Active   estradiol (ESTRACE) 0.1 MG/GM vaginal cream  Active   hydrOXYzine HCl (ATARAX) 25 MG Tab  Active   ibuprofen (MOTRIN) 800 MG Tab  Active   metFORMIN ER (GLUCOPHAGE XR) 500 MG TABLET SR 24 HR  Active   sertraline (ZOLOFT) 50 MG Tab  Active   XIIDRA 5 % Solution  Active                    ALLERGIES  Allergies[1]    PHYSICAL EXAM  VITAL SIGNS: BP (!) 173/81   Pulse 63   Temp 36.5 °C (97.7 °F) (Temporal)   Resp 16   Ht 1.524 m (5')   Wt 78.5 kg (173 lb 1 oz)   SpO2 92%   BMI 33.80 kg/m²    General: Very pleasant female, nontoxic-appearing, alert.  Resting in [facility]. Head: Normocephalic atraumatic  HENT: Extraocular motion intact  Neck: Supple, no rigidity  Cardiovascular: Regular rate and rhythm no murmurs rubs or gallops  Respiratory: Clear to auscultation bilaterally, equal chest rise and fall, no increased work of breathing  Abdomen: Soft  Diffuse lower abdominal tenderness, no focal tenderness in the lumbar left lower quadrant of the right lower quadrant, no localized peritonitis.  Musculoskeletal: Warm and well perfused, no peripheral edema  Neuro: Alert, no focal deficits    EKG/LABS  Labs Reviewed   COMP METABOLIC PANEL - Abnormal; Notable for the following components:       Result Value    Alkaline Phosphatase 120 (*)     Globulin 3.6 (*)     All other components within normal limits   URINALYSIS - Abnormal; Notable for the following components:    Occult Blood Trace (*)     All other components within normal limits   URINE MICROSCOPIC (W/UA) - Abnormal; Notable for the following components:    RBC 3-5 (*)     All other components within normal limits   CBC WITH DIFFERENTIAL   ESTIMATED GFR   LIPASE   CULTURE STOOL     Labs show no leukocytosis, no anion gap, no base deficit,  urinalysis shows 3-5 RBCs, no WBCs, lipase within normal limits.        COURSE & MEDICAL DECISION MAKING    ASSESSMENT, COURSE AND PLAN  Care Narrative: Very pleasant female, presenting with lower abdominal cramping, followed by loose stools, intermittent constipation, she did notably have a similar presentation in January 2024, had stool studies ultimately no diagnosis was obtained.      Vital signs here are reassuring, no tachycardia no fever no tachypnea.  On exam, patient has mild lower abdominal tenderness, no focal tenderness no guarding.    [place] includes elevated to: Dehydration, gastroenteritis, I considered diverticulitis, low suspicion overall, I am extremely low suspicion for diverticular complication such as microperforation or abscess given exam, vitals, labs.  No pain out of proportion to suggest mesenteric ischemia.  Patient has a history of cholecystectomy.  No focal right lower quadrant tenderness to suggest appendicitis.    Patient was kept here for some time, for symptom control, IV fluids, and attempting to obtain a stool culture.  She is unable to stool here, I discussed clear liquid diet, gradual progression of bland diet, symptomatic management, I did offer imaging via shared decision making, although ultimately we did discuss that it is suspected to be nonsurgical in nature, and likely improved with conservative management.    She is agreeable to return or follow-up with PCP immediately if symptoms are worsening and not improving.  Stool culture was ordered, she is aware that this result is pending that it needs to be followed up she states her primary care doctor, she is followed by [facility] family medicine, can review her results and is easy to follow-up with.  Reviewed patient's test results at the bedside, patient is agreeable to discharge, we discussed strict return precautions, and outpatient follow-up, patient was discharged in no acute distress.  All questions were answered prior to  discharge.      Hydration: Based on the patient's presentation of Acute Diarrhea the patient was given IV fluids. IV Hydration was used because oral hydration was not adequate alone. Upon recheck following hydration, the patient was improving.        DISPOSITION AND DISCUSSIONS    Discussion of management with other HP or appropriate source(s): None     Escalation of care considered, and ultimately not performed:diagnostic imaging and acute inpatient care management, however at this time, the patient is most appropriate for outpatient management      Decision tools and prescription drugs considered including, but not limited to: Antibiotics considered at this time will obtain stool culture, even if diverticulitis, doubt complicated diverticulitis and per recent gastroenterology, surgical guidelines, conservative management is reasonable initially, with bowel rest and slow progression of diet..    FINAL DIAGNOSIS  1. Diarrhea, unspecified type    2. Bilateral lower abdominal cramping         Electronically signed by: Daljit Alvarez M.D., 7/2/2025 9:51 PM           [1]   Allergies  Allergen Reactions    Lac Bovis Unspecified    Pcn [Penicillins]     S[facility]a Drugs

## 2025-07-03 NOTE — DISCHARGE INSTRUCTIONS
Drink only fluids for the next 2 days, subsequently, you can slowly progress your diet to include purées, and then eventually solid food, maintaining a bland diet.        Drink lots of fluids, to stay hydrated, drink electrolyte containing fluids such as Pedialyte, liquid IV.  You take the nausea medications as needed.  You are continuing Bentyl for cramping, your stool culture was ordered to the lab, as discussed, please discuss this with your primary care doctor.  Return for fever, severe worsening pain, other new concerning symptoms as discussed.

## 2025-08-01 ENCOUNTER — APPOINTMENT (OUTPATIENT)
Dept: MEDICAL GROUP | Facility: CLINIC | Age: 65
End: 2025-08-01
Payer: COMMERCIAL

## 2025-08-14 ENCOUNTER — HOSPITAL ENCOUNTER (OUTPATIENT)
Facility: MEDICAL CENTER | Age: 65
End: 2025-08-14
Payer: COMMERCIAL

## 2025-08-14 ENCOUNTER — OFFICE VISIT (OUTPATIENT)
Dept: MEDICAL GROUP | Facility: CLINIC | Age: 65
End: 2025-08-14
Payer: COMMERCIAL

## 2025-08-14 VITALS
BODY MASS INDEX: 34.41 KG/M2 | SYSTOLIC BLOOD PRESSURE: 110 MMHG | OXYGEN SATURATION: 94 % | HEIGHT: 60 IN | HEART RATE: 71 BPM | DIASTOLIC BLOOD PRESSURE: 68 MMHG | WEIGHT: 175.27 LBS | TEMPERATURE: 97.4 F

## 2025-08-14 DIAGNOSIS — R74.8 HIGH ALKALINE PHOSPHATASE: Primary | ICD-10-CM

## 2025-08-14 DIAGNOSIS — R19.5 PALE STOOL: ICD-10-CM

## 2025-08-14 DIAGNOSIS — R74.8 HIGH ALKALINE PHOSPHATASE: ICD-10-CM

## 2025-08-14 DIAGNOSIS — R31.9 HEMATURIA, UNSPECIFIED TYPE: ICD-10-CM

## 2025-08-14 DIAGNOSIS — Z12.31 ENCOUNTER FOR SCREENING MAMMOGRAM FOR MALIGNANT NEOPLASM OF BREAST: ICD-10-CM

## 2025-08-14 DIAGNOSIS — N95.1 VAGINAL DRYNESS, MENOPAUSAL: ICD-10-CM

## 2025-08-14 PROBLEM — R06.02 SHORTNESS OF BREATH: Status: RESOLVED | Noted: 2018-11-05 | Resolved: 2025-08-14

## 2025-08-14 PROBLEM — N64.4 BREAST PAIN, RIGHT: Status: RESOLVED | Noted: 2020-02-25 | Resolved: 2025-08-14

## 2025-08-14 PROBLEM — Z00.00 PREVENTATIVE HEALTH CARE: Status: RESOLVED | Noted: 2022-02-23 | Resolved: 2025-08-14

## 2025-08-14 PROBLEM — R07.81 RIB PAIN: Status: RESOLVED | Noted: 2021-02-10 | Resolved: 2025-08-14

## 2025-08-14 PROBLEM — M25.579 ARTHRALGIA OF ANKLE: Status: RESOLVED | Noted: 2017-07-07 | Resolved: 2025-08-14

## 2025-08-14 LAB
APPEARANCE UR: ABNORMAL
BACTERIA #/AREA URNS HPF: ABNORMAL /HPF
BILIRUB UR QL STRIP.AUTO: NEGATIVE
CASTS URNS QL MICRO: ABNORMAL /LPF (ref 0–2)
COLOR UR: YELLOW
EPITHELIAL CELLS 1715: ABNORMAL /HPF (ref 0–5)
GGT SERPL-CCNC: 21 U/L (ref 7–34)
GLUCOSE UR STRIP.AUTO-MCNC: NEGATIVE MG/DL
KETONES UR STRIP.AUTO-MCNC: NEGATIVE MG/DL
LEUKOCYTE ESTERASE UR QL STRIP.AUTO: ABNORMAL
MICRO URNS: ABNORMAL
MUCOUS THREADS URNS QL MICRO: PRESENT /HPF
NITRITE UR QL STRIP.AUTO: NEGATIVE
PH UR STRIP.AUTO: 6 [PH] (ref 5–8)
PROT UR QL STRIP: NEGATIVE MG/DL
RBC # URNS HPF: ABNORMAL /HPF (ref 0–2)
RBC UR QL AUTO: ABNORMAL
SP GR UR STRIP.AUTO: 1.02
UROBILINOGEN UR STRIP.AUTO-MCNC: 0.2 EU/DL
WBC #/AREA URNS HPF: ABNORMAL /HPF

## 2025-08-14 PROCEDURE — 83915 ASSAY OF NUCLEOTIDASE: CPT

## 2025-08-14 PROCEDURE — 3074F SYST BP LT 130 MM HG: CPT | Mod: GC

## 2025-08-14 PROCEDURE — 36415 COLL VENOUS BLD VENIPUNCTURE: CPT

## 2025-08-14 PROCEDURE — 81001 URINALYSIS AUTO W/SCOPE: CPT

## 2025-08-14 PROCEDURE — 3078F DIAST BP <80 MM HG: CPT | Mod: GC

## 2025-08-14 PROCEDURE — 82977 ASSAY OF GGT: CPT

## 2025-08-14 PROCEDURE — 99214 OFFICE O/P EST MOD 30 MIN: CPT | Mod: GC

## 2025-08-14 RX ORDER — ESTRADIOL 0.1 MG/G
1 CREAM VAGINAL DAILY
Qty: 42.5 G | Refills: 1 | Status: SHIPPED | OUTPATIENT
Start: 2025-08-14

## 2025-08-14 ASSESSMENT — PATIENT HEALTH QUESTIONNAIRE - PHQ9
4. FEELING TIRED OR HAVING LITTLE ENERGY: NEARLY EVERY DAY
6. FEELING BAD ABOUT YOURSELF - OR THAT YOU ARE A FAILURE OR HAVE LET YOURSELF OR YOUR FAMILY DOWN: NOT AL ALL
5. POOR APPETITE OR OVEREATING: SEVERAL DAYS
9. THOUGHTS THAT YOU WOULD BE BETTER OFF DEAD, OR OF HURTING YOURSELF: NOT AT ALL
2. FEELING DOWN, DEPRESSED, IRRITABLE, OR HOPELESS: NOT AT ALL
7. TROUBLE CONCENTRATING ON THINGS, SUCH AS READING THE NEWSPAPER OR WATCHING TELEVISION: NEARLY EVERY DAY
SUM OF ALL RESPONSES TO PHQ QUESTIONS 1-9: 9
8. MOVING OR SPEAKING SO SLOWLY THAT OTHER PEOPLE COULD HAVE NOTICED. OR THE OPPOSITE, BEING SO FIGETY OR RESTLESS THAT YOU HAVE BEEN MOVING AROUND A LOT MORE THAN USUAL: NOT AT ALL
3. TROUBLE FALLING OR STAYING ASLEEP OR SLEEPING TOO MUCH: SEVERAL DAYS
1. LITTLE INTEREST OR PLEASURE IN DOING THINGS: SEVERAL DAYS
SUM OF ALL RESPONSES TO PHQ9 QUESTIONS 1 AND 2: 1

## 2025-08-14 ASSESSMENT — FIBROSIS 4 INDEX: FIB4 SCORE: 1.17

## 2025-08-17 LAB — 5NT SERPL-CCNC: 12 U/L (ref 0–15)

## 2025-08-18 ENCOUNTER — APPOINTMENT (OUTPATIENT)
Dept: RADIOLOGY | Facility: MEDICAL CENTER | Age: 65
End: 2025-08-18
Payer: COMMERCIAL

## 2025-08-18 VITALS — HEIGHT: 60 IN | WEIGHT: 173 LBS | BODY MASS INDEX: 33.96 KG/M2

## 2025-08-18 DIAGNOSIS — Z12.31 VISIT FOR SCREENING MAMMOGRAM: ICD-10-CM

## 2025-08-18 PROCEDURE — 77067 SCR MAMMO BI INCL CAD: CPT

## 2025-08-18 ASSESSMENT — FIBROSIS 4 INDEX: FIB4 SCORE: 1.17

## 2025-08-20 DIAGNOSIS — R74.8 HIGH ALKALINE PHOSPHATASE: Primary | ICD-10-CM

## 2025-08-25 ENCOUNTER — HOSPITAL ENCOUNTER (OUTPATIENT)
Dept: LAB | Facility: MEDICAL CENTER | Age: 65
End: 2025-08-25
Payer: COMMERCIAL

## 2025-08-25 DIAGNOSIS — R74.8 HIGH ALKALINE PHOSPHATASE: ICD-10-CM

## 2025-08-26 ENCOUNTER — HOSPITAL ENCOUNTER (OUTPATIENT)
Dept: LAB | Facility: MEDICAL CENTER | Age: 65
End: 2025-08-26
Payer: COMMERCIAL

## 2025-08-26 LAB
ALBUMIN SERPL BCP-MCNC: 4.1 G/DL (ref 3.2–4.9)
CALCIUM SERPL-MCNC: 9.1 MG/DL (ref 8.5–10.5)
CREAT SERPL-MCNC: 0.62 MG/DL (ref 0.5–1.4)
GFR SERPLBLD CREATININE-BSD FMLA CKD-EPI: 97 ML/MIN/1.73 M 2
PTH-INTACT SERPL-MCNC: 49.5 PG/ML (ref 14–72)
T4 FREE SERPL-MCNC: 1 NG/DL (ref 0.93–1.7)
TSH SERPL-ACNC: 1.06 UIU/ML (ref 0.38–5.33)

## 2025-08-26 PROCEDURE — 82565 ASSAY OF CREATININE: CPT

## 2025-08-26 PROCEDURE — 84439 ASSAY OF FREE THYROXINE: CPT

## 2025-08-26 PROCEDURE — 83521 IG LIGHT CHAINS FREE EACH: CPT

## 2025-08-26 PROCEDURE — 82784 ASSAY IGA/IGD/IGG/IGM EACH: CPT

## 2025-08-26 PROCEDURE — 84155 ASSAY OF PROTEIN SERUM: CPT

## 2025-08-26 PROCEDURE — 86334 IMMUNOFIX E-PHORESIS SERUM: CPT

## 2025-08-26 PROCEDURE — 82310 ASSAY OF CALCIUM: CPT

## 2025-08-26 PROCEDURE — 36415 COLL VENOUS BLD VENIPUNCTURE: CPT

## 2025-08-26 PROCEDURE — 84443 ASSAY THYROID STIM HORMONE: CPT

## 2025-08-26 PROCEDURE — 82040 ASSAY OF SERUM ALBUMIN: CPT

## 2025-08-26 PROCEDURE — 84165 PROTEIN E-PHORESIS SERUM: CPT

## 2025-08-26 PROCEDURE — 83970 ASSAY OF PARATHORMONE: CPT

## 2025-08-29 LAB
ALBUMIN SERPL ELPH-MCNC: 4.04 G/DL (ref 3.75–5.01)
ALPHA1 GLOB SERPL ELPH-MCNC: 0.25 G/DL (ref 0.19–0.46)
ALPHA2 GLOB SERPL ELPH-MCNC: 0.61 G/DL (ref 0.48–1.05)
B-GLOBULIN SERPL ELPH-MCNC: 0.91 G/DL (ref 0.48–1.1)
EER MONOCLONAL PROTEIN AND FLC, SERUM Q5224: ABNORMAL
GAMMA GLOB SERPL ELPH-MCNC: 1.5 G/DL (ref 0.62–1.51)
IGA SERPL-MCNC: 216 MG/DL (ref 68–408)
IGG SERPL-MCNC: 1414 MG/DL (ref 768–1632)
IGM SERPL-MCNC: 167 MG/DL (ref 35–263)
INTERPRETATION SERPL IFE-IMP: ABNORMAL
INTERPRETATION SERPL IFE-IMP: ABNORMAL
KAPPA LC FREE SER-MCNC: 30.15 MG/L (ref 3.3–19.4)
KAPPA LC FREE/LAMBDA FREE SER NEPH: 1.56 {RATIO} (ref 0.26–1.65)
LAMBDA LC FREE SERPL-MCNC: 19.27 MG/L (ref 5.71–26.3)
MONOCLONAL PROTEIN NL11656: ABNORMAL G/DL
PROT SERPL-MCNC: 7.3 G/DL (ref 6.3–8.2)

## 2025-09-26 ENCOUNTER — APPOINTMENT (OUTPATIENT)
Dept: MEDICAL GROUP | Facility: CLINIC | Age: 65
End: 2025-09-26
Payer: COMMERCIAL